# Patient Record
Sex: MALE | Race: WHITE | NOT HISPANIC OR LATINO | Employment: OTHER | URBAN - METROPOLITAN AREA
[De-identification: names, ages, dates, MRNs, and addresses within clinical notes are randomized per-mention and may not be internally consistent; named-entity substitution may affect disease eponyms.]

---

## 2017-04-28 ENCOUNTER — TRANSCRIBE ORDERS (OUTPATIENT)
Dept: LAB | Facility: HOSPITAL | Age: 82
End: 2017-04-28

## 2017-04-28 ENCOUNTER — LAB (OUTPATIENT)
Dept: LAB | Facility: HOSPITAL | Age: 82
End: 2017-04-28
Attending: INTERNAL MEDICINE

## 2017-04-28 DIAGNOSIS — E87.5 HYPERPOTASSEMIA: ICD-10-CM

## 2017-04-28 DIAGNOSIS — N18.30 CHRONIC KIDNEY DISEASE, STAGE III (MODERATE) (HCC): ICD-10-CM

## 2017-04-28 DIAGNOSIS — E87.5 HYPERPOTASSEMIA: Primary | ICD-10-CM

## 2017-04-28 LAB
ANION GAP SERPL CALCULATED.3IONS-SCNC: 12 MMOL/L
BACTERIA UR QL AUTO: NORMAL /HPF
BILIRUB UR QL STRIP: NEGATIVE
BUN BLD-MCNC: 23 MG/DL (ref 8–23)
BUN/CREAT SERPL: 14.7 (ref 7–25)
CALCIUM SPEC-SCNC: 9.5 MG/DL (ref 8.6–10.5)
CHLORIDE SERPL-SCNC: 102 MMOL/L (ref 98–107)
CLARITY UR: CLEAR
CO2 SERPL-SCNC: 25 MMOL/L (ref 22–29)
COLOR UR: YELLOW
CREAT BLD-MCNC: 1.56 MG/DL (ref 0.76–1.27)
GFR SERPL CREATININE-BSD FRML MDRD: 43 ML/MIN/1.73
GLUCOSE BLD-MCNC: 99 MG/DL (ref 65–99)
GLUCOSE UR STRIP-MCNC: NEGATIVE MG/DL
HGB UR QL STRIP.AUTO: NEGATIVE
HYALINE CASTS UR QL AUTO: NORMAL /LPF
KETONES UR QL STRIP: NEGATIVE
LEUKOCYTE ESTERASE UR QL STRIP.AUTO: NEGATIVE
NITRITE UR QL STRIP: NEGATIVE
PH UR STRIP.AUTO: 5.5 [PH] (ref 5–8)
POTASSIUM BLD-SCNC: 5.5 MMOL/L (ref 3.5–5.2)
PROT UR QL STRIP: ABNORMAL
RBC # UR: NORMAL /HPF
REF LAB TEST METHOD: NORMAL
SODIUM BLD-SCNC: 139 MMOL/L (ref 136–145)
SP GR UR STRIP: 1.02 (ref 1–1.03)
SQUAMOUS #/AREA URNS HPF: NORMAL /HPF
UROBILINOGEN UR QL STRIP: ABNORMAL
WBC UR QL AUTO: NORMAL /HPF

## 2017-04-28 PROCEDURE — 36415 COLL VENOUS BLD VENIPUNCTURE: CPT

## 2017-04-28 PROCEDURE — 81001 URINALYSIS AUTO W/SCOPE: CPT

## 2017-04-28 PROCEDURE — 80048 BASIC METABOLIC PNL TOTAL CA: CPT

## 2017-06-05 RX ORDER — PANTOPRAZOLE SODIUM 40 MG/1
TABLET, DELAYED RELEASE ORAL
Qty: 30 TABLET | Refills: 5 | Status: SHIPPED | OUTPATIENT
Start: 2017-06-05 | End: 2017-10-03 | Stop reason: SDUPTHER

## 2017-10-03 ENCOUNTER — APPOINTMENT (OUTPATIENT)
Dept: GENERAL RADIOLOGY | Facility: HOSPITAL | Age: 82
End: 2017-10-03

## 2017-10-03 ENCOUNTER — HOSPITAL ENCOUNTER (INPATIENT)
Facility: HOSPITAL | Age: 82
LOS: 3 days | Discharge: HOME | End: 2017-10-06
Attending: EMERGENCY MEDICINE | Admitting: INTERNAL MEDICINE

## 2017-10-03 DIAGNOSIS — J93.11 PRIMARY SPONTANEOUS PNEUMOTHORAX: Primary | ICD-10-CM

## 2017-10-03 PROBLEM — J44.9 COPD (CHRONIC OBSTRUCTIVE PULMONARY DISEASE): Status: ACTIVE | Noted: 2017-10-03

## 2017-10-03 LAB
ALBUMIN SERPL-MCNC: 4.9 G/DL (ref 3.5–5.2)
ALBUMIN/GLOB SERPL: 1.2 G/DL
ALP SERPL-CCNC: 133 U/L (ref 39–117)
ALT SERPL W P-5'-P-CCNC: 10 U/L (ref 1–41)
ANION GAP SERPL CALCULATED.3IONS-SCNC: 15.8 MMOL/L
ARTERIAL PATENCY WRIST A: POSITIVE
AST SERPL-CCNC: 20 U/L (ref 1–40)
ATMOSPHERIC PRESS: 760.2 MMHG
BASE EXCESS BLDA CALC-SCNC: -2 MMOL/L (ref 0–2)
BASOPHILS # BLD AUTO: 0.02 10*3/MM3 (ref 0–0.2)
BASOPHILS NFR BLD AUTO: 0.2 % (ref 0–1.5)
BDY SITE: ABNORMAL
BILIRUB SERPL-MCNC: 0.5 MG/DL (ref 0.1–1.2)
BUN BLD-MCNC: 20 MG/DL (ref 8–23)
BUN/CREAT SERPL: 15.9 (ref 7–25)
CALCIUM SPEC-SCNC: 10.2 MG/DL (ref 8.6–10.5)
CHLORIDE SERPL-SCNC: 97 MMOL/L (ref 98–107)
CO2 SERPL-SCNC: 25.2 MMOL/L (ref 22–29)
CREAT BLD-MCNC: 1.26 MG/DL (ref 0.76–1.27)
D-LACTATE SERPL-SCNC: 1 MMOL/L (ref 0.5–2)
DEPRECATED RDW RBC AUTO: 48.5 FL (ref 37–54)
EOSINOPHIL # BLD AUTO: 0.2 10*3/MM3 (ref 0–0.7)
EOSINOPHIL NFR BLD AUTO: 1.7 % (ref 0.3–6.2)
ERYTHROCYTE [DISTWIDTH] IN BLOOD BY AUTOMATED COUNT: 13.7 % (ref 11.5–14.5)
GAS FLOW AIRWAY: 0.5 LPM
GFR SERPL CREATININE-BSD FRML MDRD: 55 ML/MIN/1.73
GLOBULIN UR ELPH-MCNC: 4 GM/DL
GLUCOSE BLD-MCNC: 97 MG/DL (ref 65–99)
HCO3 BLDA-SCNC: 24.5 MMOL/L (ref 22–28)
HCT VFR BLD AUTO: 41.6 % (ref 40.4–52.2)
HGB BLD-MCNC: 13.7 G/DL (ref 13.7–17.6)
IMM GRANULOCYTES # BLD: 0.03 10*3/MM3 (ref 0–0.03)
IMM GRANULOCYTES NFR BLD: 0.2 % (ref 0–0.5)
LYMPHOCYTES # BLD AUTO: 1.7 10*3/MM3 (ref 0.9–4.8)
LYMPHOCYTES NFR BLD AUTO: 14.1 % (ref 19.6–45.3)
MCH RBC QN AUTO: 32.1 PG (ref 27–32.7)
MCHC RBC AUTO-ENTMCNC: 32.9 G/DL (ref 32.6–36.4)
MCV RBC AUTO: 97.4 FL (ref 79.8–96.2)
MODALITY: ABNORMAL
MONOCYTES # BLD AUTO: 1.46 10*3/MM3 (ref 0.2–1.2)
MONOCYTES NFR BLD AUTO: 12.1 % (ref 5–12)
NEUTROPHILS # BLD AUTO: 8.63 10*3/MM3 (ref 1.9–8.1)
NEUTROPHILS NFR BLD AUTO: 71.7 % (ref 42.7–76)
NT-PROBNP SERPL-MCNC: 588 PG/ML (ref 0–1800)
PCO2 BLDA: 47.3 MM HG (ref 35–45)
PH BLDA: 7.32 PH UNITS (ref 7.35–7.45)
PLATELET # BLD AUTO: 219 10*3/MM3 (ref 140–500)
PMV BLD AUTO: 12.1 FL (ref 6–12)
PO2 BLDA: 81.8 MM HG (ref 80–100)
POTASSIUM BLD-SCNC: 5 MMOL/L (ref 3.5–5.2)
PROT SERPL-MCNC: 8.9 G/DL (ref 6–8.5)
RBC # BLD AUTO: 4.27 10*6/MM3 (ref 4.6–6)
SAO2 % BLDCOA: 94.9 % (ref 92–99)
SET MECH RESP RATE: 22
SODIUM BLD-SCNC: 138 MMOL/L (ref 136–145)
TROPONIN T SERPL-MCNC: <0.01 NG/ML (ref 0–0.03)
WBC NRBC COR # BLD: 12.04 10*3/MM3 (ref 4.5–10.7)

## 2017-10-03 PROCEDURE — 25010000002 MIDAZOLAM PER 1 MG

## 2017-10-03 PROCEDURE — 87040 BLOOD CULTURE FOR BACTERIA: CPT | Performed by: NURSE PRACTITIONER

## 2017-10-03 PROCEDURE — 94640 AIRWAY INHALATION TREATMENT: CPT

## 2017-10-03 PROCEDURE — 85025 COMPLETE CBC W/AUTO DIFF WBC: CPT | Performed by: NURSE PRACTITIONER

## 2017-10-03 PROCEDURE — 71020 HC CHEST PA AND LATERAL: CPT

## 2017-10-03 PROCEDURE — 94799 UNLISTED PULMONARY SVC/PX: CPT

## 2017-10-03 PROCEDURE — 82803 BLOOD GASES ANY COMBINATION: CPT

## 2017-10-03 PROCEDURE — 25010000002 METHYLPREDNISOLONE PER 125 MG: Performed by: NURSE PRACTITIONER

## 2017-10-03 PROCEDURE — 84484 ASSAY OF TROPONIN QUANT: CPT | Performed by: NURSE PRACTITIONER

## 2017-10-03 PROCEDURE — 80053 COMPREHEN METABOLIC PANEL: CPT | Performed by: NURSE PRACTITIONER

## 2017-10-03 PROCEDURE — 0W9B30Z DRAINAGE OF LEFT PLEURAL CAVITY WITH DRAINAGE DEVICE, PERCUTANEOUS APPROACH: ICD-10-PCS | Performed by: EMERGENCY MEDICINE

## 2017-10-03 PROCEDURE — 93010 ELECTROCARDIOGRAM REPORT: CPT | Performed by: INTERNAL MEDICINE

## 2017-10-03 PROCEDURE — 93005 ELECTROCARDIOGRAM TRACING: CPT

## 2017-10-03 PROCEDURE — 83880 ASSAY OF NATRIURETIC PEPTIDE: CPT | Performed by: NURSE PRACTITIONER

## 2017-10-03 PROCEDURE — 71010 HC CHEST PA OR AP: CPT

## 2017-10-03 PROCEDURE — 99285 EMERGENCY DEPT VISIT HI MDM: CPT

## 2017-10-03 PROCEDURE — 36600 WITHDRAWAL OF ARTERIAL BLOOD: CPT

## 2017-10-03 PROCEDURE — 83605 ASSAY OF LACTIC ACID: CPT | Performed by: NURSE PRACTITIONER

## 2017-10-03 RX ORDER — MIDAZOLAM HYDROCHLORIDE 1 MG/ML
2 INJECTION INTRAMUSCULAR; INTRAVENOUS ONCE
Status: COMPLETED | OUTPATIENT
Start: 2017-10-03 | End: 2017-10-03

## 2017-10-03 RX ORDER — SODIUM CHLORIDE 9 MG/ML
75 INJECTION, SOLUTION INTRAVENOUS CONTINUOUS
Status: DISCONTINUED | OUTPATIENT
Start: 2017-10-03 | End: 2017-10-04

## 2017-10-03 RX ORDER — MIDAZOLAM HYDROCHLORIDE 1 MG/ML
INJECTION INTRAMUSCULAR; INTRAVENOUS
Status: COMPLETED
Start: 2017-10-03 | End: 2017-10-03

## 2017-10-03 RX ORDER — SODIUM CHLORIDE 0.9 % (FLUSH) 0.9 %
1-10 SYRINGE (ML) INJECTION AS NEEDED
Status: DISCONTINUED | OUTPATIENT
Start: 2017-10-03 | End: 2017-10-06 | Stop reason: HOSPADM

## 2017-10-03 RX ORDER — PANTOPRAZOLE SODIUM 40 MG/1
40 TABLET, DELAYED RELEASE ORAL DAILY
COMMUNITY
End: 2017-12-11 | Stop reason: SDUPTHER

## 2017-10-03 RX ORDER — ALBUTEROL SULFATE 2.5 MG/3ML
2.5 SOLUTION RESPIRATORY (INHALATION)
Status: DISCONTINUED | OUTPATIENT
Start: 2017-10-03 | End: 2017-10-06 | Stop reason: HOSPADM

## 2017-10-03 RX ORDER — METHYLPREDNISOLONE SODIUM SUCCINATE 125 MG/2ML
125 INJECTION, POWDER, LYOPHILIZED, FOR SOLUTION INTRAMUSCULAR; INTRAVENOUS ONCE
Status: COMPLETED | OUTPATIENT
Start: 2017-10-03 | End: 2017-10-03

## 2017-10-03 RX ORDER — IPRATROPIUM BROMIDE AND ALBUTEROL SULFATE 2.5; .5 MG/3ML; MG/3ML
3 SOLUTION RESPIRATORY (INHALATION) ONCE
Status: COMPLETED | OUTPATIENT
Start: 2017-10-03 | End: 2017-10-03

## 2017-10-03 RX ORDER — ALBUTEROL SULFATE 2.5 MG/3ML
2.5 SOLUTION RESPIRATORY (INHALATION)
Status: DISPENSED | OUTPATIENT
Start: 2017-10-03 | End: 2017-10-03

## 2017-10-03 RX ORDER — IPRATROPIUM BROMIDE AND ALBUTEROL SULFATE 2.5; .5 MG/3ML; MG/3ML
3 SOLUTION RESPIRATORY (INHALATION)
Status: DISCONTINUED | OUTPATIENT
Start: 2017-10-03 | End: 2017-10-04

## 2017-10-03 RX ADMIN — IPRATROPIUM BROMIDE AND ALBUTEROL SULFATE 3 ML: .5; 3 SOLUTION RESPIRATORY (INHALATION) at 17:03

## 2017-10-03 RX ADMIN — ALBUTEROL SULFATE 2.5 MG: 2.5 SOLUTION RESPIRATORY (INHALATION) at 17:04

## 2017-10-03 RX ADMIN — MIDAZOLAM HYDROCHLORIDE 2 MG: 1 INJECTION, SOLUTION INTRAMUSCULAR; INTRAVENOUS at 18:14

## 2017-10-03 RX ADMIN — IPRATROPIUM BROMIDE AND ALBUTEROL SULFATE 3 ML: .5; 3 SOLUTION RESPIRATORY (INHALATION) at 23:41

## 2017-10-03 RX ADMIN — SODIUM CHLORIDE 75 ML/HR: 9 INJECTION, SOLUTION INTRAVENOUS at 20:20

## 2017-10-03 RX ADMIN — METHYLPREDNISOLONE SODIUM SUCCINATE 125 MG: 125 INJECTION, POWDER, FOR SOLUTION INTRAMUSCULAR; INTRAVENOUS at 17:12

## 2017-10-03 RX ADMIN — MIDAZOLAM HYDROCHLORIDE 2 MG: 1 INJECTION INTRAMUSCULAR; INTRAVENOUS at 18:14

## 2017-10-03 NOTE — H&P
"    Patient Identification:  Name: Louis Villalba  Age/Sex: 84 y.o. male  :  10/20/1932  MRN: [id number]         Primary Care Physician: Adam Feliz MD    Chief Complaint   Patient presents with   • Shortness of Breath     started last night has gotten worse today.       Subjective   Patient is a 84 y.o. male with a h/o asbestosis who comes in for worsening shortness of breath since last night. States he was going up and down stairs several times last night for work around his house. States he became sob after this and he was unable to sleep all night because every time he tried to lay down the sob would become much worse. Since it did not get any better he came in to the ED and was found to have a left-sided PTX. He also had changes of severe COPD on CXR and states he saw a pulmonary doctor many years ago but is on no inhalers and hasn't been back to see him for some time. He states he was exposed to asbestos previously but was told it was \"all calcified\" in his lung now.     History of Present Illness    Past Medical History:   Diagnosis Date   • Anemia    • Arthritis    • Cancer     skin   • Colon polyp    • History of transfusion    • Hyperlipidemia    • Macular degeneration      Past Surgical History:   Procedure Laterality Date   • APPENDECTOMY     • COLONOSCOPY     • ENDOSCOPY N/A 10/5/2016    Procedure: ESOPHAGOGASTRODUODENOSCOPY with biopsy;  Surgeon: Edward Gan MD;  [facility]: [facility] ENDOSCOPY;  Service:    • SKIN BIOPSY       History reviewed. No pertinent family history.  Social History   Substance Use Topics   • Smoking status: Former Smoker     Quit date:    • Smokeless tobacco: None   • Alcohol use Yes      Comment: daily       (Not in a hospital admission)  Allergies:  Polymyxin b-trimethoprim and Sulfa antibiotics    Review of Systems   Constitutional: Negative.    HENT: Negative.    Eyes: Negative.    Respiratory: Positive for shortness of breath and wheezing.  "   Cardiovascular: Positive for chest pain. Negative for palpitations.        Pleuritic CP   Gastrointestinal: Negative.    Endocrine: Negative.    Genitourinary: Negative.    Musculoskeletal: Negative.    Skin: Negative.    Neurological: Negative.    Hematological: Negative.    Psychiatric/Behavioral: Negative.         Objective    Vital Signs  Temp:  [97.3 °F (36.3 °C)] 97.3 °F (36.3 °C)  Heart Rate:  [] 91  Resp:  [14-26] 14  BP: (125-224)/() 125/72  Body mass index is 18.56 kg/(m^2).    Physical Exam   Constitutional: He is oriented to person, place, and time.   chachectic and chronically-ill appearing   HENT:   Head: Normocephalic and atraumatic.   Mouth/Throat: No oropharyngeal exudate.   Eyes: Conjunctivae are normal. No scleral icterus.   Neck: Normal range of motion. No JVD present. No tracheal deviation present.   Cardiovascular: Normal rate and regular rhythm.    No murmur heard.  Pulmonary/Chest: Effort normal.   Decreased breath sounds   Abdominal: Soft. Bowel sounds are normal. He exhibits no distension. There is no tenderness.   Musculoskeletal: Normal range of motion. He exhibits no edema.   Neurological: He is alert and oriented to person, place, and time.   Skin: Skin is warm and dry.   Psychiatric: He has a normal mood and affect. His behavior is normal. Judgment and thought content normal.       Results Review:   I reviewed the patient's new clinical results.  Imaging Results (last 24 hours)     Procedure Component Value Units Date/Time    XR Chest 2 View [[id number]] Collected:  10/03/17 1832     Updated:  10/03/17 1855    Narrative:       TWO-VIEW CHEST     HISTORY: Shortness of breath.     There is severe COPD with hyperinflation. There is a fairly large left  lateral pneumothorax measuring up to 30% and this finding was called  immediately to the emergency room at the time of the dictation. There is  some minimal mediastinal shift to the right related to the pneumothorax.     The  lungs are clear except for numerous bilateral calcified granulomas,  unchanged from 10/02/2016. The heart size is normal.     This report was finalized on 10/3/2017 6:52 PM by Dr. Jose Lee MD.       XR Chest 1 View [[id number]] Collected:  10/03/17 1921     Updated:  10/03/17 1921    Narrative:       ONE VIEW PORTABLE CHEST AT 1903 HOURS     HISTORY: Chest tube placement for pneumothorax.     FINDINGS: A left-sided chest tube has been inserted for a previous large  left-sided pneumothorax and there is a very small residual left apical  lateral pneumothorax measuring up to 8 mm in thickness. The lungs are  hyperinflated and clear except for a few scattered calcified granulomas.  The heart size is normal.             Assessment/Plan     Active Problems:    Primary spontaneous pneumothorax    COPD (chronic obstructive pulmonary disease)      Assessment & Plan  1. Spontaneous PTX  - chest tube in place  - Dr Gutiérrez has been consulted and will manage    2. COPD  - he needs to see a Pulmonologist for this.   - no wheezing heard on exam so will hold off any steroids. He did receive dose of solumedrol in ED  - will give duonebs and prn albuterol  - Pulm consult    3. Leukocytosis  - think this is likely reactive from #1 and not pneumonia  - will not start abx right now    4. CKD3  - Cr likely at baseline  - gentle IVF overnight      I discussed the patients findings and my recommendations with patient and family.          Gibson Huizar MD  [facility]  10/03/17  7:33 PM

## 2017-10-03 NOTE — ED PROVIDER NOTES
"Pt presents to the ED c/o shortness of air. On exam, decreased BS in the left lung. Discussed the imaging results showing a pneumothorax and the plan for treatment and admission.     1813  Imaging confirmed.     1841  Chest tube in. Pt tolerated well.     1905  Imaging confirmed tube in correct position.     1939  BP- 125/72 HR- 91 Temp- 97.3 °F (36.3 °C) O2 sat- 99%  Rechecked the patient who is in NAD and is resting comfortably. Discussed imaging showing proper tube placement.         Chest Tube Insertion  Date/Time: 10/3/2017 6:15 PM  Performed by: TU KOWALSKI  Authorized by: TU KOWALSKI   Consent: Verbal consent obtained.  Risks and benefits: risks, benefits and alternatives were discussed  Consent given by: patient  Relevant documents: relevant documents present and verified  Test results: test results available and properly labeled  Site marked: the operative site was marked  Imaging studies: imaging studies available  Patient identity confirmed: arm band and verbally with patient  Time out: Immediately prior to procedure a \"time out\" was called to verify the correct patient, procedure, equipment, support staff and site/side marked as required.  Indications: pneumothorax    Sedation:  Patient sedated: 2mg IV Versed.  Anesthesia: local infiltration    Anesthesia:  Local Anesthetic: lidocaine 1% with epinephrine  Preparation: skin prepped with ChloraPrep  Placement location: left lateral  Scalpel size: 11  Tube size: 16 [place]  Ultrasound guidance: no  Tension pneumothorax heard: no  Tube connected to: suction  Suture material: 0 silk  Dressing: petrolatum-impregnated gauze and 4x4 sterile gauze  Post-insertion x-ray findings: tube in good position  Patient tolerance: Patient tolerated the procedure well with no immediate complications        EKG           EKG time: 1613  Rhythm/Rate: Sinus tachycardia 108  P waves and WV: nml  QRS, axis: nml, nml   ST and T waves: nml     Interpreted Contemporaneously " by me, independently viewed  No prior for comparison.         Attestation:  I supervised care provided by the midlevel provider.    We have discussed this patient's history, physical exam, and treatment plan.   I have reviewed the note and personally saw and examined the patient and agree with the plan of care.    Documentation assistance provided by arabella Brown for Dr. Fry Information recorded by the scribe was done at my direction and has been verified and validated by me.                Tena Brown  10/03/17 1952       Shamir Fry MD  10/03/17 7403

## 2017-10-03 NOTE — ED PROVIDER NOTES
EMERGENCY DEPARTMENT ENCOUNTER    CHIEF COMPLAINT  Chief Complaint: dyspnea  History given by: patient, family  History limited by: N/A  Room Number: 28/28  PMD: Adam Feliz MD      HPI:  Pt is a 84 y.o. male who presents with intermittent dyspnea that started last night and worsened today. It is exacerbated by exertion. He denies cough, sore throat, congestion, chest pain, BLE swelling, fevers, chills, abd pain, N/V/D, pain and difficulty with urination, hx of COPD/asthma, recent PO abx use, recent steroid use, and recent travel. He reports that he has hx of asbestosis and is not on any supplemental O2, inhalers, or breathing treatments. Pt has no other complaints at this time.     Duration: started last night  Timing: intermittent  Location: respiratory  Radiation: N/A  Quality: short of breath  Intensity/Severity: moderate  Progression: worsening  Associated Symptoms: none  Aggravating Factors: exertion  Alleviating Factors: none  Previous Episodes: none metnioned  Treatment before arrival: none mentioned     PAST MEDICAL HISTORY  Active Ambulatory Problems     Diagnosis Date Noted   • Gastric outlet obstruction 10/01/2016   • BON (acute kidney injury) 10/05/2016     Resolved Ambulatory Problems     Diagnosis Date Noted   • Peptic ulcer disease 10/06/2016     Past Medical History:   Diagnosis Date   • Anemia    • Arthritis    • Cancer    • Colon polyp    • History of transfusion    • Hyperlipidemia    • Macular degeneration        PAST SURGICAL HISTORY  Past Surgical History:   Procedure Laterality Date   • APPENDECTOMY  1958   • COLONOSCOPY  2013   • ENDOSCOPY N/A 10/5/2016    Procedure: ESOPHAGOGASTRODUODENOSCOPY with biopsy;  Surgeon: Edward Gan MD;  Location: [facility] ENDOSCOPY;  Service:    • SKIN BIOPSY         FAMILY HISTORY  History reviewed. No pertinent family history.    SOCIAL HISTORY  Social History     Social History   • Marital status:      Spouse name: N/A   • Number of  children: N/A   • Years of education: N/A     Occupational History   • Not on file.     Social History Main Topics   • Smoking status: Former Smoker     Quit date: 2010   • Smokeless tobacco: Not on file   • Alcohol use Yes      Comment: daily   • Drug use: No   • Sexual activity: Defer     Other Topics Concern   • Not on file     Social History Narrative         ALLERGIES  Polymyxin b-trimethoprim and Sulfa antibiotics    REVIEW OF SYSTEMS  Review of Systems   Constitutional: Negative.  Negative for activity change, appetite change ( decreased), chills and fever.   HENT: Negative for congestion, ear pain, rhinorrhea, sinus pressure and sore throat.    Eyes: Negative.    Respiratory: Positive for shortness of breath. Negative for cough.    Cardiovascular: Negative.  Negative for chest pain, palpitations and leg swelling ( pedal).   Gastrointestinal: Negative for abdominal pain, diarrhea, nausea and vomiting.   Endocrine: Negative.    Genitourinary: Negative.  Negative for decreased urine volume, difficulty urinating, dysuria, frequency and urgency.   Musculoskeletal: Negative.  Negative for back pain.   Skin: Negative.  Negative for rash.   Allergic/Immunologic: Negative.    Neurological: Negative.  Negative for dizziness, weakness, light-headedness, numbness and headaches.   Hematological: Negative.    Psychiatric/Behavioral: Negative.  The patient is not nervous/anxious.    All other systems reviewed and are negative.      PHYSICAL EXAM  ED Triage Vitals   Temp Heart Rate Resp BP SpO2   10/03/17 1550 10/03/17 1547 10/03/17 1547 10/03/17 1550 10/03/17 1547   97.3 °F (36.3 °C) 110 26 213/13 88 %      Temp src Heart Rate Source Patient Position BP Location FiO2 (%)   -- -- -- -- --              Physical Exam   Constitutional: He is oriented to person, place, and time. He appears distressed (mild).   HENT:   Head: Normocephalic.   Mouth/Throat: Oropharynx is clear and moist and mucous membranes are normal.   Eyes:  Pupils are equal, round, and reactive to light.   Neck: Normal range of motion.   Cardiovascular: Regular rhythm and normal heart sounds.  Tachycardia present.    Pulmonary/Chest: Tachypnea noted. He is in respiratory distress (mild). He has wheezes (bilaterally).   Abdominal: Soft. Bowel sounds are normal. There is no tenderness. There is no rebound and no guarding.   Musculoskeletal: Normal range of motion. He exhibits no edema (no pedal edema).   Neurological: He is alert and oriented to person, place, and time. He has normal motor skills and normal sensation.   Skin: Skin is warm and dry. No rash noted.   Psychiatric: Mood, memory, affect and judgment normal.   Nursing note and vitals reviewed.      LAB RESULTS  Recent Results (from the past 24 hour(s))   Comprehensive Metabolic Panel    Collection Time: 10/03/17  4:29 PM   Result Value Ref Range    Glucose 97 65 - 99 mg/dL    BUN 20 8 - 23 mg/dL    Creatinine 1.26 0.76 - 1.27 mg/dL    Sodium 138 136 - 145 mmol/L    Potassium 5.0 3.5 - 5.2 mmol/L    Chloride 97 (L) 98 - 107 mmol/L    CO2 25.2 22.0 - 29.0 mmol/L    Calcium 10.2 8.6 - 10.5 mg/dL    Total Protein 8.9 (H) 6.0 - 8.5 g/dL    Albumin 4.90 3.50 - 5.20 g/dL    ALT (SGPT) 10 1 - 41 U/L    AST (SGOT) 20 1 - 40 U/L    Alkaline Phosphatase 133 (H) 39 - 117 U/L    Total Bilirubin 0.5 0.1 - 1.2 mg/dL    eGFR Non African Amer 55 (L) >60 mL/min/1.73    Globulin 4.0 gm/dL    A/G Ratio 1.2 g/dL    BUN/Creatinine Ratio 15.9 7.0 - 25.0    Anion Gap 15.8 mmol/L   BNP    Collection Time: 10/03/17  4:29 PM   Result Value Ref Range    proBNP 588.0 0.0 - 1800.0 pg/mL   Troponin    Collection Time: 10/03/17  4:29 PM   Result Value Ref Range    Troponin T <0.010 0.000 - 0.030 ng/mL   Lactic Acid, Plasma    Collection Time: 10/03/17  4:29 PM   Result Value Ref Range    Lactate 1.0 0.5 - 2.0 mmol/L   CBC Auto Differential    Collection Time: 10/03/17  4:29 PM   Result Value Ref Range    WBC 12.04 (H) 4.50 - 10.70 10*3/mm3     RBC 4.27 (L) 4.60 - 6.00 10*6/mm3    Hemoglobin 13.7 13.7 - 17.6 g/dL    Hematocrit 41.6 40.4 - 52.2 %    MCV 97.4 (H) 79.8 - 96.2 fL    MCH 32.1 27.0 - 32.7 pg    MCHC 32.9 32.6 - 36.4 g/dL    RDW 13.7 11.5 - 14.5 %    RDW-SD 48.5 37.0 - 54.0 fl    MPV 12.1 (H) 6.0 - 12.0 fL    Platelets 219 140 - 500 10*3/mm3    Neutrophil % 71.7 42.7 - 76.0 %    Lymphocyte % 14.1 (L) 19.6 - 45.3 %    Monocyte % 12.1 (H) 5.0 - 12.0 %    Eosinophil % 1.7 0.3 - 6.2 %    Basophil % 0.2 0.0 - 1.5 %    Immature Grans % 0.2 0.0 - 0.5 %    Neutrophils, Absolute 8.63 (H) 1.90 - 8.10 10*3/mm3    Lymphocytes, Absolute 1.70 0.90 - 4.80 10*3/mm3    Monocytes, Absolute 1.46 (H) 0.20 - 1.20 10*3/mm3    Eosinophils, Absolute 0.20 0.00 - 0.70 10*3/mm3    Basophils, Absolute 0.02 0.00 - 0.20 10*3/mm3    Immature Grans, Absolute 0.03 0.00 - 0.03 10*3/mm3   Blood Gas, Arterial    Collection Time: 10/03/17  5:06 PM   Result Value Ref Range    Site Arterial: right radial     Grant's Test Positive     pH, Arterial 7.322 (L) 7.350 - 7.450 pH units    pCO2, Arterial 47.3 (H) 35.0 - 45.0 mm Hg    pO2, Arterial 81.8 80.0 - 100.0 mm Hg    HCO3, Arterial 24.5 22.0 - 28.0 mmol/L    Base Excess, Arterial -2.0 (L) 0.0 - 2.0 mmol/L    O2 Saturation Calculated 94.9 92.0 - 99.0 %    Barometric Pressure for Blood Gas 760.2 mmHg    Modality Cannula     Flow Rate 0.5 lpm    Set [facility] Resp Rate 22        I ordered the above labs and reviewed the results    RADIOLOGY         XR Chest 2 View (Preliminary result) (pre chest tube insertion)   Result time: 10/03/17 18:32:52     Preliminary result by Interface, Rad Results [place] In (10/03/17 18:32:52)     Narrative:     TWO-VIEW CHEST     HISTORY: Shortness of breath.     There is severe COPD with hyperinflation. There is a fairly large left  lateral pneumothorax measuring up to 30% and this finding was called  immediately to the emergency room at the time of the dictation. There is  some minimal mediastinal shift to  the right related to the pneumothorax.     The lungs are clear except for numerous bilateral calcified granulomas,  unchanged from 10/02/2016. The heart size is normal.                        XR Chest 1 View (Preliminary result) (post chest tube insertion) Result time: 10/03/17 19:21:47     Preliminary result by Interface, Rad Results [place] In (10/03/17 19:21:47)     Narrative:     ONE VIEW PORTABLE CHEST AT 1903 HOURS     HISTORY: Chest tube placement for pneumothorax.     FINDINGS: A left-sided chest tube has been inserted for a previous large  left-sided pneumothorax and there is a very small residual left apical  lateral pneumothorax measuring up to 8 mm in thickness. The lungs are  hyperinflated and clear except for a few scattered calcified granulomas.  The heart size is normal.          I ordered the above noted radiological studies and reviewed the images on the PACS system.  Spoke with Dr. Lee (radiologist) regarding 1st CXR scan results        EKG    EKG was interpreted by Dr. Fry. See Dr Fry's note for EKG interpretation.         PROGRESS AND CONSULTS  4:26 PM- During triage, pt's room air O2 sat was 88%. Pt was started on 2L O2 nasal cannula.     4:28 PM- Ordered duo neb, albuterol nebulizer, and solumedrol for dyspnea and wheezing. Ordered CXR, blood cultures, lactic acid, blood work, BNP, ABGs, troponin, and EKG for further evaluation.     5:04 PM- Reviewed pt's history and workup with Dr. Fry.  After a bedside evaluation; Dr Fry agrees with the plan of care.     5:06 PM- CXR shows 30% left pneumothorax. Sent call out to Dr Gutiérrez (cardiothoracic surgeon) for consult.     5:39 PM- Discussed case with Dr Gutiérrez (cardiothoracic surgeon)  Reviewed history, exam, results and treatments.  Discussed concerns and plan of care. Dr Gutiérrez requests that Dr Fry insert chest tube and that medicine admit. If medicine declines to admit, Dr Gutiérrez would like for us to call him back. He will  consult.     5:50 PM- Rechecked pt. He is resting more comfortably. Discussed with pt and family about all pertinent results including CXR findings (30% left pneumothorax). Informed them of plan for Dr Fry to insert chest tube for management of left pneumothorax. Discussed pt admission for further care, cardiothoracic consult, and observation. Pt and family verbalize understanding and agree with plan. Family now states that pt has hx of emphysema but is not on any breathing treatments or inhalers.     6:12 PM- Sent call out to [facility] for admission.     6:15 PM- Dr Fry performed chest tube insertion. See Dr Fry's note for further details.     6:52 PM- Discussed case with Dr Huizar ([facility] hospitalist)  Reviewed history, exam, results and treatments.  Discussed concerns and plan of care. Dr Huizar accepts pt to be admitted to telemetry.    7:23 PM- Updated Dr Gutiérrez. Informed Dr Gutiérrez that Dr Fry has inserted chest tube and that [facility] has admitted pt. Dr Gutiérrez states that he will see pt in the hospital.       COURSE & MEDICAL DECISION MAKING  Pertinent Labs and Imaging studies that were ordered and reviewed are noted above.  Results were reviewed/discussed with the patient and they were also made aware of online assess.   Pt also made aware that some labs, such as cultures, will not be resulted during ER visit and follow up with PMD is necessary.     MEDICATIONS GIVEN IN ER  Medications   albuterol (PROVENTIL) nebulizer solution 0.083% 2.5 mg/3mL (2.5 mg Nebulization Given 10/3/17 1704)   ipratropium-albuterol (DUO-NEB) nebulizer solution 3 mL (not administered)   albuterol (PROVENTIL) nebulizer solution 0.083% 2.5 mg/3mL (not administered)   sodium chloride 0.9 % infusion (not administered)   ipratropium-albuterol (DUO-NEB) nebulizer solution 3 mL (3 mL Nebulization Given 10/3/17 1703)   methylPREDNISolone sodium succinate (SOLU-Medrol) injection 125 mg (125 mg Intravenous Given 10/3/17 1712)   midazolam  "(VERSED) injection 2 mg (2 mg Intravenous Given 10/3/17 1814)       /72  Pulse 91  Temp 97.3 °F (36.3 °C)  Resp 14  Ht 66\" (167.6 cm)  Wt 115 lb (52.2 kg)  SpO2 99%  BMI 18.56 kg/m2      ADMISSION    Discussed treatment plan and reason for admission with pt/family and admitting physician.  Pt/family voiced understanding of the plan for admission for further testing/treatment as needed.      DIAGNOSIS  Final diagnoses:   Primary spontaneous pneumothorax         Documentation assistance provided by arabella Lynch for MARTIR Espino.  Information recorded by the arabella was done at my direction and has been verified and validated by me.       Sophia Lynch  10/03/17 1926       MARTIR Ramon  10/03/17 1954    "

## 2017-10-03 NOTE — ED TRIAGE NOTES
Patient reports shortness of breath since last night that has worsened today. He denies CP or any associated symptoms.

## 2017-10-04 ENCOUNTER — APPOINTMENT (OUTPATIENT)
Dept: GENERAL RADIOLOGY | Facility: HOSPITAL | Age: 82
End: 2017-10-04
Attending: THORACIC SURGERY (CARDIOTHORACIC VASCULAR SURGERY)

## 2017-10-04 LAB
ANION GAP SERPL CALCULATED.3IONS-SCNC: 17.4 MMOL/L
BASOPHILS # BLD AUTO: 0 10*3/MM3 (ref 0–0.2)
BASOPHILS NFR BLD AUTO: 0 % (ref 0–1.5)
BUN BLD-MCNC: 24 MG/DL (ref 8–23)
BUN/CREAT SERPL: 20 (ref 7–25)
CALCIUM SPEC-SCNC: 9.4 MG/DL (ref 8.6–10.5)
CHLORIDE SERPL-SCNC: 99 MMOL/L (ref 98–107)
CO2 SERPL-SCNC: 19.6 MMOL/L (ref 22–29)
CREAT BLD-MCNC: 1.2 MG/DL (ref 0.76–1.27)
DEPRECATED RDW RBC AUTO: 48.4 FL (ref 37–54)
EOSINOPHIL # BLD AUTO: 0.01 10*3/MM3 (ref 0–0.7)
EOSINOPHIL NFR BLD AUTO: 0.1 % (ref 0.3–6.2)
ERYTHROCYTE [DISTWIDTH] IN BLOOD BY AUTOMATED COUNT: 13.6 % (ref 11.5–14.5)
GFR SERPL CREATININE-BSD FRML MDRD: 58 ML/MIN/1.73
GLUCOSE BLD-MCNC: 112 MG/DL (ref 65–99)
HCT VFR BLD AUTO: 37.4 % (ref 40.4–52.2)
HGB BLD-MCNC: 12.1 G/DL (ref 13.7–17.6)
IMM GRANULOCYTES # BLD: 0.02 10*3/MM3 (ref 0–0.03)
IMM GRANULOCYTES NFR BLD: 0.3 % (ref 0–0.5)
LYMPHOCYTES # BLD AUTO: 1.17 10*3/MM3 (ref 0.9–4.8)
LYMPHOCYTES NFR BLD AUTO: 14.8 % (ref 19.6–45.3)
MCH RBC QN AUTO: 31.3 PG (ref 27–32.7)
MCHC RBC AUTO-ENTMCNC: 32.4 G/DL (ref 32.6–36.4)
MCV RBC AUTO: 96.9 FL (ref 79.8–96.2)
MONOCYTES # BLD AUTO: 0.26 10*3/MM3 (ref 0.2–1.2)
MONOCYTES NFR BLD AUTO: 3.3 % (ref 5–12)
NEUTROPHILS # BLD AUTO: 6.43 10*3/MM3 (ref 1.9–8.1)
NEUTROPHILS NFR BLD AUTO: 81.5 % (ref 42.7–76)
PLATELET # BLD AUTO: 191 10*3/MM3 (ref 140–500)
PMV BLD AUTO: 12.2 FL (ref 6–12)
POTASSIUM BLD-SCNC: 4.8 MMOL/L (ref 3.5–5.2)
RBC # BLD AUTO: 3.86 10*6/MM3 (ref 4.6–6)
SODIUM BLD-SCNC: 136 MMOL/L (ref 136–145)
WBC NRBC COR # BLD: 7.89 10*3/MM3 (ref 4.5–10.7)

## 2017-10-04 PROCEDURE — 85025 COMPLETE CBC W/AUTO DIFF WBC: CPT | Performed by: INTERNAL MEDICINE

## 2017-10-04 PROCEDURE — 71010 HC CHEST PA OR AP: CPT

## 2017-10-04 PROCEDURE — 94799 UNLISTED PULMONARY SVC/PX: CPT

## 2017-10-04 PROCEDURE — 99221 1ST HOSP IP/OBS SF/LOW 40: CPT | Performed by: NURSE PRACTITIONER

## 2017-10-04 PROCEDURE — 80048 BASIC METABOLIC PNL TOTAL CA: CPT | Performed by: INTERNAL MEDICINE

## 2017-10-04 RX ORDER — BRIMONIDINE TARTRATE AND TIMOLOL MALEATE 2; 5 MG/ML; MG/ML
1 SOLUTION OPHTHALMIC EVERY 12 HOURS
Status: DISCONTINUED | OUTPATIENT
Start: 2017-10-04 | End: 2017-10-06 | Stop reason: HOSPADM

## 2017-10-04 RX ORDER — ATORVASTATIN CALCIUM 10 MG/1
10 TABLET, FILM COATED ORAL DAILY
Status: DISCONTINUED | OUTPATIENT
Start: 2017-10-04 | End: 2017-10-06 | Stop reason: HOSPADM

## 2017-10-04 RX ORDER — IPRATROPIUM BROMIDE AND ALBUTEROL SULFATE 2.5; .5 MG/3ML; MG/3ML
3 SOLUTION RESPIRATORY (INHALATION) EVERY 4 HOURS PRN
Status: DISCONTINUED | OUTPATIENT
Start: 2017-10-04 | End: 2017-10-04

## 2017-10-04 RX ORDER — HYDROCODONE BITARTRATE AND ACETAMINOPHEN 5; 325 MG/1; MG/1
1 TABLET ORAL EVERY 6 HOURS PRN
Status: DISCONTINUED | OUTPATIENT
Start: 2017-10-04 | End: 2017-10-06 | Stop reason: HOSPADM

## 2017-10-04 RX ORDER — ACETAMINOPHEN 325 MG/1
650 TABLET ORAL EVERY 6 HOURS PRN
Status: DISCONTINUED | OUTPATIENT
Start: 2017-10-04 | End: 2017-10-06 | Stop reason: HOSPADM

## 2017-10-04 RX ORDER — PANTOPRAZOLE SODIUM 40 MG/1
40 TABLET, DELAYED RELEASE ORAL
Status: DISCONTINUED | OUTPATIENT
Start: 2017-10-04 | End: 2017-10-06 | Stop reason: HOSPADM

## 2017-10-04 RX ORDER — ARFORMOTEROL TARTRATE 15 UG/2ML
15 SOLUTION RESPIRATORY (INHALATION)
Status: DISCONTINUED | OUTPATIENT
Start: 2017-10-04 | End: 2017-10-06 | Stop reason: HOSPADM

## 2017-10-04 RX ORDER — ASPIRIN 81 MG/1
81 TABLET, CHEWABLE ORAL DAILY
Status: DISCONTINUED | OUTPATIENT
Start: 2017-10-04 | End: 2017-10-06 | Stop reason: HOSPADM

## 2017-10-04 RX ADMIN — ACETAMINOPHEN 650 MG: 325 TABLET ORAL at 19:08

## 2017-10-04 RX ADMIN — IPRATROPIUM BROMIDE AND ALBUTEROL SULFATE 3 ML: .5; 3 SOLUTION RESPIRATORY (INHALATION) at 03:38

## 2017-10-04 RX ADMIN — IPRATROPIUM BROMIDE AND ALBUTEROL SULFATE 3 ML: .5; 3 SOLUTION RESPIRATORY (INHALATION) at 12:32

## 2017-10-04 RX ADMIN — BRIMONIDINE TARTRATE, TIMOLOL MALEATE 1 DROP: 2; 5 SOLUTION/ DROPS OPHTHALMIC at 20:39

## 2017-10-04 RX ADMIN — ASPIRIN 81 MG: 81 TABLET, CHEWABLE ORAL at 08:52

## 2017-10-04 RX ADMIN — ATORVASTATIN CALCIUM 10 MG: 10 TABLET, FILM COATED ORAL at 08:52

## 2017-10-04 RX ADMIN — ARFORMOTEROL TARTRATE 15 MCG: 15 SOLUTION RESPIRATORY (INHALATION) at 19:40

## 2017-10-04 RX ADMIN — IPRATROPIUM BROMIDE AND ALBUTEROL SULFATE 3 ML: .5; 3 SOLUTION RESPIRATORY (INHALATION) at 06:44

## 2017-10-04 RX ADMIN — BRIMONIDINE TARTRATE, TIMOLOL MALEATE 1 DROP: 2; 5 SOLUTION/ DROPS OPHTHALMIC at 08:52

## 2017-10-04 NOTE — PROGRESS NOTES
"Adult Nutrition  Assessment/PES    Patient Name:  Louis Villalba  YOB: 1932  MRN: [id number]  Admit Date:  10/3/2017    Assessment Date:  10/4/2017    Assessment complete-low BMI; patient stated -115#, currently 115#; patient stated good appetite, will try supplemental shake with low potassium-ordered glucerna BID; Will follow           Reason for Assessment       10/04/17 1459    Reason for Assessment    Reason For Assessment/Visit identified at risk by screening criteria    Identified At Risk By Screening Criteria Low BMI-18.5    Diagnosis --   pneumothorax, severe COPD                Anthropometrics       10/04/17 1459    Anthropometrics (Special Considerations)    Height Used for Calculations 1.676 m (5' 6\")    Weight Used for Calculations 52.2 kg (115 lb)    RD Calculated IBW 63.8    RD Calculated % IBW 81    RD Calculated BMI (kg/m2) 18.5    Usual Body Weight (UBW)    Usual Body Weight 51.3 kg (113 lb)    Body Mass Index (BMI)    BMI Grade 17 - 19 low grade I            Labs/Tests/Procedures/Meds       10/04/17 1500    Labs/Tests/Procedures/Meds    Diagnostic Test/Procedure Review reviewed    Labs/Tests Review Reviewed;Glucose    Medication Review Reviewed, pertinent   PPI, NaCl    Significant Vitals reviewed            Physical Findings       10/04/17 1500    Physical Findings/Assessment    Additional Documentation Physical Appearance (Group)   B=20    Physical Appearance    Overall Physical Appearance underweight            Estimated/Assessed Needs       10/04/17 1500    Calculation Measurements    Weight Used For Calculations 52.2 kg (115 lb)    Height Used for Calculations 1.676 m (5' 6\")    Estimated/Assessed Energy Needs    Energy Need Method Kcal/kg    kcal/kg 35    35 Kcal/Kg (kcal) 1825.74    Estimated Kcal Range  6130-8535    Estimated/Assessed Protein Needs    Weight Used for Protein Calculation 52.2 kg (115 lb)    Protein (gm/kg) 1.0    1.0 Gm Protein (gm) 52.16    " Estimated/Assessed Fluid Needs    Fluid Need Method RDA method    RDA Method (mL)  1500            Nutrition Prescription Ordered       10/04/17 1501    Nutrition Prescription PO    Common Modifiers Cardiac            Evaluation of Received Nutrient/Fluid Intake       10/04/17 1501    PO Evaluation    Number of Meals 1    % PO Intake 75            Problem/Interventions:        Problem 1       10/04/17 1501    Nutrition Diagnoses Problem 1    Problem 1 Underweight    Etiology (related to) MNT for Treatment/Condition    Signs/Symptoms (evidenced by) BMI;% IBW    BMI 18 - 18.9    Percent (%) IBW 81 %                    Intervention Goal       10/04/17 1502    Intervention Goal    General Maintain nutrition    PO Tolerate PO;Increase intake    Weight Appropriate weight gain            Nutrition Intervention       10/04/17 1502    Nutrition Intervention    RD/Tech Action Interview for preference;Follow Tx progress;Care plan reviewd;Encourage intake;Supplement provided            Nutrition Prescription       10/04/17 1502    Nutrition Prescription PO    PO Prescription Begin/change supplement    Supplement Glucerna Shake    Supplement Frequency 2 times a day    New PO Prescription Ordered? Yes            Education/Evaluation       10/04/17 1502    Education    Education Will Instruct as appropriate    Monitor/Evaluation    Monitor Per protocol    Education Follow-up Reinforce PRN        Electronically signed by:  Maisha Alatorre RD  10/04/17 3:02 PM

## 2017-10-04 NOTE — PROGRESS NOTES
Continued Stay Note  [facility]     Patient Name: Louis Villalba  MRN: [id number]  Today's Date: 10/4/2017    Admit Date: 10/3/2017          Discharge Plan       10/04/17 1435    Case Management/Social Work Plan    Plan Home, no needs    Patient/Family In Agreement With Plan yes    Additional Comments Met with pt at bedside.  Introduced self, explained CCP role, facesheet verified.  Pt states he lives alone in patio home. IADL prior to hospitalizaton.  Gets medication from [facility] Dash.  Denies problems getting/affording medication.  Plans to return home, no needs.  CCP will follow for discharge needs. LEN Collazo RN              Discharge Codes     None            Maile Collazo

## 2017-10-04 NOTE — CONSULTS
Consults    Patient Care Team:  Adam Feliz MD as PCP - General (Internal Medicine)  No Known Provider as PCP - Family Medicine    Chief Complaint   Patient presents with   • Shortness of Breath     started last night has gotten worse today.       Subjective     History of Present Illness  Louis Villalba is a 84 year old male with prior history of COPD.  He was also a former smoker of 1 1/2 ppd for about 50 years.  He quit smoking approximately 10 years ago.  He presented to the ER yesterday due to sudden onset left sided pleuritic chest pain with shortness of breath.  He had a CXR completed which showed a spontaneous left pneumothorax.  He had a chest tube placed in the ER and he was admitted to [facility].  We were consulted for chest tube management.  At time of consult today, his only complaints were tenderness at chest tube site otherwise no new concerns.  No c/o shortness of breath, cough, hemoptysis, or left sided pleuritic chest pain.      Review of Systems   Constitutional: Negative.    HENT: Negative.    Respiratory: Positive for shortness of breath.    Cardiovascular: Positive for chest pain (left sided pleuritic chest pain).   Gastrointestinal: Negative.    Endocrine: Negative.    Genitourinary: Negative.    Musculoskeletal: Negative.    Skin: Negative.    Neurological: Negative.    Hematological: Negative.    Psychiatric/Behavioral: Negative.         Patient Active Problem List   Diagnosis   • Gastric outlet obstruction   • BON (acute kidney injury)   • Primary spontaneous pneumothorax   • COPD (chronic obstructive pulmonary disease)     Past Medical History:   Diagnosis Date   • Anemia    • Arthritis    • Cancer     skin   • Colon polyp    • History of transfusion    • Hyperlipidemia    • Macular degeneration      Past Surgical History:   Procedure Laterality Date   • APPENDECTOMY  1958   • COLONOSCOPY  2013   • ENDOSCOPY N/A 10/5/2016    Procedure: ESOPHAGOGASTRODUODENOSCOPY with biopsy;   Surgeon: Edward Gan MD;  Location: [facility] ENDOSCOPY;  Service:    • SKIN BIOPSY       History reviewed. No pertinent family history.  Social History     Social History   • Marital status:      Spouse name: N/A   • Number of children: N/A   • Years of education: N/A     Occupational History   • Not on file.     Social History Main Topics   • Smoking status: Former Smoker     Quit date: 2010   • Smokeless tobacco: Never Used   • Alcohol use Yes      Comment: daily   • Drug use: No   • Sexual activity: Defer     Other Topics Concern   • Not on file     Social History Narrative     Prescriptions Prior to Admission   Medication Sig Dispense Refill Last Dose   • aspirin 81 MG chewable tablet Chew 81 mg Daily.   Unknown at Unknown time   • atorvastatin (LIPITOR) 10 MG tablet Take 10 mg by mouth Daily.   Unknown at Unknown time   • brimonidine-timolol (COMBIGAN) 0.2-0.5 % ophthalmic solution Administer 1 drop to both eyes Every 12 (Twelve) Hours.   Unknown at Unknown time   • Multiple Vitamins-Minerals (VITEYES AREDS FORMULA PO) Take 2 tablets by mouth Daily.   Unknown at Unknown time   • pantoprazole (PROTONIX) 40 MG EC tablet Take 40 mg by mouth Daily.   Unknown at Unknown time     Allergies   Allergen Reactions   • Polymyxin B-Trimethoprim    • Sulfa Antibiotics Other (See Comments)     Cannot recall reaction       Objective      Vital Signs  Temp:  [97.3 °F (36.3 °C)-97.8 °F (36.6 °C)] 97.6 °F (36.4 °C)  Heart Rate:  [] 81  Resp:  [14-26] 16  BP: (125-224)/() 133/75    Intake & Output (last day)       10/03 0701 - 10/04 0700 10/04 0701 - 10/05 0700    P.O. 120 480    Total Intake(mL/kg) 120 (2.3) 480 (9.2)    Urine (mL/kg/hr) 500 200 (0.5)    Total Output 500 200    Net -380 +280                Physical Exam   Constitutional: He is oriented to person, place, and time. Vital signs are normal. He appears well-developed.   HENT:   Head: Normocephalic and atraumatic.   Neck: Normal range of  motion. Neck supple.   Cardiovascular: Normal rate, regular rhythm, normal heart sounds and intact distal pulses.    No murmur heard.  Pulmonary/Chest: Effort normal and breath sounds normal. He has no wheezes. He has no rhonchi. He has no rales. He exhibits no tenderness.   Chest tube 14 Tajik intact.  Removed dressing and left open to air due to no drainage from around tube.  Tube is secured with sutures.    Chest tube :   Site: Left, Clean, Dry and Intact  Suction: -20 cm  Air Leak: negative  Level: 0  24 Hour Total: 0     Abdominal: Soft. There is no tenderness.   Musculoskeletal: Normal range of motion. He exhibits no edema or tenderness.   Neurological: He is alert and oriented to person, place, and time. He has normal strength.   Skin: Skin is warm and dry. No rash noted. No cyanosis or erythema.   Psychiatric: He has a normal mood and affect. His behavior is normal.       Results Review:    I reviewed the patient's new clinical results.  I reviewed the patient's new imaging results and agree with the interpretation.    Imaging Results (last 24 hours)     Procedure Component Value Units Date/Time    XR Chest 2 View [[id number]] Collected:  10/03/17 1832     Updated:  10/03/17 1855    Narrative:       TWO-VIEW CHEST     HISTORY: Shortness of breath.     There is severe COPD with hyperinflation. There is a fairly large left  lateral pneumothorax measuring up to 30% and this finding was called  immediately to the emergency room at the time of the dictation. There is  some minimal mediastinal shift to the right related to the pneumothorax.     The lungs are clear except for numerous bilateral calcified granulomas,  unchanged from 10/02/2016. The heart size is normal.     This report was finalized on 10/3/2017 6:52 PM by Dr. Jose Lee MD.       XR Chest 1 View [[id number]] Collected:  10/03/17 1921     Updated:  10/03/17 2002    Narrative:       ONE VIEW PORTABLE CHEST AT 1903 HOURS     HISTORY: Chest tube  placement for pneumothorax.     FINDINGS: A left-sided chest tube has been inserted for a previous large  left-sided pneumothorax and there is a very small residual left apical  lateral pneumothorax measuring up to 8 mm in thickness. The lungs are  hyperinflated and clear except for a few scattered calcified granulomas.  The heart size is normal.     This report was finalized on 10/3/2017 7:59 PM by Dr. Jose Lee MD.       XR Chest 1 View [[id number]] Collected:  10/04/17 1432     Updated:  10/04/17 1432    Narrative:       PORTABLE CHEST X-RAY     HISTORY: Chest tube management.     Portable frontal chest x-ray is provided and correlated with chest x-ray  from yesterday evening.     FINDINGS: There is a 6 mm left apical pneumothorax. Left chest tube  remains in place. Numerous calcified granulomas are observed in the  lungs bilaterally. The cardiomediastinal silhouette is normal.       Impression:       Small left apical pneumothorax remains.             Lab Results:  Lab Results (last 24 hours)     Procedure Component Value Units Date/Time    CBC & Differential [[id number]] Collected:  10/03/17 1629    Specimen:  Blood Updated:  10/03/17 1641    Narrative:       The following orders were created for panel order CBC & Differential.  Procedure                               Abnormality         Status                     ---------                               -----------         ------                     CBC Auto Differential[[id number]]        Abnormal            Final result                 Please view results for these tests on the individual orders.    CBC Auto Differential [[id number]]  (Abnormal) Collected:  10/03/17 1629    Specimen:  Blood Updated:  10/03/17 1641     WBC 12.04 (H) 10*3/mm3      RBC 4.27 (L) 10*6/mm3      Hemoglobin 13.7 g/dL      Hematocrit 41.6 %      MCV 97.4 (H) fL      MCH 32.1 pg      MCHC 32.9 g/dL      RDW 13.7 %      RDW-SD 48.5 fl      MPV 12.1 (H) fL      Platelets 219 10*3/mm3       Neutrophil % 71.7 %      Lymphocyte % 14.1 (L) %      Monocyte % 12.1 (H) %      Eosinophil % 1.7 %      Basophil % 0.2 %      Immature Grans % 0.2 %      Neutrophils, Absolute 8.63 (H) 10*3/mm3      Lymphocytes, Absolute 1.70 10*3/mm3      Monocytes, Absolute 1.46 (H) 10*3/mm3      Eosinophils, Absolute 0.20 10*3/mm3      Basophils, Absolute 0.02 10*3/mm3      Immature Grans, Absolute 0.03 10*3/mm3     Lactic Acid, Plasma [[id number]]  (Normal) Collected:  10/03/17 1629    Specimen:  Blood Updated:  10/03/17 1649     Lactate 1.0 mmol/L     BNP [[id number]]  (Normal) Collected:  10/03/17 1629    Specimen:  Blood Updated:  10/03/17 1705     proBNP 588.0 pg/mL     Narrative:       Among patients with dyspnea, NT-proBNP is highly sensitive for the detection of acute congestive heart failure. In addition NT-proBNP of <300 pg/ml effectively rules out acute congestive heart failure with 99% negative predictive value.    Troponin [[id number]]  (Normal) Collected:  10/03/17 1629    Specimen:  Blood Updated:  10/03/17 1706     Troponin T <0.010 ng/mL     Narrative:       Troponin T Reference Ranges:  Less than 0.03 ng/mL:    Negative for AMI  0.03 to 0.09 ng/mL:      Indeterminant for AMI  Greater than 0.09 ng/mL: Positive for AMI    Comprehensive Metabolic Panel [[id number]]  (Abnormal) Collected:  10/03/17 1629    Specimen:  Blood Updated:  10/03/17 1706     Glucose 97 mg/dL      BUN 20 mg/dL      Creatinine 1.26 mg/dL      Sodium 138 mmol/L      Potassium 5.0 mmol/L      Chloride 97 (L) mmol/L      CO2 25.2 mmol/L      Calcium 10.2 mg/dL      Total Protein 8.9 (H) g/dL      Albumin 4.90 g/dL      ALT (SGPT) 10 U/L      AST (SGOT) 20 U/L      Alkaline Phosphatase 133 (H) U/L      Total Bilirubin 0.5 mg/dL      eGFR Non African Amer 55 (L) mL/min/1.73      Globulin 4.0 gm/dL      A/G Ratio 1.2 g/dL      BUN/Creatinine Ratio 15.9     Anion Gap 15.8 mmol/L     Narrative:       The MDRD GFR formula is only valid for adults  with stable renal function between ages 18 and 70.    Blood Gas, Arterial [[id number]]  (Abnormal) Collected:  10/03/17 1706    Specimen:  Arterial Blood Updated:  10/03/17 1710     Site Arterial: right radial     Grant's Test Positive     pH, Arterial 7.322 (L) pH units      pCO2, Arterial 47.3 (H) mm Hg      pO2, Arterial 81.8 mm Hg      HCO3, Arterial 24.5 mmol/L      Base Excess, Arterial -2.0 (L) mmol/L      O2 Saturation Calculated 94.9 %      Barometric Pressure for Blood Gas 760.2 mmHg      Modality Cannula     Flow Rate 0.5 lpm      Set [facility] Resp Rate 22    Narrative:       sat 96 Meter: [id number] : [id number] Jae Roland    CBC & Differential [[id number]] Collected:  10/04/17 0417    Specimen:  Blood Updated:  10/04/17 0454    Narrative:       The following orders were created for panel order CBC & Differential.  Procedure                               Abnormality         Status                     ---------                               -----------         ------                     CBC Auto Differential[[id number]]        Abnormal            Final result                 Please view results for these tests on the individual orders.    CBC Auto Differential [[id number]]  (Abnormal) Collected:  10/04/17 0417    Specimen:  Blood Updated:  10/04/17 0454     WBC 7.89 10*3/mm3      RBC 3.86 (L) 10*6/mm3      Hemoglobin 12.1 (L) g/dL      Hematocrit 37.4 (L) %      MCV 96.9 (H) fL      MCH 31.3 pg      MCHC 32.4 (L) g/dL      RDW 13.6 %      RDW-SD 48.4 fl      MPV 12.2 (H) fL      Platelets 191 10*3/mm3      Neutrophil % 81.5 (H) %      Lymphocyte % 14.8 (L) %      Monocyte % 3.3 (L) %      Eosinophil % 0.1 (L) %      Basophil % 0.0 %      Immature Grans % 0.3 %      Neutrophils, Absolute 6.43 10*3/mm3      Lymphocytes, Absolute 1.17 10*3/mm3      Monocytes, Absolute 0.26 10*3/mm3      Eosinophils, Absolute 0.01 10*3/mm3      Basophils, Absolute 0.00 10*3/mm3      Immature Grans, Absolute 0.02  10*3/mm3     Blood Culture - Blood, [[id number]]  (Normal) Collected:  10/03/17 1654    Specimen:  Blood from Arm, Left Updated:  10/04/17 0516     Blood Culture No growth at less than 24 hours    Blood Culture - Blood, [[id number]]  (Normal) Collected:  10/03/17 1656    Specimen:  Blood from Arm, Left Updated:  10/04/17 0516     Blood Culture No growth at less than 24 hours    Basic Metabolic Panel [[id number]]  (Abnormal) Collected:  10/04/17 0417    Specimen:  Blood Updated:  10/04/17 0524     Glucose 112 (H) mg/dL      BUN 24 (H) mg/dL      Creatinine 1.20 mg/dL      Sodium 136 mmol/L      Potassium 4.8 mmol/L      Chloride 99 mmol/L      CO2 19.6 (L) mmol/L      Calcium 9.4 mg/dL      eGFR Non African Amer 58 (L) mL/min/1.73      BUN/Creatinine Ratio 20.0     Anion Gap 17.4 mmol/L     Narrative:       The MDRD GFR formula is only valid for adults with stable renal function between ages 18 and 70.              Assessment/Plan     Active Problems:    Primary spontaneous pneumothorax    COPD (chronic obstructive pulmonary disease)      Assessment:    Condition: In stable condition.  Improving.   (Left spontaneous pneumothorax  COPD  ).     Plan:   (Repeat CXR after chest tube placement shows good re-expansion of lung with residual 8 mm pneumothorax remaining.  There is no air leak noted.  Will leave on -20 cm suction for today.  Repeat CXR in am.  Needs Flexi-track applied to tube to keep secured. May leave dressing off as long as no drainage from around chest tube.  Continue pain management and pulmonary hygiene.  Will provide further recommendations during hospital stay.  ).       I discussed the patients findings and our recommendations with patient and nursing staff.      Thank you for this consult and allowing us to participate in the care of your patient.  We will follow along with you during this hospitalization.       Tracey Quiroga, APRN  [facility]  10/04/17  3:16 PM

## 2017-10-04 NOTE — CONSULTS
[facility]  Phone: [phone]  Abdoul Acosta MD      Subjective   LOS: 0 days     Thank you for this consultation.  84-year-old male with sudden onset shortness of breath and discomfort in left chest wall.  Came to the emergency room.  Discovered to have a left pneumothorax.  Now has a chest tube to suction.  We were consulted for COPD.  Patient has been a 1-1/2 pack a day smoker for approximately 50 years.  He quit smoking 10 years ago.  He does not use any inhalers or oxygen at home.  He states at baseline he does not have any exercise limitation due to shortness of breath.  He denies wheezing or cough at baseline.    Medical history significant for macular degeneration.  He has some acid reflux disease.  He denies any heart disease, strokes, diabetes.  He has had multiple skin cancers removed.  He denies any family history of lung disease either.     I have reviewed and edited the Past Medical History, Past Surgical History, Home Medications, Social History and Family History as of 11:02 PM on 10/03/17.    Prescriptions Prior to Admission   Medication Sig Dispense Refill Last Dose   • aspirin 81 MG chewable tablet Chew 81 mg Daily.   Unknown at Unknown time   • atorvastatin (LIPITOR) 10 MG tablet Take 10 mg by mouth Daily.   Unknown at Unknown time   • brimonidine-timolol (COMBIGAN) 0.2-0.5 % ophthalmic solution Administer 1 drop to both eyes Every 12 (Twelve) Hours.   Unknown at Unknown time   • Multiple Vitamins-Minerals (VITEYES AREDS FORMULA PO) Take 2 tablets by mouth Daily.   Unknown at Unknown time   • pantoprazole (PROTONIX) 40 MG EC tablet Take 40 mg by mouth Daily.   Unknown at Unknown time     Allergies   Allergen Reactions   • Polymyxin B-Trimethoprim    • Sulfa Antibiotics Other (See Comments)     Cannot recall reaction       Review of Systems   Constitutional: Negative for chills and fever.   HENT: Negative for congestion, postnasal drip, sore throat and trouble swallowing.     Eyes: Positive for visual disturbance. Negative for redness.   Respiratory: Positive for shortness of breath. Negative for cough and wheezing.    Cardiovascular: Negative for chest pain, palpitations and leg swelling.   Gastrointestinal: Negative for abdominal pain, constipation, diarrhea, nausea and vomiting.   Endocrine: Negative for cold intolerance, heat intolerance and polydipsia.   Genitourinary: Negative for difficulty urinating, frequency, hematuria and urgency.   Musculoskeletal: Negative for arthralgias, back pain and joint swelling.   Skin: Negative for pallor and rash.   Neurological: Negative for seizures, syncope, weakness and headaches.   Psychiatric/Behavioral: Negative for sleep disturbance. The patient is not nervous/anxious.        Vital Signs past 24hrs  BP range: BP: (125-224)/() 149/79  Pulse range: Heart Rate:  [] 91  Resp rate range: Resp:  [14-26] 14  Temp range: Temp (24hrs), Av.5 °F (36.4 °C), Min:97.3 °F (36.3 °C), Max:97.7 °F (36.5 °C)    Oxygen range: SpO2:  [88 %-100 %] 99 %; Flow (L/min):  [0.5-4] 2;   O2 Device: nasal cannula  115 lb (52.2 kg); Body mass index is 18.56 kg/(m^2).       Adult male who looks about his stated age.  Oropharynx class III with dry oropharynx.  Poor dentition with missing teeth.  Nasopharynx without discharge and septum midline.  JVP is not distended.  Trachea midline thyroid not enlarged.  Lungs reveal bilateral air entry diminished but equal.  No wheezing or crackles.  Left chest tube noted.  Percussion note resonant chest expansion equal with no chest wall deformities or tenderness.  Heart examination S1-S2 present rhythm regular no murmurs.  No edema lower extremities.  Abdomen is soft, nontender.  Bowel sounds present no liver spleen enlargement.  No peripheral cyanosis clubbing.  Moves all 4 extremities sensory motor intact.  No cervical, axillary, inguinal adenopathy.    Results Review:    I have reviewed the laboratory and imaging  data from current admission. My annotations are as noted in assessment and plan.    Medication Review:  I have reviewed the current MAR. My annotations are as noted in assessment and plan.    Plan   PCCM Problems  Spontaneous left pneumothorax, now with chest tube  COPD severe, no exacerbation      Plan of Treatment  Manage chest tube for spontaneous left pneumothorax.  Hopefully placed on waterseal soon and thereafter remove after 12 days.    Severe COPD noted.  Requires PFTs an outpatient follow-up for same.  Requires evaluation for exertional hypoxia nocturnal hypoxia.  Requires inhalers for maintenance on discharge.  Patient advised outpatient follow-up.      Abdoul Acosta MD  10/03/17  11:02 PM    Part of this note may be an electronic transcription/translation of spoken language to printed text using the Dragon Dictation System.

## 2017-10-04 NOTE — PLAN OF CARE
Problem: Patient Care Overview (Adult)  Goal: Plan of Care Review  Outcome: Ongoing (interventions implemented as appropriate)    10/04/17 1551   Coping/Psychosocial Response Interventions   Plan Of Care Reviewed With patient   Patient Care Overview   Progress improving   Outcome Evaluation   Outcome Summary/Follow up Plan VSS, ct dressing removed today by roe mahoney, mauro, applied flex track, pt on -20 suction, no drainage present in atrium, no air leak, pt does not complain of pain, pt sts no soa, encouraged use of I/S, will continue to monitor         Problem: Chest Tube Drainage Device (Adult)  Goal: Signs and Symptoms of Listed Potential Problems Will be Absent or Manageable (Chest Tube Drainage Device)  Outcome: Ongoing (interventions implemented as appropriate)    10/04/17 1551   Chest Tube Drainage Device   Problems Assessed (Chest Tube Drainage Device) all         Problem: Respiratory Insufficiency (Adult)  Goal: Identify Related Risk Factors and Signs and Symptoms  Outcome: Ongoing (interventions implemented as appropriate)    10/04/17 1551   Respiratory Insufficiency   Related Risk Factors (Respiratory Insufficiency) surgery       Goal: Acid/Base Balance  Outcome: Ongoing (interventions implemented as appropriate)    10/04/17 1551   Respiratory Insufficiency (Adult)   Acid/Base Balance making progress toward outcome       Goal: Effective Ventilation  Outcome: Ongoing (interventions implemented as appropriate)    10/04/17 1551   Respiratory Insufficiency (Adult)   Effective Ventilation making progress toward outcome         Problem: Pain, Acute (Adult)  Goal: Identify Related Risk Factors and Signs and Symptoms  Outcome: Ongoing (interventions implemented as appropriate)    10/04/17 1551   Pain, Acute   Related Risk Factors (Acute Pain) patient perception;surgery;positioning       Goal: Acceptable Pain Control/Comfort Level  Outcome: Ongoing (interventions implemented as appropriate)    10/04/17 1551   Pain,  Acute (Adult)   Acceptable Pain Control/Comfort Level making progress toward outcome

## 2017-10-04 NOTE — PROGRESS NOTES
[facility]     LOS: 1 day     Subjective:      Objective:    Vital Signs:  Temp:  [97.3 °F (36.3 °C)-97.8 °F (36.6 °C)] 97.3 °F (36.3 °C)  Heart Rate:  [] 85  Resp:  [14-20] 16  BP: (125-173)/() 135/79    General Appearance: no acute distress, appears comfortable  Heart: regular rate and rhythm  Lungs: clear to auscultation bilaterally, respirations unlabored, good air entry  Abdomen: soft, non-tender, no guarding, no rebound, non-distended  Extremities: no edema, no cyanosis  Neurology: speech normal, CN grossly normal  Psychiatric: normal mood and affect    Results Review:    Glucose   Date Value Ref Range Status   10/04/2017 112 (H) 65 - 99 mg/dL Final   10/03/2017 97 65 - 99 mg/dL Final       Results from last 7 days  Lab Units 10/04/17  0417   WBC 10*3/mm3 7.89   HEMOGLOBIN g/dL 12.1*   HEMATOCRIT % 37.4*   PLATELETS 10*3/mm3 191       Results from last 7 days  Lab Units 10/04/17  0417 10/03/17  1629   SODIUM mmol/L 136 138   POTASSIUM mmol/L 4.8 5.0   CHLORIDE mmol/L 99 97*   CO2 mmol/L 19.6* 25.2   BUN mg/dL 24* 20   CREATININE mg/dL 1.20 1.26   CALCIUM mg/dL 9.4 10.2   BILIRUBIN mg/dL  --  0.5   ALK PHOS U/L  --  133*   ALT (SGPT) U/L  --  10   AST (SGOT) U/L  --  20   GLUCOSE mg/dL 112* 97                 Results from last 7 days  Lab Units 10/03/17  1629   TROPONIN T ng/mL <0.010     Blood Culture   Date Value Ref Range Status   10/03/2017 No growth at less than 24 hours  Preliminary                          I have reviewed daily medications and changes in CPOE    Scheduled meds    arformoterol 15 mcg Nebulization BID - RT   aspirin 81 mg Oral Daily   atorvastatin 10 mg Oral Daily   brimonidine-timolol 1 drop Both Eyes Q12H   pantoprazole 40 mg Oral Q AM          PRN meds  •  albuterol  •  influenza vaccine  •  sodium chloride      Active Problems:    Primary spontaneous pneumothorax    COPD (chronic obstructive pulmonary disease)        Assessment/Plan:  1.  Spontaneous PTX  - chest tube in place  - xray in the am  -CT surgery is following     2. COPD  - he needs to see a Pulmonologist for this.   - no wheezing heard on exam so will hold off any steroids. He did receive dose of solumedrol in ED  - will give duonebs and prn albuterol  - Pulm consult     3. Leukocytosis  - think this is likely reactive from #1 and not pneumonia  - will not start abx right now     4. CKD3  - Cr likely at baseline  - gentle IVF overnight          Marko Scott MD  10/04/17  5:10 PM

## 2017-10-04 NOTE — PLAN OF CARE
Problem: Patient Care Overview (Adult)  Goal: Plan of Care Review  Outcome: Ongoing (interventions implemented as appropriate)    10/04/17 0500   Coping/Psychosocial Response Interventions   Plan Of Care Reviewed With patient   Patient Care Overview   Progress progress toward functional goals as expected   Outcome Evaluation   Outcome Summary/Follow up Plan pt has not been short of breath or in pain since chest has been placed.          Problem: Chest Tube Drainage Device (Adult)  Intervention: Maximize Oxygenation/Ventilation/Perfusion    10/04/17 0500   Activity   Activity Type activity adjusted per tolerance   Promote Aggressive Pulmonary Hygiene/Secretion Management   Cough And Deep Breathing done independently per patient   Respiratory Interventions   Airway/Ventilation Management airway patency maintained   Positioning   Head Of Bed (HOB) Position HOB at 30-45 degrees         Goal: Signs and Symptoms of Listed Potential Problems Will be Absent or Manageable (Chest Tube Drainage Device)  Outcome: Ongoing (interventions implemented as appropriate)

## 2017-10-04 NOTE — NURSING NOTE
Spoke with MARTIR melvin, sts can change pt diet from NPO to home diet, also she advised pt needs flex track to hold CT in place, applied to pt, and asked if pt still needs to be on fluids, she sts will put in order to D/C fluids, can stop continuous iv fluids, read back and verified

## 2017-10-04 NOTE — PROGRESS NOTES
Hilton Chavarria MD                         [phone]      Patient ID:    Name:  Louis Villalba    MRN:  [id number]    10/20/1932   84 y.o.  male            CC/Reason for visit: Pneumothorax follow-up    Subjective: Patient says that he feels much better.  He does have some pain of the chest tube site.  His shortness of breath has improved.  LOS: 1    ROS: Review of Systems  No new fevers, worsening cough or shortness of breath.    Objective     Vital Signs past 24hrs  BP range: BP: (125-224)/() 135/79  Pulse range: Heart Rate:  [] 85  Resp rate range: Resp:  [14-26] 16  Temp range: Temp (24hrs), Av.6 °F (36.4 °C), Min:97.3 °F (36.3 °C), Max:97.8 °F (36.6 °C)    O2 Device: room air     115 lb (52.2 kg); Body mass index is 18.56 kg/(m^2).    Intake/Output Summary (Last 24 hours) at 10/04/17 1532  Last data filed at 10/04/17 1300   Gross per 24 hour   Intake              600 ml   Output              700 ml   Net             -100 ml       Exam:  GEN:  Cachectic white male in mild respiratory distress  EYES:   EOM-i, anicteric sclera bilat  ENT:    External ears/nose normal, OP clear  NECK:  Supple, midline trachea, No JVD or cervical LApathy  LUNGS: Bilateral breath sounds heard, decreased breath sounds bilaterally. no wheezes or crackles heard  CV:  Regular rate and rhythm, No murmur, left-sided small bore chest tube noted  ABD:  Non tender, no distended, no palpable liver or masses  EXT:  No cyanosis or clubbing, no peripheral/sacral edema    Scheduled meds:    aspirin 81 mg Oral Daily   atorvastatin 10 mg Oral Daily   brimonidine-timolol 1 drop Both Eyes Q12H   ipratropium-albuterol 3 mL Nebulization Q4H - RT   pantoprazole 40 mg Oral Q AM     IV meds:                           Data Review:        Results from last 7 days  Lab Units 10/04/17  0417 10/03/17  1629   SODIUM mmol/L 136 138   POTASSIUM mmol/L 4.8 5.0   CHLORIDE mmol/L 99 97*   CO2 mmol/L 19.6* 25.2    BUN mg/dL 24* 20   CREATININE mg/dL 1.20 1.26   CALCIUM mg/dL 9.4 10.2   BILIRUBIN mg/dL  --  0.5   ALK PHOS U/L  --  133*   ALT (SGPT) U/L  --  10   AST (SGOT) U/L  --  20   GLUCOSE mg/dL 112* 97   WBC 10*3/mm3 7.89 12.04*   HEMOGLOBIN g/dL 12.1* 13.7   PLATELETS 10*3/mm3 191 219   PROBNP pg/mL  --  588.0       Lab Results   Component Value Date    CALCIUM 9.4 10/04/2017         Results from last 7 days  Lab Units 10/03/17  1656 10/03/17  1654   BLOODCX  No growth at less than 24 hours No growth at less than 24 hours         Results from last 7 days  Lab Units 10/03/17  1629   TROPONIN T ng/mL <0.010       Results from last 7 days  Lab Units 10/03/17  1706   PH, ARTERIAL pH units 7.322*   PCO2, ARTERIAL mm Hg 47.3*   PO2 ART mm Hg 81.8   FLOW RATE lpm 0.5   MODALITY  Cannula   O2 SATURATION CALC % 94.9         Results from last 7 days  Lab Units 10/03/17  1629   TROPONIN T ng/mL <0.010       Estimated Creatinine Clearance: 33.8 mL/min (by C-G formula based on Cr of 1.2).    Results Review:    I have reviewed the laboratory and imaging data since the last note by [facility] physician.    ASSESSMENT:   Spontaneous left pneumothorax, now with chest tube ? Ruptured bleb  Undiagnosed COPD severe, no exacerbation  Severe emphysema  Stable calcified granulomas - lung    PLAN:  Will change nebulizers so that he does not have to use them every 4 hours.  Chest tube management per thoracic surgery  He will need outpatient pulmonary function test and follow-up.    Hilton Chavarria MD  10/4/2017

## 2017-10-05 ENCOUNTER — APPOINTMENT (OUTPATIENT)
Dept: GENERAL RADIOLOGY | Facility: HOSPITAL | Age: 82
End: 2017-10-05

## 2017-10-05 PROCEDURE — 99231 SBSQ HOSP IP/OBS SF/LOW 25: CPT | Performed by: NURSE PRACTITIONER

## 2017-10-05 PROCEDURE — 71010 HC CHEST PA OR AP: CPT

## 2017-10-05 PROCEDURE — 94799 UNLISTED PULMONARY SVC/PX: CPT

## 2017-10-05 RX ADMIN — ARFORMOTEROL TARTRATE 15 MCG: 15 SOLUTION RESPIRATORY (INHALATION) at 09:09

## 2017-10-05 RX ADMIN — ASPIRIN 81 MG: 81 TABLET, CHEWABLE ORAL at 08:06

## 2017-10-05 RX ADMIN — BRIMONIDINE TARTRATE, TIMOLOL MALEATE 1 DROP: 2; 5 SOLUTION/ DROPS OPHTHALMIC at 09:28

## 2017-10-05 RX ADMIN — ARFORMOTEROL TARTRATE 15 MCG: 15 SOLUTION RESPIRATORY (INHALATION) at 21:06

## 2017-10-05 RX ADMIN — ATORVASTATIN CALCIUM 10 MG: 10 TABLET, FILM COATED ORAL at 08:06

## 2017-10-05 RX ADMIN — PANTOPRAZOLE SODIUM 40 MG: 40 TABLET, DELAYED RELEASE ORAL at 06:04

## 2017-10-05 RX ADMIN — BRIMONIDINE TARTRATE, TIMOLOL MALEATE 1 DROP: 2; 5 SOLUTION/ DROPS OPHTHALMIC at 21:00

## 2017-10-05 NOTE — PROGRESS NOTES
[facility]     LOS: 2 days     Subjective:    No soa  Minimal chest pain  Eating ok  No n/v/d      Objective:    Vital Signs:  Temp:  [97.3 °F (36.3 °C)-97.6 °F (36.4 °C)] 97.4 °F (36.3 °C)  Heart Rate:  [] 86  Resp:  [16-20] 16  BP: (133-152)/(75-90) 150/88    General Appearance: no acute distress, appears comfortable  Heart: regular rate and rhythm  Lungs: clear to auscultation bilaterally, respirations unlabored, good air entry  Abdomen: soft, non-tender, no guarding, no rebound, non-distended  Extremities: no edema, no cyanosis  Neurology: speech normal, CN grossly normal  Psychiatric: normal mood and affect    Results Review:    Glucose   Date Value Ref Range Status   10/04/2017 112 (H) 65 - 99 mg/dL Final   10/03/2017 97 65 - 99 mg/dL Final       Results from last 7 days  Lab Units 10/04/17  0417   WBC 10*3/mm3 7.89   HEMOGLOBIN g/dL 12.1*   HEMATOCRIT % 37.4*   PLATELETS 10*3/mm3 191       Results from last 7 days  Lab Units 10/04/17  0417 10/03/17  1629   SODIUM mmol/L 136 138   POTASSIUM mmol/L 4.8 5.0   CHLORIDE mmol/L 99 97*   CO2 mmol/L 19.6* 25.2   BUN mg/dL 24* 20   CREATININE mg/dL 1.20 1.26   CALCIUM mg/dL 9.4 10.2   BILIRUBIN mg/dL  --  0.5   ALK PHOS U/L  --  133*   ALT (SGPT) U/L  --  10   AST (SGOT) U/L  --  20   GLUCOSE mg/dL 112* 97                 Results from last 7 days  Lab Units 10/03/17  1629   TROPONIN T ng/mL <0.010     Blood Culture   Date Value Ref Range Status   10/03/2017 No growth at less than 24 hours  Preliminary                          I have reviewed daily medications and changes in CPOE    Scheduled meds    arformoterol 15 mcg Nebulization BID - RT   aspirin 81 mg Oral Daily   atorvastatin 10 mg Oral Daily   brimonidine-timolol 1 drop Both Eyes Q12H   pantoprazole 40 mg Oral Q AM          PRN meds  •  acetaminophen  •  albuterol  •  HYDROcodone-acetaminophen  •  influenza vaccine  •  sodium chloride      Active Problems:    Primary  spontaneous pneumothorax    COPD (chronic obstructive pulmonary disease)        Assessment/Plan:  1. Spontaneous PTX  - chest tube in place and on suction  - xray personally reviewed  -CT surgery is following, d/w Tracey Head     2. COPD  - no wheezing heard on exam so will hold off any steroids. He did receive dose of solumedrol in ED  - will give duonebs and prn albuterol  - Pulm following     3. Leukocytosis  - think this is likely reactive from #1 and not pneumonia  - wbc is now 7.9 as of yesterday     4. CKD3  - Cr likely at baseline  - gentle IVF overnight        5. Htn - bp is better, maybe related to stress    Marko Scott MD  10/05/17  9:25 AM

## 2017-10-05 NOTE — PROGRESS NOTES
Hilton Chavarria MD                         [phone]      Patient ID:    Name:  Louis Villalba    MRN:  [id number]    10/20/1932   84 y.o.  male            CC/Reason for visit: Pneumothorax follow-up    Subjective: Patient says that he feels better.  He does have some pain of the chest tube site.  His shortness of breath has improved with the addition of new nebulizer.  LOS: 2    ROS: Review of Systems  No new fevers, worsening cough or shortness of breath.    Objective     Vital Signs past 24hrs  BP range: BP: (116-152)/(70-90) 116/70  Pulse range: Heart Rate:  [] 87  Resp rate range: Resp:  [16-20] 16  Temp range: Temp (24hrs), Av.4 °F (36.3 °C), Min:97.3 °F (36.3 °C), Max:97.5 °F (36.4 °C)    O2 Device: room air     115 lb (52.2 kg); Body mass index is 18.56 kg/(m^2).    Intake/Output Summary (Last 24 hours) at 10/05/17 1536  Last data filed at 10/05/17 1238   Gross per 24 hour   Intake              960 ml   Output              500 ml   Net              460 ml       Exam:  GEN:  Cachectic white male in mild respiratory distress  EYES:   EOM-i, anicteric sclera bilat  ENT:    External ears/nose normal, OP clear  NECK:  Supple, midline trachea, No JVD or cervical LApathy  LUNGS: Bilateral breath sounds heard, decreased breath sounds bilaterally. no wheezes or crackles heard  CV:  Regular rate and rhythm, No murmur, left-sided small bore chest tube noted  ABD:  Non tender, no distended, no palpable liver or masses  EXT:  No cyanosis or clubbing, no peripheral/sacral edema    Scheduled meds:      arformoterol 15 mcg Nebulization BID - RT   aspirin 81 mg Oral Daily   atorvastatin 10 mg Oral Daily   brimonidine-timolol 1 drop Both Eyes Q12H   pantoprazole 40 mg Oral Q AM     IV meds:                           Data Review:        Results from last 7 days  Lab Units 10/04/17  0417 10/03/17  1629   SODIUM mmol/L 136 138   POTASSIUM mmol/L 4.8 5.0   CHLORIDE mmol/L 99 97*    CO2 mmol/L 19.6* 25.2   BUN mg/dL 24* 20   CREATININE mg/dL 1.20 1.26   CALCIUM mg/dL 9.4 10.2   BILIRUBIN mg/dL  --  0.5   ALK PHOS U/L  --  133*   ALT (SGPT) U/L  --  10   AST (SGOT) U/L  --  20   GLUCOSE mg/dL 112* 97   WBC 10*3/mm3 7.89 12.04*   HEMOGLOBIN g/dL 12.1* 13.7   PLATELETS 10*3/mm3 191 219   PROBNP pg/mL  --  588.0       Lab Results   Component Value Date    CALCIUM 9.4 10/04/2017         Results from last 7 days  Lab Units 10/03/17  1656 10/03/17  1654   BLOODCX  No growth at 24 hours No growth at 24 hours         Results from last 7 days  Lab Units 10/03/17  1629   TROPONIN T ng/mL <0.010       Results from last 7 days  Lab Units 10/03/17  1706   PH, ARTERIAL pH units 7.322*   PCO2, ARTERIAL mm Hg 47.3*   PO2 ART mm Hg 81.8   FLOW RATE lpm 0.5   MODALITY  Cannula   O2 SATURATION CALC % 94.9         Results from last 7 days  Lab Units 10/03/17  1629   TROPONIN T ng/mL <0.010       Estimated Creatinine Clearance: 33.8 mL/min (by C-G formula based on Cr of 1.2).    Results Review:    I have reviewed the laboratory and imaging data since the last note by [facility] physician.    ASSESSMENT:   Spontaneous left pneumothorax, now with chest tube ? Ruptured bleb  Undiagnosed COPD severe, no exacerbation  Severe emphysema  Stable calcified granulomas - lung  ? BON vs CKD.     PLAN:  Still needing chest tube due to persistent pneumothorax. Chest tube management per thoracic surgery.  Patient otherwise clinically stable  We will watch renal function  He will need outpatient pulmonary function test and follow-up.    Hilton Chavarria MD  10/5/2017

## 2017-10-05 NOTE — PLAN OF CARE
Problem: Patient Care Overview (Adult)  Goal: Plan of Care Review  Outcome: Ongoing (interventions implemented as appropriate)    10/05/17 2526   Coping/Psychosocial Response Interventions   Plan Of Care Reviewed With patient   Patient Care Overview   Progress improving   Outcome Evaluation   Outcome Summary/Follow up Plan VSS, No c/o SOA, CT continues on -20sx, ambulated and sat in chair today.        Goal: Adult Individualization and Mutuality  Outcome: Ongoing (interventions implemented as appropriate)    Problem: Chest Tube Drainage Device (Adult)  Goal: Signs and Symptoms of Listed Potential Problems Will be Absent or Manageable (Chest Tube Drainage Device)  Outcome: Ongoing (interventions implemented as appropriate)    Problem: Respiratory Insufficiency (Adult)  Goal: Identify Related Risk Factors and Signs and Symptoms  Outcome: Outcome(s) achieved Date Met:  10/05/17  Goal: Acid/Base Balance  Outcome: Ongoing (interventions implemented as appropriate)  Goal: Effective Ventilation  Outcome: Ongoing (interventions implemented as appropriate)    Problem: Pain, Acute (Adult)  Goal: Identify Related Risk Factors and Signs and Symptoms  Outcome: Outcome(s) achieved Date Met:  10/05/17  Goal: Acceptable Pain Control/Comfort Level  Outcome: Ongoing (interventions implemented as appropriate)

## 2017-10-05 NOTE — PROGRESS NOTES
"    Chief Complaint: Left spontaneous pneumothorax  S/P: Left chest tube placement  POD # 2    Subjective:  Symptoms:  Stable.  No shortness of breath, cough or chest pain.  (Doing well.  Only c/o slight tenderness at chest tube site only with movement.  Has only needed Tylenol for pain control. No SOA..  ).    Diet:  Adequate intake.  No nausea or vomiting.    Activity level: Normal.    Pain:  He reports no pain.        Vital Signs:  Temp:  [97.3 °F (36.3 °C)-97.6 °F (36.4 °C)] 97.4 °F (36.3 °C)  Heart Rate:  [] 86  Resp:  [16-20] 16  BP: (133-152)/(75-90) 150/88    Intake & Output (last day)       10/04 0701 - 10/05 0700 10/05 0701 - 10/06 0700    P.O. 840 240    Total Intake(mL/kg) 840 (16.1) 240 (4.6)    Urine (mL/kg/hr) 450 (0.4)     Other 0 (0)     Total Output 450      Net +390 +240                Objective:  General Appearance:  Comfortable and in no acute distress.    Vital signs: (most recent): Blood pressure 150/88, pulse 86, temperature 97.4 °F (36.3 °C), temperature source Oral, resp. rate 16, height 66\" (167.6 cm), weight 115 lb (52.2 kg), SpO2 97 %.  Vital signs are normal.  No fever.    Lungs:  Normal respiratory rate and normal effort.    Heart: Normal rate.  Regular rhythm.    Extremities: There is no dependent edema.    Neurological: Patient is alert and oriented to person, place and time.    Skin:  Warm and dry.              Chest tube:   Site: Left, Clean and Dry - sutures do not appear to be intact.  Tube currently secured with flexi-trac dressing.    Suction: -20 cm  Air Leak: negative  Level: 0  24 Hour Total: 0    Results Review:     I reviewed the patient's new clinical results.  I reviewed the patient's new imaging results and agree with the interpretation.    Imaging Results (last 24 hours)     Procedure Component Value Units Date/Time    XR Chest 1 View [[id number]] Collected:  10/04/17 1432     Updated:  10/04/17 8348    Narrative:       PORTABLE CHEST X-RAY     HISTORY: Chest tube " management.     Portable frontal chest x-ray is provided and correlated with chest x-ray  from yesterday evening.     FINDINGS: There is a 6 mm left apical pneumothorax. Left chest tube  remains in place. Numerous calcified granulomas are observed in the  lungs bilaterally. The cardiomediastinal silhouette is normal.       Impression:       Small left apical pneumothorax remains.     This report was finalized on 10/4/2017 3:54 PM by Dr. Asael De La Cruz MD.       XR Chest 1 View [[id number]] Collected:  10/05/17 0824     Updated:  10/05/17 0824    Narrative:       PORTABLE AP SEMIERECT CHEST DATED 10/05/2017 AT 0527 HOURS     CLINICAL HISTORY: 84-year-old male with COPD, spontaneous left  pneumothorax, follow-up with left chest tube.     FINDINGS: The demonstration of lower chest on CT abdomen study of  10/01/2016 and multiple chest radiographs, most recent dated 10/04/2017  at 1249 hours, have been reviewed. Partially calcified pleural plaques  about the right and left chest are again demonstrated. Compatible with  most recent chest radiographs, the small caliber left chest tube is  again demonstrated. There is similar to minimally reduced apical to  superolateral left pneumothorax with pleural separation of 5 mm on the  current exam. The heart is normal in caliber. Aortic uncoiling and  calcification are again demonstrated. No new segmental or lobar collapse  or consolidation is seen in the lungs. Trace blunting of right  costophrenic angle is not excluded on the current exam. The left  costophrenic angle remains relatively sharp. Oxygen cannula tubing about  the neck and left chest, and monitoring lead wires, are present.     CONCLUSION: Minimal further reduction in magnitude of left apical to  superolateral pneumothorax, with current pleural separation of 5 mm.  Left chest tube is again demonstrated.             Lab Results:     Lab Results (last 24 hours)     Procedure Component Value Units Date/Time    Blood  Culture - Blood, [[id number]]  (Normal) Collected:  10/03/17 1654    Specimen:  Blood from Arm, Left Updated:  10/04/17 1716     Blood Culture No growth at 24 hours    Blood Culture - Blood, [[id number]]  (Normal) Collected:  10/03/17 1656    Specimen:  Blood from Arm, Left Updated:  10/04/17 1716     Blood Culture No growth at 24 hours           Assessment/Plan     Active Problems:    Primary spontaneous pneumothorax    COPD (chronic obstructive pulmonary disease)       Assessment:    Condition: In stable condition.  Improving.   (Left spontaneous pneumothorax  COPD).     Plan:   (CXR continues to improve.  5 mm left pneumothorax remains.  No air leak present.  Will leave on -20 cm suction for now until seen by Dr. Gutiérrez.  Possibly may be able to have tube removed by tomorrow and discharged home if condition continues to improve.  Repeat CXR in am.  D/W Dr. Scott.  ).       Tracey Quiroga, APRN  [facility]  10/05/17  10:22 AM

## 2017-10-05 NOTE — PLAN OF CARE
Problem: Patient Care Overview (Adult)  Goal: Plan of Care Review  Outcome: Ongoing (interventions implemented as appropriate)    10/05/17 0052   Coping/Psychosocial Response Interventions   Plan Of Care Reviewed With patient;family   Patient Care Overview   Progress improving   Outcome Evaluation   Outcome Summary/Follow up Plan pt is showing improvement in respiratiory function. Pt has been stable on room air while awake and tolerated chest tube well with minamal pain.          Problem: Chest Tube Drainage Device (Adult)  Goal: Signs and Symptoms of Listed Potential Problems Will be Absent or Manageable (Chest Tube Drainage Device)  Outcome: Ongoing (interventions implemented as appropriate)    10/05/17 0052   Chest Tube Drainage Device   Problems Assessed (Chest Tube Drainage Device) pain   Problems Present (Chest Tube Drainage Device) pain

## 2017-10-06 ENCOUNTER — APPOINTMENT (OUTPATIENT)
Dept: GENERAL RADIOLOGY | Facility: HOSPITAL | Age: 82
End: 2017-10-06
Attending: THORACIC SURGERY (CARDIOTHORACIC VASCULAR SURGERY)

## 2017-10-06 ENCOUNTER — APPOINTMENT (OUTPATIENT)
Dept: GENERAL RADIOLOGY | Facility: HOSPITAL | Age: 82
End: 2017-10-06

## 2017-10-06 VITALS
SYSTOLIC BLOOD PRESSURE: 101 MMHG | DIASTOLIC BLOOD PRESSURE: 69 MMHG | RESPIRATION RATE: 18 BRPM | HEIGHT: 66 IN | TEMPERATURE: 98 F | WEIGHT: 115 LBS | BODY MASS INDEX: 18.48 KG/M2 | OXYGEN SATURATION: 98 % | HEART RATE: 92 BPM

## 2017-10-06 DIAGNOSIS — J93.11 PRIMARY SPONTANEOUS PNEUMOTHORAX: Primary | ICD-10-CM

## 2017-10-06 LAB
ANION GAP SERPL CALCULATED.3IONS-SCNC: 9.8 MMOL/L
BUN BLD-MCNC: 22 MG/DL (ref 8–23)
BUN/CREAT SERPL: 18.5 (ref 7–25)
CALCIUM SPEC-SCNC: 9.3 MG/DL (ref 8.6–10.5)
CHLORIDE SERPL-SCNC: 101 MMOL/L (ref 98–107)
CO2 SERPL-SCNC: 28.2 MMOL/L (ref 22–29)
CREAT BLD-MCNC: 1.19 MG/DL (ref 0.76–1.27)
GFR SERPL CREATININE-BSD FRML MDRD: 58 ML/MIN/1.73
GLUCOSE BLD-MCNC: 95 MG/DL (ref 65–99)
POTASSIUM BLD-SCNC: 4.7 MMOL/L (ref 3.5–5.2)
SODIUM BLD-SCNC: 139 MMOL/L (ref 136–145)

## 2017-10-06 PROCEDURE — 94799 UNLISTED PULMONARY SVC/PX: CPT

## 2017-10-06 PROCEDURE — 71010 HC CHEST PA OR AP: CPT

## 2017-10-06 PROCEDURE — 99231 SBSQ HOSP IP/OBS SF/LOW 25: CPT | Performed by: NURSE PRACTITIONER

## 2017-10-06 PROCEDURE — 80048 BASIC METABOLIC PNL TOTAL CA: CPT | Performed by: INTERNAL MEDICINE

## 2017-10-06 RX ORDER — ACETAMINOPHEN 325 MG/1
650 TABLET ORAL EVERY 6 HOURS PRN
Start: 2017-10-06

## 2017-10-06 RX ADMIN — ATORVASTATIN CALCIUM 10 MG: 10 TABLET, FILM COATED ORAL at 09:12

## 2017-10-06 RX ADMIN — BRIMONIDINE TARTRATE, TIMOLOL MALEATE 1 DROP: 2; 5 SOLUTION/ DROPS OPHTHALMIC at 09:12

## 2017-10-06 RX ADMIN — ARFORMOTEROL TARTRATE 15 MCG: 15 SOLUTION RESPIRATORY (INHALATION) at 08:54

## 2017-10-06 RX ADMIN — ASPIRIN 81 MG: 81 TABLET, CHEWABLE ORAL at 09:12

## 2017-10-06 RX ADMIN — PANTOPRAZOLE SODIUM 40 MG: 40 TABLET, DELAYED RELEASE ORAL at 07:46

## 2017-10-06 NOTE — DISCHARGE INSTR - ACTIVITY
Dr. Alphonse Gutiérrez  Thoracic Surgical Specialist   [address] [facility] [address]  [place] [address]   P: [phone]  F: [phone]    Discharge Instructions:    1. Activity:  • Climb stairs as tolerated  • May drive car after 2 weeks or as directed by Physician  • Walk at least 3-4 times a day  • Limit lifting for first 6 weeks  • Resume sexual activity after 2 weeks    2. Nutrition:  • Resume previous diet  • Eat well balanced meals  • Avoid constipation by eating fresh fruits, vegetables, whole grain foods and increasing fluid intake.    3. Incision (wound) Care:  • Remove dressing after 48 hours  • If continued drainage, change dressing every 24 hours and as needed.  • May shower in 48 hrs.   • Wash around incision area with soap and water daily.  • No lotions or creams on incision area.  • Take temperature daily for the first week after discharge.    4. When to call for Medical Advice:  • Fever greater than 101.2 degrees  • Unusual or severe pain  • Difficulty breathing  • Incision changes (reddens, swelling or increased drainage)  • Any questions or problems    5. Medication Instructions:  • Take Pain medications as directed to stay comfortable  • No driving or drinking alcohol when taking prescribed pain meds  • Laxative of choice if needed for constipation.    6. Medication and dosages:     See discharge medication instruction form

## 2017-10-06 NOTE — NURSING NOTE
Spoke with Dr. BOLES Via telephone per req from Dr. Scott, req discharge orders and prescriptions for any home respiratory meds that need to be prescribed, Dr. DANIELS state he will put in orders that are needed, telephone read back and verified

## 2017-10-06 NOTE — PROGRESS NOTES
Hilton Chavarria MD                         [phone]      Patient ID:    Name:  Louis Villalba    MRN:  [id number]    10/20/1932   84 y.o.  male            CC/Reason for visit: Pneumothorax follow-up    Subjective: Patient says that he feels better. His shortness of breath has improved with the addition of new nebulizer. CT to be pulled today.   LOS: 3    ROS: Review of Systems  No new fevers, worsening cough or shortness of breath.    Objective     Vital Signs past 24hrs  BP range: BP: (101-146)/(69-83) 101/69  Pulse range: Heart Rate:  [] 92  Resp rate range: Resp:  [16-18] 18  Temp range: Temp (24hrs), Av.1 °F (36.7 °C), Min:97.5 °F (36.4 °C), Max:98.8 °F (37.1 °C)    O2 Device: room air     115 lb (52.2 kg); Body mass index is 18.56 kg/(m^2).    Intake/Output Summary (Last 24 hours) at 10/06/17 1626  Last data filed at 10/06/17 1254   Gross per 24 hour   Intake              600 ml   Output              525 ml   Net               75 ml       Exam:  GEN:  Cachectic white male in mild respiratory distress  EYES:   EOM-i, anicteric sclera bilat  ENT:    External ears/nose normal, OP clear  NECK:  Supple, midline trachea, No JVD or cervical LApathy  LUNGS: Bilateral breath sounds heard, decreased breath sounds bilaterally. no wheezes or crackles heard  CV:  Regular rate and rhythm, No murmur, left-sided small bore chest tube noted  ABD:  Non tender, no distended, no palpable liver or masses  EXT:  No cyanosis or clubbing, no peripheral/sacral edema    Scheduled meds:      arformoterol 15 mcg Nebulization BID - RT   aspirin 81 mg Oral Daily   atorvastatin 10 mg Oral Daily   brimonidine-timolol 1 drop Both Eyes Q12H   pantoprazole 40 mg Oral Q AM     IV meds:                           Data Review:        Results from last 7 days  Lab Units 10/06/17  0429 10/04/17  0417 10/03/17  1629   SODIUM mmol/L 139 136 138   POTASSIUM mmol/L 4.7 4.8 5.0   CHLORIDE mmol/L 101 99 97*    CO2 mmol/L 28.2 19.6* 25.2   BUN mg/dL 22 24* 20   CREATININE mg/dL 1.19 1.20 1.26   CALCIUM mg/dL 9.3 9.4 10.2   BILIRUBIN mg/dL  --   --  0.5   ALK PHOS U/L  --   --  133*   ALT (SGPT) U/L  --   --  10   AST (SGOT) U/L  --   --  20   GLUCOSE mg/dL 95 112* 97   WBC 10*3/mm3  --  7.89 12.04*   HEMOGLOBIN g/dL  --  12.1* 13.7   PLATELETS 10*3/mm3  --  191 219   PROBNP pg/mL  --   --  588.0       Lab Results   Component Value Date    CALCIUM 9.3 10/06/2017         Results from last 7 days  Lab Units 10/03/17  1656 10/03/17  1654   BLOODCX  No growth at 2 days No growth at 2 days         Results from last 7 days  Lab Units 10/03/17  1629   TROPONIN T ng/mL <0.010       Results from last 7 days  Lab Units 10/03/17  1706   PH, ARTERIAL pH units 7.322*   PCO2, ARTERIAL mm Hg 47.3*   PO2 ART mm Hg 81.8   FLOW RATE lpm 0.5   MODALITY  Cannula   O2 SATURATION CALC % 94.9         Results from last 7 days  Lab Units 10/03/17  1629   TROPONIN T ng/mL <0.010       Estimated Creatinine Clearance: 34.1 mL/min (by C-G formula based on Cr of 1.19).    Results Review:    I have reviewed the laboratory and imaging data since the last note by [facility] physician.    ASSESSMENT:   Spontaneous left pneumothorax, now with chest tube ? Ruptured bleb  Undiagnosed COPD severe, no exacerbation  Severe emphysema  Stable calcified granulomas - lung  ? BON vs CKD.     PLAN:  Plan per CTS to dc CT today noted.  Patient otherwise clinically stable.  Will dc on spiriva respimat.  He will need outpatient pulmonary function test and follow-up.    Hilton Chavarria MD  10/6/2017

## 2017-10-06 NOTE — PROGRESS NOTES
Continued Stay Note  [facility]     Patient Name: Louis Villalba  MRN: [id number]  Today's Date: 10/6/2017    Admit Date: 10/3/2017          Discharge Plan       10/06/17 1430    Case Management/Social Work Plan    Plan Home, no needs    Patient/Family In Agreement With Plan yes    Additional Comments Met with pt, daughter, and son in law at bedside.  Plans to return home, no needs.  Pt's children plan to alternate staying with him for the first few days after discharge.  Denies further needs. LEN Collazo RN              Discharge Codes     None        Expected Discharge Date and Time     Expected Discharge Date Expected Discharge Time    Oct 6, 2017             Maile Collazo

## 2017-10-06 NOTE — NURSING NOTE
"Spoke with Dr. Scott via telephone, sts he will put in discharge orders for pt, also he req that I call [facility] pharmacy [phone], sts they had called him and he req that I tell them to \"do NOT do the combavent, but DO FILL the spiriva, telephone read back and verified, call the pharmacy, talked to pharmacist, clarified order with him, telephone read back and verified  "

## 2017-10-06 NOTE — PROGRESS NOTES
"    Chief Complaint: Left spontaneous pneumothorax  S/P: Left chest tube placement  POD # 3    Subjective:  Symptoms:  Stable.  No shortness of breath, cough or chest pain.    Diet:  Adequate intake.  No nausea or vomiting.    Activity level: Normal.    Pain:  He reports no pain.        Vital Signs:  Temp:  [97.3 °F (36.3 °C)-98.8 °F (37.1 °C)] 97.5 °F (36.4 °C)  Heart Rate:  [84-98] 84  Resp:  [16-18] 18  BP: (116-146)/(70-83) 127/81    Intake & Output (last day)       10/05 0701 - 10/06 0700 10/06 0701 - 10/07 0700    P.O. 600 240    Total Intake(mL/kg) 600 (11.5) 240 (4.6)    Urine (mL/kg/hr) 625 (0.5)     Other 0 (0)     Total Output 625      Net -25 +240          Unmeasured Urine Occurrence 1 x           Objective:  General Appearance:  Comfortable and in no acute distress.    Vital signs: (most recent): Blood pressure 127/81, pulse 101, temperature 97.5 °F (36.4 °C), temperature source Oral, resp. rate 18, height 66\" (167.6 cm), weight 115 lb (52.2 kg), SpO2 93 %.  Vital signs are normal.  No fever.    Lungs:  Normal respiratory rate and normal effort.    Heart: Normal rate.  Regular rhythm.    Extremities: There is no dependent edema.    Neurological: Patient is alert and oriented to person, place and time.    Skin:  Warm and dry.              Chest tube:   Site: Left, Clean, Dry and Securement device intact  Suction: -20 cm  Air Leak: negative  Level: 0  24 Hour Total: 0    Results Review:     I reviewed the patient's new clinical results.  I reviewed the patient's new imaging results and agree with the interpretation.    Imaging Results (last 24 hours)     Procedure Component Value Units Date/Time    XR Chest 1 View [[id number]] Collected:  10/05/17 0824     Updated:  10/05/17 1233    Narrative:       PORTABLE AP SEMIERECT CHEST DATED 10/05/2017 AT 0527 HOURS     CLINICAL HISTORY: 84-year-old male with COPD, spontaneous left  pneumothorax, follow-up with left chest tube.     FINDINGS: The demonstration of " lower chest on CT abdomen study of  10/01/2016 and multiple chest radiographs, most recent dated 10/04/2017  at 1249 hours, have been reviewed. Partially calcified pleural plaques  about the right and left chest are again demonstrated. Compatible with  most recent chest radiographs, the small caliber left chest tube is  again demonstrated. There is similar to minimally reduced apical to  superolateral left pneumothorax with pleural separation of 5 mm on the  current exam. The heart is normal in caliber. Aortic uncoiling and  calcification are again demonstrated. No new segmental or lobar collapse  or consolidation is seen in the lungs. Trace blunting of right  costophrenic angle is not excluded on the current exam. The left  costophrenic angle remains relatively sharp. Oxygen cannula tubing about  the neck and left chest, and monitoring lead wires, are present.     CONCLUSION: Minimal further reduction in magnitude of left apical to  superolateral pneumothorax, with current pleural separation of 5 mm.  Left chest tube is again demonstrated.     This report was finalized on 10/5/2017 12:30 PM by Dr. Luis Huddleston MD.       XR Chest 1 View [[id number]] Collected:  10/06/17 0730     Updated:  10/06/17 0730    Narrative:       PORTABLE CHEST X-RAY     HISTORY: Chest tube management; J93.11-Primary spontaneous pneumothorax     COMPARISON: 10/05/2017.     FINDINGS: Portable AP view of the chest was obtained with overlying  monitor leads in place. The lungs are well inflated. There is probable  underlying emphysema and fibrosis. There are both pleural and  parenchymal calcifications bilaterally. There is no convincing evidence  of active air space disease process otherwise. Small left-sided  pneumothorax is again seen, most evident along the periphery of the left  upper lobe. It is similar to previous. No edema or pleural fluid. Left  thoracotomy tube remains. Heart size is normal. There are vascular  calculi.            Impression:       Stable appearance of the chest by portable radiography.                Lab Results:     Lab Results (last 24 hours)     Procedure Component Value Units Date/Time    Blood Culture - Blood, [[id number]]  (Normal) Collected:  10/03/17 1654    Specimen:  Blood from Arm, Left Updated:  10/05/17 1716     Blood Culture No growth at 2 days    Blood Culture - Blood, [[id number]]  (Normal) Collected:  10/03/17 1656    Specimen:  Blood from Arm, Left Updated:  10/05/17 1716     Blood Culture No growth at 2 days    Basic Metabolic Panel [[id number]]  (Abnormal) Collected:  10/06/17 0429    Specimen:  Blood Updated:  10/06/17 0613     Glucose 95 mg/dL      BUN 22 mg/dL      Creatinine 1.19 mg/dL      Sodium 139 mmol/L      Potassium 4.7 mmol/L      Chloride 101 mmol/L      CO2 28.2 mmol/L      Calcium 9.3 mg/dL      eGFR Non African Amer 58 (L) mL/min/1.73      BUN/Creatinine Ratio 18.5     Anion Gap 9.8 mmol/L     Narrative:       The MDRD GFR formula is only valid for adults with stable renal function between ages 18 and 70.           Assessment/Plan     Active Problems:    Primary spontaneous pneumothorax    COPD (chronic obstructive pulmonary disease)       Assessment:    Condition: In stable condition.  Improving.       Plan:   Encourage ambulation.  Start/continue incentive spirometry.  X-rays as ordered.  (Clamped chest tube this am.  Will repeat CXR at 1200.  Will remove chest tube if CXR remains stable. Continue current treatment plan.  Pulmonary hygiene, incentive spirometer, increase activity, and pain management.  ).       Tracey Quiroga, APRN  [facility]  10/06/17  8:39 AM

## 2017-10-06 NOTE — PROGRESS NOTES
[facility]     LOS: 3 days     Subjective:    No soa  Minimal chest pain  Eating ok  No n/v/d      Objective:    Vital Signs:  Temp:  [97.3 °F (36.3 °C)-98.8 °F (37.1 °C)] 98 °F (36.7 °C)  Heart Rate:  [] 92  Resp:  [16-18] 18  BP: (101-146)/(69-83) 101/69    General Appearance: no acute distress, appears comfortable  Heart: regular rate and rhythm  Lungs: clear to auscultation bilaterally, respirations unlabored, good air entry  Abdomen: soft, non-tender, no guarding, no rebound, non-distended  Extremities: no edema, no cyanosis  Neurology: speech normal, CN grossly normal  Psychiatric: normal mood and affect    Results Review:    Glucose   Date Value Ref Range Status   10/06/2017 95 65 - 99 mg/dL Final   10/04/2017 112 (H) 65 - 99 mg/dL Final   10/03/2017 97 65 - 99 mg/dL Final       Results from last 7 days  Lab Units 10/04/17  0417   WBC 10*3/mm3 7.89   HEMOGLOBIN g/dL 12.1*   HEMATOCRIT % 37.4*   PLATELETS 10*3/mm3 191       Results from last 7 days  Lab Units 10/06/17  0429  10/03/17  1629   SODIUM mmol/L 139  < > 138   POTASSIUM mmol/L 4.7  < > 5.0   CHLORIDE mmol/L 101  < > 97*   CO2 mmol/L 28.2  < > 25.2   BUN mg/dL 22  < > 20   CREATININE mg/dL 1.19  < > 1.26   CALCIUM mg/dL 9.3  < > 10.2   BILIRUBIN mg/dL  --   --  0.5   ALK PHOS U/L  --   --  133*   ALT (SGPT) U/L  --   --  10   AST (SGOT) U/L  --   --  20   GLUCOSE mg/dL 95  < > 97   < > = values in this interval not displayed.              Results from last 7 days  Lab Units 10/03/17  1629   TROPONIN T ng/mL <0.010     Blood Culture   Date Value Ref Range Status   10/03/2017 No growth at less than 24 hours  Preliminary           I have reviewed daily medications and changes in CPOE    Scheduled meds    arformoterol 15 mcg Nebulization BID - RT   aspirin 81 mg Oral Daily   atorvastatin 10 mg Oral Daily   brimonidine-timolol 1 drop Both Eyes Q12H   pantoprazole 40 mg Oral Q AM          PRN meds  •  acetaminophen  •   albuterol  •  HYDROcodone-acetaminophen  •  influenza vaccine  •  sodium chloride      Active Problems:    Primary spontaneous pneumothorax    COPD (chronic obstructive pulmonary disease)        Assessment/Plan:  1. Spontaneous PTX  - chest tube in place and on suction  - xray personally reviewed  -CT surgery is following, d/w Tracey Head     2. COPD  - no wheezing heard on exam so will hold off any steroids. He did receive dose of solumedrol in ED  - will give duonebs and prn albuterol  - Pulm following     3. Leukocytosis  - think this is likely reactive from #1 and not pneumonia  - wbc is now 7.9 as of yesterday     4. CKD3  - Cr likely at baseline  - gentle IVF overnight        5. Htn - bp is better, maybe related to stress    Possible d/c today or tomorrow      Marko Scott MD  10/06/17  12:51 PM

## 2017-10-06 NOTE — PLAN OF CARE
Problem: Patient Care Overview (Adult)  Goal: Plan of Care Review  Outcome: Ongoing (interventions implemented as appropriate)    Problem: Chest Tube Drainage Device (Adult)  Goal: Signs and Symptoms of Listed Potential Problems Will be Absent or Manageable (Chest Tube Drainage Device)  Outcome: Ongoing (interventions implemented as appropriate)    10/06/17 1753   Chest Tube Drainage Device   Problems Assessed (Chest Tube Drainage Device) all   Problems Present (Chest Tube Drainage Device) pain         Problem: Respiratory Insufficiency (Adult)  Goal: Acid/Base Balance  Outcome: Ongoing (interventions implemented as appropriate)    10/06/17 1753   Respiratory Insufficiency (Adult)   Acid/Base Balance making progress toward outcome       Goal: Effective Ventilation  Outcome: Ongoing (interventions implemented as appropriate)    10/06/17 1753   Respiratory Insufficiency (Adult)   Effective Ventilation making progress toward outcome         Problem: Pain, Acute (Adult)  Goal: Acceptable Pain Control/Comfort Level  Outcome: Ongoing (interventions implemented as appropriate)    10/06/17 1753   Pain, Acute (Adult)   Acceptable Pain Control/Comfort Level making progress toward outcome

## 2017-10-06 NOTE — PLAN OF CARE
Problem: Respiratory Insufficiency (Adult)  Intervention: Provide Oxygenation/Ventilation/Perfusion Support    10/06/17 0844   Activity   Activity Type activity adjusted per tolerance;bedrest with commode   Positioning   Head Of Bed (HOB) Position HOB at 30-45 degrees   Safety Interventions   Medication Review/Management medications reviewed         Goal: Effective Ventilation  Outcome: Ongoing (interventions implemented as appropriate)    10/06/17 0844   Respiratory Insufficiency (Adult)   Effective Ventilation making progress toward outcome

## 2017-10-06 NOTE — DISCHARGE SUMMARY
"[facility]    ASSOCIATES  [phone]    DISCHARGE SUMMARY  [facility]    Patient Identification:  Name: Louis Villalba  Age: 84 y.o.  Sex: male  :  10/20/1932  MRN: [id number]  Primary Care Physician: Adam Feliz MD    Admit date: 10/3/2017  Discharge date and time: No discharge date for patient encounter.     Discharge Diagnoses:Active Problems:    Primary spontaneous pneumothorax    COPD (chronic obstructive pulmonary disease)       History of present illness from H&P:    Patient is a 84 y.o. male with a h/o asbestosis who comes in for worsening shortness of breath since last night. States he was going up and down stairs several times last night for work around his house. States he became sob after this and he was unable to sleep all night because every time he tried to lay down the sob would become much worse. Since it did not get any better he came in to the ED and was found to have a left-sided PTX. He also had changes of severe COPD on CXR and states he saw a pulmonary doctor many years ago but is on no inhalers and hasn't been back to see him for some time. He states he was exposed to asbestos previously but was told it was \"all calcified\" in his lung now.     Hospital Course:     1. Spontaneous PTX likely related to copd  - chest tube in place and on suction  - xray personally reviewed  -CT surgery is ok with d/c today      2. COPD  - no wheezing heard on exam so will hold off any steroids. He did receive dose of solumedrol in ED  - discharged on spiriva      3. Leukocytosis  - think this is likely reactive from #1 and not pneumonia  - wbc good      4. CKD3  - Cr likely at baseline          5. Htn - bp is better, maybe related to stress as it was quite high on admission         Consults:     Consults     Date and Time Order Name Status Description    10/3/2017 1938 Inpatient Consult to Thoracic Surgery Completed     10/3/2017 1932 Inpatient Consult to " Pulmonology Completed     10/3/2017 1910 IP General Consult (Use specialty-specific consult if known) Completed     10/3/2017 1812 [facility] (on-call MD unless specified) Completed     10/3/2017 1706 Surgery (on-call MD unless specified) Completed             Results from last 7 days  Lab Units 10/04/17  0417   WBC 10*3/mm3 7.89   HEMOGLOBIN g/dL 12.1*   HEMATOCRIT % 37.4*   PLATELETS 10*3/mm3 191         Results from last 7 days  Lab Units 10/06/17  0429   SODIUM mmol/L 139   POTASSIUM mmol/L 4.7   CHLORIDE mmol/L 101   CO2 mmol/L 28.2   BUN mg/dL 22   CREATININE mg/dL 1.19   GLUCOSE mg/dL 95   CALCIUM mg/dL 9.3       Significant Diagnostic Studies:   Lab Results   Component Value Date    WBC 7.89 10/04/2017    HGB 12.1 (L) 10/04/2017    HCT 37.4 (L) 10/04/2017     10/04/2017     Lab Results   Component Value Date     10/06/2017    K 4.7 10/06/2017     10/06/2017    CO2 28.2 10/06/2017    BUN 22 10/06/2017    CREATININE 1.19 10/06/2017    GLUCOSE 95 10/06/2017     Lab Results   Component Value Date    CALCIUM 9.3 10/06/2017     No results found for: AST, ALT, ALKPHOS  No results found for: APTT, INR  No results found for: COLORU, CLARITYU, SPECGRAV, PHUR, PROTEINUR, GLUCOSEU, KETONESU, BLOODU, NITRITE, LEUKOCYTESUR, BILIRUBINUR, UROBILINOGEN, RBCUA, WBCUA, BACTERIA  No results found for: TROPONINT, TROPONINI, BNP  No components found for: HGBA1C;2  No components found for: TSH;2    Imaging Results (all)     Procedure Component Value Units Date/Time    XR Chest 2 View [[id number]] Collected:  10/03/17 1832     Updated:  10/03/17 1855    Narrative:       TWO-VIEW CHEST     HISTORY: Shortness of breath.     There is severe COPD with hyperinflation. There is a fairly large left  lateral pneumothorax measuring up to 30% and this finding was called  immediately to the emergency room at the time of the dictation. There is  some minimal mediastinal shift to the right related to the pneumothorax.     The lungs  are clear except for numerous bilateral calcified granulomas,  unchanged from 10/02/2016. The heart size is normal.     This report was finalized on 10/3/2017 6:52 PM by Dr. Jose Lee MD.       XR Chest 1 View [[id number]] Collected:  10/03/17 1921     Updated:  10/03/17 2002    Narrative:       ONE VIEW PORTABLE CHEST AT 1903 HOURS     HISTORY: Chest tube placement for pneumothorax.     FINDINGS: A left-sided chest tube has been inserted for a previous large  left-sided pneumothorax and there is a very small residual left apical  lateral pneumothorax measuring up to 8 mm in thickness. The lungs are  hyperinflated and clear except for a few scattered calcified granulomas.  The heart size is normal.     This report was finalized on 10/3/2017 7:59 PM by Dr. Jose Lee MD.       XR Chest 1 View [[id number]] Collected:  10/04/17 1432     Updated:  10/04/17 1557    Narrative:       PORTABLE CHEST X-RAY     HISTORY: Chest tube management.     Portable frontal chest x-ray is provided and correlated with chest x-ray  from yesterday evening.     FINDINGS: There is a 6 mm left apical pneumothorax. Left chest tube  remains in place. Numerous calcified granulomas are observed in the  lungs bilaterally. The cardiomediastinal silhouette is normal.       Impression:       Small left apical pneumothorax remains.     This report was finalized on 10/4/2017 3:54 PM by Dr. Asael De La Cruz MD.       XR Chest 1 View [[id number]] Collected:  10/05/17 0824     Updated:  10/05/17 1233    Narrative:       PORTABLE AP SEMIERECT CHEST DATED 10/05/2017 AT 0527 HOURS     CLINICAL HISTORY: 84-year-old male with COPD, spontaneous left  pneumothorax, follow-up with left chest tube.     FINDINGS: The demonstration of lower chest on CT abdomen study of  10/01/2016 and multiple chest radiographs, most recent dated 10/04/2017  at 1249 hours, have been reviewed. Partially calcified pleural plaques  about the right and left chest are again  demonstrated. Compatible with  most recent chest radiographs, the small caliber left chest tube is  again demonstrated. There is similar to minimally reduced apical to  superolateral left pneumothorax with pleural separation of 5 mm on the  current exam. The heart is normal in caliber. Aortic uncoiling and  calcification are again demonstrated. No new segmental or lobar collapse  or consolidation is seen in the lungs. Trace blunting of right  costophrenic angle is not excluded on the current exam. The left  costophrenic angle remains relatively sharp. Oxygen cannula tubing about  the neck and left chest, and monitoring lead wires, are present.     CONCLUSION: Minimal further reduction in magnitude of left apical to  superolateral pneumothorax, with current pleural separation of 5 mm.  Left chest tube is again demonstrated.     This report was finalized on 10/5/2017 12:30 PM by Dr. Luis Huddleston MD.       XR Chest 1 View [[id number]] Collected:  10/06/17 0730     Updated:  10/06/17 0730    Narrative:       PORTABLE CHEST X-RAY     HISTORY: Chest tube management; J93.11-Primary spontaneous pneumothorax     COMPARISON: 10/05/2017.     FINDINGS: Portable AP view of the chest was obtained with overlying  monitor leads in place. The lungs are well inflated. There is probable  underlying emphysema and fibrosis. There are both pleural and  parenchymal calcifications bilaterally. There is no convincing evidence  of active air space disease process otherwise. Small left-sided  pneumothorax is again seen, most evident along the periphery of the left  upper lobe. It is similar to previous. No edema or pleural fluid. Left  thoracotomy tube remains. Heart size is normal. There are vascular  calculi.           Impression:       Stable appearance of the chest by portable radiography.          XR Chest 1 View [[id number]] Collected:  10/06/17 1451     Updated:  10/06/17 1456    Narrative:       AP CHEST     HISTORY: Chest tube  management. Followup exam.     COMPARISON: AP chest 10/06/2017, 10/05/2017.     FINDINGS: A left chest tube is present with tip overlying medial left  lower lobe. There is a persistent small left apical pneumothorax  measuring approximately 5 mm at the left apex. There are bilateral  calcified pleural plaques and potential calcified granulomas without  change. There is no evidence for pulmonary edema or pleural effusion.       Impression:       No significant change compared to examination same date at  5:10 AM. There is a small left apical pneumothorax.     This report was finalized on 10/6/2017 2:53 PM by Dr. Melchor Dejesus MD.             TEST  RESULTS PENDING AT DISCHARGE   Order Current Status    Blood Culture - Blood, Preliminary result    Blood Culture - Blood, Preliminary result          Patient Instructions:    Louis Villalba   Home Medication Instructions LALITO:[id number]    Printed on:10/06/17 0344   Medication Information                      acetaminophen (TYLENOL) 325 MG tablet  Take 2 tablets by mouth Every 6 (Six) Hours As Needed for Mild Pain .             aspirin 81 MG chewable tablet  Chew 81 mg Daily.             atorvastatin (LIPITOR) 10 MG tablet  Take 10 mg by mouth Daily.             brimonidine-timolol (COMBIGAN) 0.2-0.5 % ophthalmic solution  Administer 1 drop to both eyes Every 12 (Twelve) Hours.             Multiple Vitamins-Minerals (VITEYES AREDS FORMULA PO)  Take 2 tablets by mouth Daily.             pantoprazole (PROTONIX) 40 MG EC tablet  Take 40 mg by mouth Daily.             Tiotropium Bromide Monohydrate (SPIRIVA RESPIMAT) 2.5 MCG/ACT aerosol solution  Inhale 1 inhaler Daily.               Future Appointments  Date Time Provider Department Center   10/23/2017 2:45 PM Alphonse Gutiérrez MD [facility] None     Follow-up Information     Follow up with Alphonse Gutiérrez MD. Go on 10/23/2017.    Specialty:  Thoracic Surgery    Why:  CXR first at [facility] at 1:45 pm then to  office for appt.      Contact information:    [address] [facility] [address]  [place] [id number]  [phone]          Follow up with Hilton Chavarria MD. Schedule an appointment as soon as possible for a visit in 2 week(s).    Specialty:  Pulmonary Disease    Contact information:    [address] [facility] [address]  [facility] [address]  [phone]          Discharge Order     Start     Ordered    10/06/17 1342  Discharge patient  Once     Expected Discharge Date:  10/06/17    Discharge Disposition:  Home or Self Care        10/06/17 1342          F/u with CTS and pulmonary as above.        Total time spent discharging patient including evaluation, post hospitalization follow up,  medication and post hospitalization instructions and education, total time exceeds 30 minutes.    Signed:  Marko Scott MD  10/6/2017  4:47 PM

## 2017-10-08 LAB
BACTERIA SPEC AEROBE CULT: NORMAL
BACTERIA SPEC AEROBE CULT: NORMAL

## 2017-10-09 NOTE — PROGRESS NOTES
Case Management Discharge Note    Final Note: Pt discharged home    Discharge Placement     No information found             Discharge Codes: 01  Discharge to home

## 2017-10-23 ENCOUNTER — HOSPITAL ENCOUNTER (OUTPATIENT)
Dept: GENERAL RADIOLOGY | Facility: HOSPITAL | Age: 82
Discharge: HOME | End: 2017-10-23
Attending: THORACIC SURGERY (CARDIOTHORACIC VASCULAR SURGERY) | Admitting: THORACIC SURGERY (CARDIOTHORACIC VASCULAR SURGERY)

## 2017-10-23 ENCOUNTER — OFFICE VISIT (OUTPATIENT)
Dept: SURGERY | Facility: CLINIC | Age: 82
End: 2017-10-23

## 2017-10-23 VITALS
HEIGHT: 66 IN | DIASTOLIC BLOOD PRESSURE: 81 MMHG | HEART RATE: 95 BPM | OXYGEN SATURATION: 99 % | WEIGHT: 112 LBS | BODY MASS INDEX: 18 KG/M2 | SYSTOLIC BLOOD PRESSURE: 147 MMHG

## 2017-10-23 DIAGNOSIS — J93.11 PRIMARY SPONTANEOUS PNEUMOTHORAX: Primary | ICD-10-CM

## 2017-10-23 DIAGNOSIS — J93.11 PRIMARY SPONTANEOUS PNEUMOTHORAX: ICD-10-CM

## 2017-10-23 PROCEDURE — 71020 HC CHEST PA AND LATERAL: CPT

## 2017-10-23 PROCEDURE — 99213 OFFICE O/P EST LOW 20 MIN: CPT | Performed by: THORACIC SURGERY (CARDIOTHORACIC VASCULAR SURGERY)

## 2017-10-23 NOTE — PROGRESS NOTES
Subjective   Patient ID: Louis Villalba is a 85 y.o. male who had a spontaneous left pneumothorax which was treated with chest tube decompression in the hospital at [facility].    History of Present Illness  Dear Colleague,  Louis Villalba was seen in our office today for continued follow up and surveillance after his recent left pneumothorax and hospitalization.  He has no complaints at this time.  An denies any shortness of breath.  He denies any complaints of fever, chills, cough, hemoptysis, pleuritic chest pain, shortness of air, dyspnea with exertion, night sweats, hoarseness, or unintentional weight loss. Underlying medical conditions including hyperlipidemia, meclizine degeneration and arthritis remain stable.  He has no other somatic complaints or alleviating or exacerbating factors aside from those mentioned above.    The following portions of the patient's history were reviewed and updated as appropriate: allergies, current medications, past family history, past medical history, past social history, past surgical history and problem list.  Review of Systems   Constitution: Negative.   HENT: Negative.    Eyes: Negative.    Cardiovascular: Negative.    Respiratory: Negative.    Endocrine: Negative.    Hematologic/Lymphatic: Negative.    Skin: Negative.    Musculoskeletal: Negative.    Gastrointestinal: Negative.    Genitourinary: Negative.    Neurological: Negative.    Psychiatric/Behavioral: Negative.      Patient Active Problem List   Diagnosis   • Gastric outlet obstruction   • BON (acute kidney injury)   • Primary spontaneous pneumothorax   • COPD (chronic obstructive pulmonary disease)     Past Medical History:   Diagnosis Date   • Anemia    • Arthritis    • Cancer     skin   • Colon polyp    • History of transfusion    • Hyperlipidemia    • Macular degeneration      Past Surgical History:   Procedure Laterality Date   • APPENDECTOMY  1958   • COLONOSCOPY  2013   • ENDOSCOPY N/A 10/5/2016     Procedure: ESOPHAGOGASTRODUODENOSCOPY with biopsy;  Surgeon: Edward Gan MD;  Location: [facility] ENDOSCOPY;  Service:    • SKIN BIOPSY       No family history on file.  Social History     Social History   • Marital status:      Spouse name: N/A   • Number of children: N/A   • Years of education: N/A     Occupational History   • Not on file.     Social History Main Topics   • Smoking status: Former Smoker     Quit date: 2010   • Smokeless tobacco: Never Used   • Alcohol use Yes      Comment: daily   • Drug use: No   • Sexual activity: Defer     Other Topics Concern   • Not on file     Social History Narrative       Current Outpatient Prescriptions:   •  acetaminophen (TYLENOL) 325 MG tablet, Take 2 tablets by mouth Every 6 (Six) Hours As Needed for Mild Pain ., Disp: , Rfl:   •  aspirin 81 MG chewable tablet, Chew 81 mg Daily., Disp: , Rfl:   •  atorvastatin (LIPITOR) 10 MG tablet, Take 10 mg by mouth Daily., Disp: , Rfl:   •  brimonidine-timolol (COMBIGAN) 0.2-0.5 % ophthalmic solution, Administer 1 drop to both eyes Every 12 (Twelve) Hours., Disp: , Rfl:   •  Multiple Vitamins-Minerals (VITEYES AREDS FORMULA PO), Take 2 tablets by mouth Daily., Disp: , Rfl:   •  pantoprazole (PROTONIX) 40 MG EC tablet, Take 40 mg by mouth Daily., Disp: , Rfl:   •  Tiotropium Bromide Monohydrate (SPIRIVA RESPIMAT) 2.5 MCG/ACT aerosol solution, Inhale 1 inhaler Daily., Disp: 1 inhaler, Rfl: 0  Allergies   Allergen Reactions   • Polymyxin B-Trimethoprim    • Sulfa Antibiotics Other (See Comments)     Cannot recall reaction        Objective   Vitals:    10/23/17 0927   BP: 147/81   Pulse: 95   SpO2: 99%     Physical Exam   Constitutional: He is oriented to person, place, and time. Vital signs are normal. He appears well-developed.   HENT:   Head: Normocephalic and atraumatic.   Eyes: EOM are normal. Pupils are equal, round, and reactive to light.   Neck: Normal range of motion. Neck supple. No JVD present. No tracheal  deviation present.   Cardiovascular: Normal rate, regular rhythm, normal heart sounds and intact distal pulses.    No murmur heard.  Pulmonary/Chest: Effort normal and breath sounds normal. He has no wheezes. He has no rhonchi. He has no rales. He exhibits no tenderness.   Abdominal: Soft. There is no tenderness.   Musculoskeletal: Normal range of motion. He exhibits no edema or tenderness.   Lymphadenopathy:     He has no cervical adenopathy.   Neurological: He is alert and oriented to person, place, and time. He has normal strength.   Skin: Skin is warm and dry. No rash noted. No cyanosis or erythema.   Psychiatric: He has a normal mood and affect. His behavior is normal. Thought content normal.     Independent Review of Radiographic Studies:       HISTORY: 85-year-old male with spontaneous pneumothorax presents for  follow-up evaluation.      COMPARISON: 10/06/2017      FINDINGS:  1. Chronic changes seen at the apices bilaterally but I see no evidence  of persistent pneumothorax.  2. Pleural plaquing and underlying chronic lung disease.  3. Vascular calcification, spondylosis, scoliosis and emphysema.      This report was finalized on 10/23/2017 11:27 AM by Dr. Ace Serra MD.  Assessment/Plan   Assessment:  Resolving spontaneous left pneumothorax.    Plan:  Mr. Villalba is doing well.  His chest x-ray continues to demonstrate improvement and no residual pneumothorax is appreciated on today's imaging.  At this point we will see him on an as-needed basis.  Should he require our services in the near future please do not hesitate to contact us. Should you have any questions or concerns regarding your patient's care, please do not hesitate to contact me.  Sincerely, Alphonse Gutiérrez M.D.  There are no diagnoses linked to this encounter.

## 2017-10-26 ENCOUNTER — TRANSCRIBE ORDERS (OUTPATIENT)
Dept: ADMINISTRATIVE | Facility: HOSPITAL | Age: 82
End: 2017-10-26

## 2017-10-26 DIAGNOSIS — J43.0 UNILATERAL EMPHYSEMA (HCC): Primary | ICD-10-CM

## 2017-11-08 ENCOUNTER — HOSPITAL ENCOUNTER (OUTPATIENT)
Dept: CT IMAGING | Facility: HOSPITAL | Age: 82
Discharge: HOME | End: 2017-11-08
Attending: INTERNAL MEDICINE | Admitting: INTERNAL MEDICINE

## 2017-11-08 DIAGNOSIS — J43.0 UNILATERAL EMPHYSEMA (HCC): ICD-10-CM

## 2017-11-08 PROCEDURE — 71250 CT THORAX DX C-: CPT

## 2017-12-06 ENCOUNTER — TRANSCRIBE ORDERS (OUTPATIENT)
Dept: ADMINISTRATIVE | Facility: HOSPITAL | Age: 82
End: 2017-12-06

## 2017-12-06 DIAGNOSIS — R91.8 LUNG NODULES: Primary | ICD-10-CM

## 2017-12-11 RX ORDER — PANTOPRAZOLE SODIUM 40 MG/1
TABLET, DELAYED RELEASE ORAL
Qty: 30 TABLET | Refills: 5 | Status: SHIPPED | OUTPATIENT
Start: 2017-12-11 | End: 2018-06-04 | Stop reason: SDUPTHER

## 2017-12-24 ENCOUNTER — HOSPITAL ENCOUNTER (OUTPATIENT)
Facility: HOSPITAL | Age: 82
Setting detail: OBSERVATION
Discharge: HOME | End: 2017-12-25
Attending: EMERGENCY MEDICINE | Admitting: INTERNAL MEDICINE

## 2017-12-24 ENCOUNTER — APPOINTMENT (OUTPATIENT)
Dept: GENERAL RADIOLOGY | Facility: HOSPITAL | Age: 82
End: 2017-12-24

## 2017-12-24 ENCOUNTER — APPOINTMENT (OUTPATIENT)
Dept: CT IMAGING | Facility: HOSPITAL | Age: 82
End: 2017-12-24

## 2017-12-24 DIAGNOSIS — J44.1 ACUTE EXACERBATION OF CHRONIC OBSTRUCTIVE PULMONARY DISEASE (COPD) (HCC): Primary | ICD-10-CM

## 2017-12-24 DIAGNOSIS — J10.1 INFLUENZA B: ICD-10-CM

## 2017-12-24 PROBLEM — R91.1 LUNG NODULE: Status: ACTIVE | Noted: 2017-12-24

## 2017-12-24 PROBLEM — N18.30 CKD (CHRONIC KIDNEY DISEASE) STAGE 3, GFR 30-59 ML/MIN (HCC): Status: ACTIVE | Noted: 2017-12-24

## 2017-12-24 PROBLEM — I10 HTN (HYPERTENSION): Status: ACTIVE | Noted: 2017-12-24

## 2017-12-24 LAB
ALBUMIN SERPL-MCNC: 4 G/DL (ref 3.5–5.2)
ALBUMIN/GLOB SERPL: 1 G/DL
ALP SERPL-CCNC: 116 U/L (ref 39–117)
ALT SERPL W P-5'-P-CCNC: 18 U/L (ref 1–41)
ANION GAP SERPL CALCULATED.3IONS-SCNC: 14.3 MMOL/L
AST SERPL-CCNC: 32 U/L (ref 1–40)
BACTERIA UR QL AUTO: ABNORMAL /HPF
BASOPHILS # BLD AUTO: 0.01 10*3/MM3 (ref 0–0.2)
BASOPHILS NFR BLD AUTO: 0.1 % (ref 0–1.5)
BILIRUB SERPL-MCNC: 0.3 MG/DL (ref 0.1–1.2)
BILIRUB UR QL STRIP: NEGATIVE
BUN BLD-MCNC: 27 MG/DL (ref 8–23)
BUN/CREAT SERPL: 16.3 (ref 7–25)
CALCIUM SPEC-SCNC: 8.8 MG/DL (ref 8.6–10.5)
CHLORIDE SERPL-SCNC: 96 MMOL/L (ref 98–107)
CLARITY UR: CLEAR
CO2 SERPL-SCNC: 28.7 MMOL/L (ref 22–29)
COLOR UR: YELLOW
CREAT BLD-MCNC: 1.66 MG/DL (ref 0.76–1.27)
D-LACTATE SERPL-SCNC: 1.3 MMOL/L (ref 0.5–2)
DEPRECATED RDW RBC AUTO: 51.1 FL (ref 37–54)
EOSINOPHIL # BLD AUTO: 0.01 10*3/MM3 (ref 0–0.7)
EOSINOPHIL NFR BLD AUTO: 0.1 % (ref 0.3–6.2)
ERYTHROCYTE [DISTWIDTH] IN BLOOD BY AUTOMATED COUNT: 14.2 % (ref 11.5–14.5)
FLUAV AG NPH QL: NEGATIVE
FLUBV AG NPH QL IA: POSITIVE
GFR SERPL CREATININE-BSD FRML MDRD: 40 ML/MIN/1.73
GLOBULIN UR ELPH-MCNC: 4.2 GM/DL
GLUCOSE BLD-MCNC: 106 MG/DL (ref 65–99)
GLUCOSE UR STRIP-MCNC: NEGATIVE MG/DL
HCT VFR BLD AUTO: 38.9 % (ref 40.4–52.2)
HGB BLD-MCNC: 12.6 G/DL (ref 13.7–17.6)
HGB UR QL STRIP.AUTO: NEGATIVE
HOLD SPECIMEN: NORMAL
HYALINE CASTS UR QL AUTO: ABNORMAL /LPF
IMM GRANULOCYTES # BLD: 0.05 10*3/MM3 (ref 0–0.03)
IMM GRANULOCYTES NFR BLD: 0.3 % (ref 0–0.5)
KETONES UR QL STRIP: NEGATIVE
LEUKOCYTE ESTERASE UR QL STRIP.AUTO: NEGATIVE
LYMPHOCYTES # BLD AUTO: 1.64 10*3/MM3 (ref 0.9–4.8)
LYMPHOCYTES NFR BLD AUTO: 9.2 % (ref 19.6–45.3)
MAGNESIUM SERPL-MCNC: 2.2 MG/DL (ref 1.6–2.4)
MCH RBC QN AUTO: 31.6 PG (ref 27–32.7)
MCHC RBC AUTO-ENTMCNC: 32.4 G/DL (ref 32.6–36.4)
MCV RBC AUTO: 97.5 FL (ref 79.8–96.2)
MONOCYTES # BLD AUTO: 2.51 10*3/MM3 (ref 0.2–1.2)
MONOCYTES NFR BLD AUTO: 14.1 % (ref 5–12)
NEUTROPHILS # BLD AUTO: 13.63 10*3/MM3 (ref 1.9–8.1)
NEUTROPHILS NFR BLD AUTO: 76.2 % (ref 42.7–76)
NITRITE UR QL STRIP: NEGATIVE
PH UR STRIP.AUTO: 6 [PH] (ref 5–8)
PLATELET # BLD AUTO: 145 10*3/MM3 (ref 140–500)
PMV BLD AUTO: 12.1 FL (ref 6–12)
POTASSIUM BLD-SCNC: 4.8 MMOL/L (ref 3.5–5.2)
PROCALCITONIN SERPL-MCNC: 0.93 NG/ML (ref 0.1–0.25)
PROT SERPL-MCNC: 8.2 G/DL (ref 6–8.5)
PROT UR QL STRIP: ABNORMAL
RBC # BLD AUTO: 3.99 10*6/MM3 (ref 4.6–6)
RBC # UR: ABNORMAL /HPF
REF LAB TEST METHOD: ABNORMAL
SODIUM BLD-SCNC: 139 MMOL/L (ref 136–145)
SP GR UR STRIP: 1.02 (ref 1–1.03)
SQUAMOUS #/AREA URNS HPF: ABNORMAL /HPF
TROPONIN T SERPL-MCNC: <0.01 NG/ML (ref 0–0.03)
UROBILINOGEN UR QL STRIP: ABNORMAL
WBC NRBC COR # BLD: 17.85 10*3/MM3 (ref 4.5–10.7)
WBC UR QL AUTO: ABNORMAL /HPF
WHOLE BLOOD HOLD SPECIMEN: NORMAL

## 2017-12-24 PROCEDURE — 84145 PROCALCITONIN (PCT): CPT | Performed by: EMERGENCY MEDICINE

## 2017-12-24 PROCEDURE — G0378 HOSPITAL OBSERVATION PER HR: HCPCS

## 2017-12-24 PROCEDURE — 96374 THER/PROPH/DIAG INJ IV PUSH: CPT

## 2017-12-24 PROCEDURE — 96361 HYDRATE IV INFUSION ADD-ON: CPT

## 2017-12-24 PROCEDURE — 71020 HC CHEST PA AND LATERAL: CPT

## 2017-12-24 PROCEDURE — 85025 COMPLETE CBC W/AUTO DIFF WBC: CPT | Performed by: EMERGENCY MEDICINE

## 2017-12-24 PROCEDURE — 25010000002 METHYLPREDNISOLONE PER 125 MG: Performed by: EMERGENCY MEDICINE

## 2017-12-24 PROCEDURE — 84484 ASSAY OF TROPONIN QUANT: CPT | Performed by: EMERGENCY MEDICINE

## 2017-12-24 PROCEDURE — 81001 URINALYSIS AUTO W/SCOPE: CPT | Performed by: EMERGENCY MEDICINE

## 2017-12-24 PROCEDURE — 94640 AIRWAY INHALATION TREATMENT: CPT

## 2017-12-24 PROCEDURE — 71250 CT THORAX DX C-: CPT

## 2017-12-24 PROCEDURE — 87804 INFLUENZA ASSAY W/OPTIC: CPT | Performed by: EMERGENCY MEDICINE

## 2017-12-24 PROCEDURE — 99284 EMERGENCY DEPT VISIT MOD MDM: CPT

## 2017-12-24 PROCEDURE — 36415 COLL VENOUS BLD VENIPUNCTURE: CPT | Performed by: EMERGENCY MEDICINE

## 2017-12-24 PROCEDURE — 83605 ASSAY OF LACTIC ACID: CPT | Performed by: EMERGENCY MEDICINE

## 2017-12-24 PROCEDURE — 87040 BLOOD CULTURE FOR BACTERIA: CPT | Performed by: EMERGENCY MEDICINE

## 2017-12-24 PROCEDURE — 25010000002 HEPARIN (PORCINE) PER 1000 UNITS: Performed by: INTERNAL MEDICINE

## 2017-12-24 PROCEDURE — 83735 ASSAY OF MAGNESIUM: CPT | Performed by: EMERGENCY MEDICINE

## 2017-12-24 PROCEDURE — 93005 ELECTROCARDIOGRAM TRACING: CPT

## 2017-12-24 PROCEDURE — 93010 ELECTROCARDIOGRAM REPORT: CPT | Performed by: INTERNAL MEDICINE

## 2017-12-24 PROCEDURE — 80053 COMPREHEN METABOLIC PANEL: CPT | Performed by: EMERGENCY MEDICINE

## 2017-12-24 RX ORDER — ATORVASTATIN CALCIUM 10 MG/1
10 TABLET, FILM COATED ORAL DAILY
Status: DISCONTINUED | OUTPATIENT
Start: 2017-12-25 | End: 2017-12-25 | Stop reason: HOSPADM

## 2017-12-24 RX ORDER — IPRATROPIUM BROMIDE AND ALBUTEROL SULFATE 2.5; .5 MG/3ML; MG/3ML
3 SOLUTION RESPIRATORY (INHALATION) ONCE
Status: COMPLETED | OUTPATIENT
Start: 2017-12-24 | End: 2017-12-24

## 2017-12-24 RX ORDER — DOCUSATE SODIUM 100 MG/1
100 CAPSULE, LIQUID FILLED ORAL DAILY
Status: ON HOLD | COMMUNITY
End: 2018-03-05

## 2017-12-24 RX ORDER — SODIUM CHLORIDE 0.9 % (FLUSH) 0.9 %
1-10 SYRINGE (ML) INJECTION AS NEEDED
Status: DISCONTINUED | OUTPATIENT
Start: 2017-12-24 | End: 2017-12-25 | Stop reason: HOSPADM

## 2017-12-24 RX ORDER — SODIUM CHLORIDE 9 MG/ML
75 INJECTION, SOLUTION INTRAVENOUS CONTINUOUS
Status: ACTIVE | OUTPATIENT
Start: 2017-12-24 | End: 2017-12-25

## 2017-12-24 RX ORDER — SODIUM CHLORIDE 0.9 % (FLUSH) 0.9 %
10 SYRINGE (ML) INJECTION AS NEEDED
Status: DISCONTINUED | OUTPATIENT
Start: 2017-12-24 | End: 2017-12-25 | Stop reason: HOSPADM

## 2017-12-24 RX ORDER — ONDANSETRON 2 MG/ML
4 INJECTION INTRAMUSCULAR; INTRAVENOUS EVERY 6 HOURS PRN
Status: DISCONTINUED | OUTPATIENT
Start: 2017-12-24 | End: 2017-12-25 | Stop reason: HOSPADM

## 2017-12-24 RX ORDER — HEPARIN SODIUM 5000 [USP'U]/ML
5000 INJECTION, SOLUTION INTRAVENOUS; SUBCUTANEOUS EVERY 12 HOURS SCHEDULED
Status: DISCONTINUED | OUTPATIENT
Start: 2017-12-24 | End: 2017-12-25 | Stop reason: HOSPADM

## 2017-12-24 RX ORDER — PANTOPRAZOLE SODIUM 40 MG/1
40 TABLET, DELAYED RELEASE ORAL
Status: DISCONTINUED | OUTPATIENT
Start: 2017-12-25 | End: 2017-12-25 | Stop reason: HOSPADM

## 2017-12-24 RX ORDER — SODIUM CHLORIDE 9 MG/ML
75 INJECTION, SOLUTION INTRAVENOUS CONTINUOUS
Status: DISCONTINUED | OUTPATIENT
Start: 2017-12-24 | End: 2017-12-24

## 2017-12-24 RX ORDER — PREDNISONE 20 MG/1
20 TABLET ORAL
Status: DISCONTINUED | OUTPATIENT
Start: 2017-12-25 | End: 2017-12-25 | Stop reason: HOSPADM

## 2017-12-24 RX ORDER — IPRATROPIUM BROMIDE AND ALBUTEROL SULFATE 2.5; .5 MG/3ML; MG/3ML
3 SOLUTION RESPIRATORY (INHALATION)
Status: DISCONTINUED | OUTPATIENT
Start: 2017-12-24 | End: 2017-12-25 | Stop reason: HOSPADM

## 2017-12-24 RX ORDER — ONDANSETRON 4 MG/1
4 TABLET, ORALLY DISINTEGRATING ORAL EVERY 6 HOURS PRN
Status: DISCONTINUED | OUTPATIENT
Start: 2017-12-24 | End: 2017-12-25 | Stop reason: HOSPADM

## 2017-12-24 RX ORDER — ASPIRIN 81 MG/1
81 TABLET, CHEWABLE ORAL DAILY
Status: DISCONTINUED | OUTPATIENT
Start: 2017-12-25 | End: 2017-12-25 | Stop reason: HOSPADM

## 2017-12-24 RX ORDER — TAMSULOSIN HYDROCHLORIDE 0.4 MG/1
1 CAPSULE ORAL DAILY
COMMUNITY
End: 2019-12-30 | Stop reason: SDUPTHER

## 2017-12-24 RX ORDER — METHYLPREDNISOLONE SODIUM SUCCINATE 125 MG/2ML
80 INJECTION, POWDER, LYOPHILIZED, FOR SOLUTION INTRAMUSCULAR; INTRAVENOUS ONCE
Status: COMPLETED | OUTPATIENT
Start: 2017-12-24 | End: 2017-12-24

## 2017-12-24 RX ORDER — ACETAMINOPHEN 325 MG/1
650 TABLET ORAL EVERY 6 HOURS PRN
Status: DISCONTINUED | OUTPATIENT
Start: 2017-12-24 | End: 2017-12-25 | Stop reason: HOSPADM

## 2017-12-24 RX ORDER — BRIMONIDINE TARTRATE AND TIMOLOL MALEATE 2; 5 MG/ML; MG/ML
1 SOLUTION OPHTHALMIC EVERY 12 HOURS
Status: DISCONTINUED | OUTPATIENT
Start: 2017-12-25 | End: 2017-12-25 | Stop reason: HOSPADM

## 2017-12-24 RX ORDER — ONDANSETRON 4 MG/1
4 TABLET, FILM COATED ORAL EVERY 6 HOURS PRN
Status: DISCONTINUED | OUTPATIENT
Start: 2017-12-24 | End: 2017-12-25 | Stop reason: HOSPADM

## 2017-12-24 RX ADMIN — HEPARIN SODIUM 5000 UNITS: 5000 INJECTION, SOLUTION INTRAVENOUS; SUBCUTANEOUS at 22:38

## 2017-12-24 RX ADMIN — METHYLPREDNISOLONE SODIUM SUCCINATE 80 MG: 125 INJECTION, POWDER, FOR SOLUTION INTRAMUSCULAR; INTRAVENOUS at 20:22

## 2017-12-24 RX ADMIN — SODIUM CHLORIDE 75 ML/HR: 9 INJECTION, SOLUTION INTRAVENOUS at 22:38

## 2017-12-24 RX ADMIN — SODIUM CHLORIDE 1000 ML: 9 INJECTION, SOLUTION INTRAVENOUS at 17:22

## 2017-12-24 RX ADMIN — IPRATROPIUM BROMIDE AND ALBUTEROL SULFATE 3 ML: .5; 3 SOLUTION RESPIRATORY (INHALATION) at 20:05

## 2017-12-24 NOTE — ED TRIAGE NOTES
Pt c/o weakness, low blood pressure, and sore throat for several days. Per pt's daughter, pt has been confused for about one week.

## 2017-12-24 NOTE — ED PROVIDER NOTES
" EMERGENCY DEPARTMENT ENCOUNTER    CHIEF COMPLAINT  Chief Complaint: Dry cough  History given by: Pt  History limited by: Nothing  Room Number: 30/30  PMD: Adam Feliz MD  Pulmonologist: Dr. DANIELS    HPI:  Pt is a 85 y.o. male with a hx of anemia and arthritis who presents complaining of dry cough onset two days ago. He also complains of congestion, sore throat, and diarrhea but denies fever, vomiting, dysuria, frequency, or any other symptoms at this time. Pt reports he received his annual flu vaccination.    Duration:  Two days  Onset: gradual  Timing: constant  Location: n/a  Radiation: none  Quality: \"dry\"  Intensity/Severity: moderate  Progression: unchanged  Associated Symptoms: congestion, sore throat, and diarrhea   Aggravating Factors: none  Alleviating Factors: none  Previous Episodes: none  Treatment before arrival: Pt received no treatment PTA.    PAST MEDICAL HISTORY  Active Ambulatory Problems     Diagnosis Date Noted   • Gastric outlet obstruction 10/01/2016   • BON (acute kidney injury) 10/05/2016   • Primary spontaneous pneumothorax 10/03/2017   • COPD (chronic obstructive pulmonary disease) 10/03/2017     Resolved Ambulatory Problems     Diagnosis Date Noted   • Peptic ulcer disease 10/06/2016     Past Medical History:   Diagnosis Date   • Anemia    • Arthritis    • Cancer    • Colon polyp    • History of transfusion    • Hyperlipidemia    • Macular degeneration        PAST SURGICAL HISTORY  Past Surgical History:   Procedure Laterality Date   • APPENDECTOMY  1958   • COLONOSCOPY  2013   • ENDOSCOPY N/A 10/5/2016    Procedure: ESOPHAGOGASTRODUODENOSCOPY with biopsy;  Surgeon: Edward Gan MD;  Location: [facility] ENDOSCOPY;  Service:    • SKIN BIOPSY         FAMILY HISTORY  History reviewed. No pertinent family history.    SOCIAL HISTORY  Social History     Social History   • Marital status:      Spouse name: N/A   • Number of children: N/A   • Years of education: N/A     Occupational " History   • Not on file.     Social History Main Topics   • Smoking status: Former Smoker     Quit date: 2010   • Smokeless tobacco: Never Used   • Alcohol use Yes      Comment: daily   • Drug use: No   • Sexual activity: Defer     Other Topics Concern   • Not on file     Social History Narrative       ALLERGIES  Polymyxin b-trimethoprim and Sulfa antibiotics    REVIEW OF SYSTEMS  Review of Systems   Constitutional: Negative for activity change, appetite change and fever.   HENT: Positive for congestion and sore throat.    Eyes: Negative.    Respiratory: Positive for cough (dry). Negative for shortness of breath.    Cardiovascular: Negative for chest pain and leg swelling.   Gastrointestinal: Positive for diarrhea. Negative for abdominal pain and vomiting.   Endocrine: Negative.    Genitourinary: Negative for decreased urine volume and dysuria.   Musculoskeletal: Negative for neck pain.   Skin: Negative for rash and wound.   Allergic/Immunologic: Negative.    Neurological: Negative for weakness, numbness and headaches.   Hematological: Negative.    Psychiatric/Behavioral: Negative.    All other systems reviewed and are negative.      PHYSICAL EXAM  ED Triage Vitals   Temp Heart Rate Resp BP SpO2   12/24/17 1457 12/24/17 1415 12/24/17 1415 12/24/17 1458 12/24/17 1415   99.6 °F (37.6 °C) 103 16 129/73 98 %      Temp src Heart Rate Source Patient Position BP Location FiO2 (%)   12/24/17 1457 -- 12/24/17 1458 12/24/17 1458 --   Tympanic  Sitting Left arm        Physical Exam   Constitutional: He is oriented to person, place, and time and well-developed, well-nourished, and in no distress.   HENT:   Head: Normocephalic and atraumatic.   Eyes: EOM are normal. Pupils are equal, round, and reactive to light.   Neck: Normal range of motion. Neck supple.   Cardiovascular: Normal rate, regular rhythm and normal heart sounds.    Pulmonary/Chest: Effort normal. No respiratory distress. He has decreased breath sounds  (bilaterally).   Abdominal: Soft. There is no tenderness. There is no rebound and no guarding.   Musculoskeletal: Normal range of motion. He exhibits no edema.   Neurological: He is alert and oriented to person, place, and time. He has normal sensation and normal strength.   Skin: Skin is warm and dry.   Psychiatric: Mood and affect normal.   Nursing note and vitals reviewed.      LAB RESULTS  Lab Results (last 24 hours)     Procedure Component Value Units Date/Time    CBC & Differential [[id number]] Collected:  12/24/17 1523    Specimen:  Blood Updated:  12/24/17 1556    Narrative:       The following orders were created for panel order CBC & Differential.  Procedure                               Abnormality         Status                     ---------                               -----------         ------                     CBC Auto Differential[[id number]]        Abnormal            Final result                 Please view results for these tests on the individual orders.    Comprehensive Metabolic Panel [[id number]]  (Abnormal) Collected:  12/24/17 1523    Specimen:  Blood Updated:  12/24/17 1628     Glucose 106 (H) mg/dL      BUN 27 (H) mg/dL      Creatinine 1.66 (H) mg/dL      Sodium 139 mmol/L      Potassium 4.8 mmol/L      Chloride 96 (L) mmol/L      CO2 28.7 mmol/L      Calcium 8.8 mg/dL      Total Protein 8.2 g/dL      Albumin 4.00 g/dL      ALT (SGPT) 18 U/L      AST (SGOT) 32 U/L      Alkaline Phosphatase 116 U/L      Total Bilirubin 0.3 mg/dL      eGFR Non African Amer 40 (L) mL/min/1.73      Globulin 4.2 gm/dL      A/G Ratio 1.0 g/dL      BUN/Creatinine Ratio 16.3     Anion Gap 14.3 mmol/L     Narrative:       The MDRD GFR formula is only valid for adults with stable renal function between ages 18 and 70.    Troponin [[id number]]  (Normal) Collected:  12/24/17 1523    Specimen:  Blood Updated:  12/24/17 1628     Troponin T <0.010 ng/mL     Narrative:       Troponin T Reference Ranges:  Less than 0.03  "ng/mL:    Negative for AMI  0.03 to 0.09 ng/mL:      Indeterminant for AMI  Greater than 0.09 ng/mL: Positive for AMI    Magnesium [[id number]]  (Normal) Collected:  12/24/17 1523    Specimen:  Blood Updated:  12/24/17 1628     Magnesium 2.2 mg/dL     CBC Auto Differential [[id number]]  (Abnormal) Collected:  12/24/17 1523    Specimen:  Blood Updated:  12/24/17 1556     WBC 17.85 (H) 10*3/mm3      RBC 3.99 (L) 10*6/mm3      Hemoglobin 12.6 (L) g/dL      Hematocrit 38.9 (L) %      MCV 97.5 (H) fL      MCH 31.6 pg      MCHC 32.4 (L) g/dL      RDW 14.2 %      RDW-SD 51.1 fl      MPV 12.1 (H) fL      Platelets 145 10*3/mm3      Neutrophil % 76.2 (H) %      Lymphocyte % 9.2 (L) %      Monocyte % 14.1 (H) %      Eosinophil % 0.1 (L) %      Basophil % 0.1 %      Immature Grans % 0.3 %      Neutrophils, Absolute 13.63 (H) 10*3/mm3      Lymphocytes, Absolute 1.64 10*3/mm3      Monocytes, Absolute 2.51 (H) 10*3/mm3      Eosinophils, Absolute 0.01 10*3/mm3      Basophils, Absolute 0.01 10*3/mm3      Immature Grans, Absolute 0.05 (H) 10*3/mm3     Procalcitonin [[id number]]  (Abnormal) Collected:  12/24/17 1523    Specimen:  Blood Updated:  12/24/17 1732     Procalcitonin 0.93 (C) ng/mL     Narrative:       As a Marker for Sepsis (Non-Neonates):   1. <0.5 ng/mL represents a low risk of severe sepsis and/or septic shock.  1. >2 ng/mL represents a high risk of severe sepsis and/or septic shock.    As a Marker for Lower Respiratory Tract Infections that require antibiotic therapy:  PCT on Admission     Antibiotic Therapy             6-12 Hrs later  > 0.5                Strongly Recommended            >0.25 - <0.5         Recommended  0.1 - 0.25           Discouraged                   Remeasure/reassess PCT  <0.1                 Strongly Discouraged          Remeasure/reassess PCT      As 28 day mortality risk marker: \"Change in Procalcitonin Result\" (> 80 % or <=80 %) if Day 0 (or Day 1) and Day 4 values are available. Refer to " http://www.[facility]-pct-calculator.com/   Change in PCT <=80 %   A decrease of PCT levels below or equal to 80 % defines a positive change in PCT test result representing a higher risk for 28-day all-cause mortality of patients diagnosed with severe sepsis or septic shock.  Change in PCT > 80 %   A decrease of PCT levels of more than 80 % defines a negative change in PCT result representing a lower risk for 28-day all-cause mortality of patients diagnosed with severe sepsis or septic shock.                Urinalysis With / Culture If Indicated - Urine, Clean Catch [[id number]]  (Abnormal) Collected:  12/24/17 1637    Specimen:  Urine from Urine, Clean Catch Updated:  12/24/17 1803     Color, UA Yellow     Appearance, UA Clear     pH, UA 6.0     Specific Gravity, UA 1.019     Glucose, UA Negative     Ketones, UA Negative     Bilirubin, UA Negative     Blood, UA Negative     Protein,  mg/dL (2+) (A)     Leuk Esterase, UA Negative     Nitrite, UA Negative     Urobilinogen, UA 0.2 E.U./dL    Urinalysis, Microscopic Only - Urine, Clean Catch [[id number]]  (Abnormal) Collected:  12/24/17 1637    Specimen:  Urine from Urine, Clean Catch Updated:  12/24/17 1803     RBC, UA 0-2 /HPF      WBC, UA 3-5 (A) /HPF      Bacteria, UA None Seen /HPF      Squamous Epithelial Cells, UA 0-2 /HPF      Hyaline Casts, UA 7-12 /LPF      Methodology Automated Microscopy    Lactic Acid, Plasma [[id number]]  (Normal) Collected:  12/24/17 1721    Specimen:  Blood Updated:  12/24/17 1804     Lactate 1.3 mmol/L     Influenza Antigen, Rapid - Swab, Nasopharynx [[id number]]  (Abnormal) Collected:  12/24/17 1721    Specimen:  Swab from Nasopharynx Updated:  12/24/17 1749     Influenza A Ag, EIA Negative     Influenza B Ag, EIA Positive (A)          I ordered the above labs and reviewed the results    RADIOLOGY  XR Chest 2 View   Final Result   XR CHEST 2 VW-     Clinical: Nonproductive cough and fever with a history of COPD     COMPARISON CT  11/08/2017 and chest radiograph 10/23/2017     FINDINGS: There are again demonstrated calcified benign granulomas and  calcified pleural plaques within both lungs. There is underlying  COPD/emphysema. The heart size is normal. No consolidation, edema or  effusion has developed. No new pulmonary mass has developed.     CONCLUSION: Stable chest, no acute cardiovascular or pulmonary process  identified.     This report was finalized on 12/24/2017 4:41 PM by Dr. Jian Mejía MD.      CT Chest Without Contrast       THORACIC CT SCAN WITHOUT CONTRAST     HISTORY: pneumonia     COMPARISON: 11/08/2017.     TECHNIQUE:  Radiation dose reduction techniques were utilized, including  automated exposure control and exposure modulation based on body size.  Axial images of the thorax obtained without IV contrast, per request.     FINDINGS: There is a stable 4 mm left upper lobe nodule posteriorly on  image 20. Favor small granuloma but follow-up recommended. There are  emphysematous and fibrotic changes within hyperexpanded lungs suggesting  underlying COPD. There are a multitude of pleural-based calcifications  which appear stable. Apical fibrosis is stable as well. There are  calcified residua of granulomatous disease present. There is no  convincing evidence of active air space disease process otherwise. No  obvious adenopathy by noncontrast technique. Normal heart size.  Encompassed aorta is non-aneurysmal.     Unenhanced images of the included upper abdomen are otherwise  unremarkable.        I ordered the above noted radiological studies. Interpreted by radiologist. Reviewed by me in PACS.       PROCEDURES  Procedures    EKG           EKG time: 1622  Rhythm/Rate: NSR, 99  P waves and AR: nml  QRS, axis: nml   ST and T waves: nml     Interpreted Contemporaneously by me, independently viewed  Unchanged compared to prior 10/3/2017  PROGRESS AND CONSULTS  ED Course   Comment By Time   8:11 PM  Patient with COPD and appears to  have influenza B.  Some wheezing.  No evidence of pneumonia despite elevated WBC and PCT.  Cultures obtained.  Given breathing treatments and steroids.  Discussed with Dr. Griffith who will admit. Aren Santana MD 12/24 2012     1501 Ordered CBC, UA, CMP, Magnesium, CXR, and Troponin for further evaluation    1732 Ordered CT Chest for further evaluation    1909 BP- 145/71 HR- 102 Temp- 99.6 °F (37.6 °C) (Tympanic) O2 sat- 93%. Rechecked the patient who is in NAD and is resting comfortably. Informed pt of positive Influenza swab, elevated procalcitonin, and elevated WBC. Informed pt of pending CT results. Pt understands and agrees with the plan. All questions answered.    1953 BP- 137/68 HR- 98 Temp- 99.6 °F (37.6 °C) (Tympanic) O2 sat- 93%. Rechecked the patient who is in NAD and is resting comfortably. Informed pt and family of imaging results which show stable lung nodule and COPD changes. Will consult hospitalist for admission. Pt understands and agrees with the plan. All questions answered.    1955 Placed call to A. Ordered duoneb for treatment of symptoms.    MEDICAL DECISION MAKING  Results were reviewed/discussed with the patient and they were also made aware of online access. Pt also made aware that some labs, such as cultures, will not be resulted during ER visit and follow up with PMD is necessary.     MDM  Number of Diagnoses or Management Options     Amount and/or Complexity of Data Reviewed  Clinical lab tests: ordered and reviewed (Influenza B - Positive, Lactate - 1.3, Procalcitonin - .93, WBC - 17.85, Troponin - <.010)  Tests in the radiology section of CPT®: ordered and reviewed (CT Chest shows 4mm L upper lobe nodule and COPD changes.)  Tests in the medicine section of CPT®: ordered and reviewed (See EKG procedure note.)  Decide to obtain previous medical records or to obtain history from someone other than the patient: yes  Review and summarize past medical records: yes  Independent  visualization of images, tracings, or specimens: yes           DIAGNOSIS  Final diagnoses:   Acute exacerbation of chronic obstructive pulmonary disease (COPD)   Influenza B       DISPOSITION  admit    Latest Documented Vital Signs:  As of 8:16 PM  BP- 137/68 HR- 98 Temp- 99.6 °F (37.6 °C) (Tympanic) O2 sat- 92%    --  Documentation assistance provided by arabella Villanueva for Dr. Santana.  Information recorded by the scribe was done at my direction and has been verified and validated by me.         Ami Villanueva  12/24/17 1956       Aren Santana MD  12/24/17 2017

## 2017-12-25 VITALS
RESPIRATION RATE: 16 BRPM | TEMPERATURE: 97.4 F | BODY MASS INDEX: 18.55 KG/M2 | HEART RATE: 90 BPM | SYSTOLIC BLOOD PRESSURE: 122 MMHG | DIASTOLIC BLOOD PRESSURE: 67 MMHG | WEIGHT: 111.33 LBS | HEIGHT: 65 IN | OXYGEN SATURATION: 94 %

## 2017-12-25 LAB
ANION GAP SERPL CALCULATED.3IONS-SCNC: 15.7 MMOL/L
BASOPHILS # BLD AUTO: 0 10*3/MM3 (ref 0–0.2)
BASOPHILS NFR BLD AUTO: 0 % (ref 0–1.5)
BUN BLD-MCNC: 22 MG/DL (ref 8–23)
BUN/CREAT SERPL: 19.6 (ref 7–25)
CALCIUM SPEC-SCNC: 8 MG/DL (ref 8.6–10.5)
CHLORIDE SERPL-SCNC: 101 MMOL/L (ref 98–107)
CO2 SERPL-SCNC: 22.3 MMOL/L (ref 22–29)
CREAT BLD-MCNC: 1.12 MG/DL (ref 0.76–1.27)
DEPRECATED RDW RBC AUTO: 50.9 FL (ref 37–54)
EOSINOPHIL # BLD AUTO: 0 10*3/MM3 (ref 0–0.7)
EOSINOPHIL NFR BLD AUTO: 0 % (ref 0.3–6.2)
ERYTHROCYTE [DISTWIDTH] IN BLOOD BY AUTOMATED COUNT: 14.1 % (ref 11.5–14.5)
GFR SERPL CREATININE-BSD FRML MDRD: 62 ML/MIN/1.73
GLUCOSE BLD-MCNC: 146 MG/DL (ref 65–99)
HCT VFR BLD AUTO: 34.2 % (ref 40.4–52.2)
HGB BLD-MCNC: 10.8 G/DL (ref 13.7–17.6)
IMM GRANULOCYTES # BLD: 0.03 10*3/MM3 (ref 0–0.03)
IMM GRANULOCYTES NFR BLD: 0.2 % (ref 0–0.5)
LYMPHOCYTES # BLD AUTO: 0.49 10*3/MM3 (ref 0.9–4.8)
LYMPHOCYTES NFR BLD AUTO: 4 % (ref 19.6–45.3)
MCH RBC QN AUTO: 31 PG (ref 27–32.7)
MCHC RBC AUTO-ENTMCNC: 31.6 G/DL (ref 32.6–36.4)
MCV RBC AUTO: 98.3 FL (ref 79.8–96.2)
MONOCYTES # BLD AUTO: 0.44 10*3/MM3 (ref 0.2–1.2)
MONOCYTES NFR BLD AUTO: 3.6 % (ref 5–12)
NEUTROPHILS # BLD AUTO: 11.35 10*3/MM3 (ref 1.9–8.1)
NEUTROPHILS NFR BLD AUTO: 92.2 % (ref 42.7–76)
PLATELET # BLD AUTO: 122 10*3/MM3 (ref 140–500)
PMV BLD AUTO: 12.1 FL (ref 6–12)
POTASSIUM BLD-SCNC: 4 MMOL/L (ref 3.5–5.2)
PROCALCITONIN SERPL-MCNC: 0.64 NG/ML (ref 0.1–0.25)
RBC # BLD AUTO: 3.48 10*6/MM3 (ref 4.6–6)
SODIUM BLD-SCNC: 139 MMOL/L (ref 136–145)
WBC NRBC COR # BLD: 12.31 10*3/MM3 (ref 4.5–10.7)

## 2017-12-25 PROCEDURE — 84145 PROCALCITONIN (PCT): CPT | Performed by: INTERNAL MEDICINE

## 2017-12-25 PROCEDURE — 94799 UNLISTED PULMONARY SVC/PX: CPT

## 2017-12-25 PROCEDURE — 80048 BASIC METABOLIC PNL TOTAL CA: CPT | Performed by: INTERNAL MEDICINE

## 2017-12-25 PROCEDURE — 85025 COMPLETE CBC W/AUTO DIFF WBC: CPT | Performed by: INTERNAL MEDICINE

## 2017-12-25 PROCEDURE — G0378 HOSPITAL OBSERVATION PER HR: HCPCS

## 2017-12-25 PROCEDURE — 94620 HC PULMONARY STRESS TEST SIMPLE: CPT

## 2017-12-25 PROCEDURE — 25010000002 HEPARIN (PORCINE) PER 1000 UNITS: Performed by: INTERNAL MEDICINE

## 2017-12-25 PROCEDURE — 63710000001 PREDNISONE PER 1 MG: Performed by: INTERNAL MEDICINE

## 2017-12-25 RX ORDER — ALBUTEROL SULFATE 90 UG/1
2 AEROSOL, METERED RESPIRATORY (INHALATION) EVERY 4 HOURS PRN
Qty: 2 INHALER | Refills: 0 | Status: SHIPPED | OUTPATIENT
Start: 2017-12-25

## 2017-12-25 RX ORDER — PREDNISONE 20 MG/1
20 TABLET ORAL
Qty: 7 TABLET | Refills: 0 | Status: ON HOLD | OUTPATIENT
Start: 2017-12-26 | End: 2018-03-05

## 2017-12-25 RX ADMIN — ATORVASTATIN CALCIUM 10 MG: 10 TABLET, FILM COATED ORAL at 09:00

## 2017-12-25 RX ADMIN — ASPIRIN 81 MG: 81 TABLET, CHEWABLE ORAL at 09:00

## 2017-12-25 RX ADMIN — PREDNISONE 20 MG: 20 TABLET ORAL at 09:00

## 2017-12-25 RX ADMIN — IPRATROPIUM BROMIDE AND ALBUTEROL SULFATE 3 ML: .5; 3 SOLUTION RESPIRATORY (INHALATION) at 00:56

## 2017-12-25 RX ADMIN — BRIMONIDINE TARTRATE, TIMOLOL MALEATE 1 DROP: 2; 5 SOLUTION/ DROPS OPHTHALMIC at 09:00

## 2017-12-25 RX ADMIN — IPRATROPIUM BROMIDE AND ALBUTEROL SULFATE 3 ML: .5; 3 SOLUTION RESPIRATORY (INHALATION) at 10:41

## 2017-12-25 RX ADMIN — IPRATROPIUM BROMIDE AND ALBUTEROL SULFATE 3 ML: .5; 3 SOLUTION RESPIRATORY (INHALATION) at 14:00

## 2017-12-25 RX ADMIN — IPRATROPIUM BROMIDE AND ALBUTEROL SULFATE 3 ML: .5; 3 SOLUTION RESPIRATORY (INHALATION) at 08:09

## 2017-12-25 RX ADMIN — HEPARIN SODIUM 5000 UNITS: 5000 INJECTION, SOLUTION INTRAVENOUS; SUBCUTANEOUS at 09:00

## 2017-12-25 RX ADMIN — IPRATROPIUM BROMIDE AND ALBUTEROL SULFATE 3 ML: .5; 3 SOLUTION RESPIRATORY (INHALATION) at 04:11

## 2017-12-25 RX ADMIN — PANTOPRAZOLE SODIUM 40 MG: 40 TABLET, DELAYED RELEASE ORAL at 06:09

## 2017-12-25 NOTE — PLAN OF CARE
Problem: Patient Care Overview (Adult)  Goal: Plan of Care Review  Outcome: Ongoing (interventions implemented as appropriate)    VSS, on 2L oxygen via NC.  In Airborne precautions due to Influenza B+. Fall precautions.      12/25/17 0250   Coping/Psychosocial Response Interventions   Plan Of Care Reviewed With patient   Patient Care Overview   Progress progress toward functional goals as expected       Problem: Fall Risk (Adult)  Goal: Identify Related Risk Factors and Signs and Symptoms  Outcome: Outcome(s) achieved Date Met: 12/25/17 12/25/17 0250   Fall Risk   Fall Risk: Related Risk Factors age-related changes;environment unfamiliar   Fall Risk: Signs and Symptoms presence of risk factors     Goal: Absence of Falls  Outcome: Ongoing (interventions implemented as appropriate)   12/25/17 0250   Fall Risk (Adult)   Absence of Falls making progress toward outcome       Problem: COPD, Chronic Bronchitis/Emphysema (Adult)  Goal: Signs and Symptoms of Listed Potential Problems Will be Absent or Manageable (COPD, Chronic Bronchitis/Emphysema)  Outcome: Ongoing (interventions implemented as appropriate)   12/25/17 0250   COPD, Chronic Bronchitis/Emphysema   Problems Assessed (COPD, Chronic Bronchitis/Emphysema) all   Problems Present (COPD, Chronic Bronchitis/Emphysema) situational response

## 2017-12-25 NOTE — PROGRESS NOTES
Exercise Oximetry    Patient Name:Louis Villalba   MRN: [id number]   Date: 12/25/17             ROOM AIR BASELINE   SpO2% 91   Heart Rate    Blood Pressure      EXERCISE ON ROOM AIR SpO2% EXERCISE ON O2 @ LPM SpO2%   1 MINUTE 91 1 MINUTE    2 MINUTES 92 2 MINUTES    3 MINUTES 93 3 MINUTES    4 MINUTES 92 4 MINUTES    5 MINUTES 92 5 MINUTES    6 MINUTES 92 6 MINUTES               Distance Walked  Distance Walked   Dyspnea (Kody Scale)   Dyspnea (Kody Scale)   Fatigue (Kody Scale)   Fatigue (Kody Scale)   SpO2% Post Exercise  94 SpO2% Post Exercise   HR Post Exercise   HR Post Exercise   Time to Recovery   Time to Recovery     Comments: Pt completed full 6 minute walk on room air, sats maintained 91-94%. No SOA was noted.

## 2017-12-25 NOTE — ED NOTES
Report called and given to Lori KAUR on 5 E 556, updated on pt current status, discussed completed ER orders, and admitting diagnosis. RN had no further questions at this time. Pt and family had no questions for this RN.     Beatriz Live RN  12/24/17 6421

## 2017-12-25 NOTE — DISCHARGE SUMMARY
Name: Louis Villalba  Age: 85 y.o.  Sex: male  :  10/20/1932  MRN: [id number]         Primary Care Physician: Adam Feliz MD      Date of Admission:  2017  Date of Discharge:  2017      CHIEF COMPLAINT  Hypotension and Fatigue      DISCHARGE DIAGNOSIS  Active Hospital Problems (** Indicates Principal Problem)    Diagnosis Date Noted   • **Acute exacerbation of chronic obstructive pulmonary disease (COPD) [J44.1] 2017   • Influenza B [J10.1] 2017   • HTN (hypertension) [I10] 2017   • Lung nodule [R91.1] 2017   • CKD (chronic kidney disease) stage 3, GFR 30-59 ml/min [N18.3] 2017      Resolved Hospital Problems    Diagnosis Date Noted Date Resolved   • BON (acute kidney injury) [N17.9] 10/05/2016 2017       SECONDARY DIAGNOSES  Past Medical History:   Diagnosis Date   • Anemia    • Arthritis    • Cancer     skin: BASAL CELL on face, excised- remote   • Colon polyp    • History of transfusion    • Hyperlipidemia    • Macular degeneration        CONSULTS   None  Consult Orders (all)     Start     Ordered    17  [facility] (on-call MD unless specified)  Once     Specialty:  Internal Medicine  Provider:  (Not yet assigned)    17        Consulting Physician(s)             None              PROCEDURES PERFORMED  None        HOSPITAL COURSE    The patient is an 85-year-old male with history of COPD, asbestosis, CK D stage III [facility] for a 5-6 day history of nonproductive cough, congestion in the chest, shortness of enough healing well.  Was diagnosed with influenza B and COPD exacerbation and was admitted to our service.  He had one dose of IV Solu-Medrol the emergency room.  Due to the delay in arrival he was not candidate for Tamiflu.  By hospital day #1 he improved and felt almost completely back to normal.  His daughter is at bedside in agreement she looks much better.  He had mild elevation of creatinine above his baseline and was  given IV fluids.  CT chest was negative for any pneumonia and he was kept off antibiotics.  His white blood cell count has decreased although not quite returned to normal.  Blood cultures were obtained and are pending at this time but he is improved without any antibiotics so bacteremia is thought to be highly unlikely at this time.  I discussed discharge planning with the patient and he is in agreement.  We'll discharge her with 7 more days of prednisone and he is to follow-up with his pulmonologist in about 1-2 weeks.    Of note, I will have a 6 minute walking oximetry test prior to discharge and will prescribe oxygen if he qualifies.  Currently he is satting 93-95% on room air  PHYSICAL EXAM  Temp:  [97.4 °F (36.3 °C)-99.6 °F (37.6 °C)] 97.4 °F (36.3 °C)  Heart Rate:  [] 89  Resp:  [14-20] 16  BP: (122-145)/(67-91) 122/67  Body mass index is 18.53 kg/(m^2).  Physical Exam   Constitutional: He is oriented to person, place, and time. He appears well-developed and well-nourished.   Cardiovascular: Normal rate, regular rhythm and normal heart sounds.  Exam reveals no friction rub.    No murmur heard.  Pulmonary/Chest: Effort normal and breath sounds normal. No respiratory distress. He has no wheezes.   Good air movement and no wheezes   Abdominal: Soft. Bowel sounds are normal. He exhibits no distension. There is no tenderness.   Musculoskeletal: He exhibits no edema or tenderness.   Neurological: He is alert and oriented to person, place, and time.   Skin: He is not diaphoretic.   Psychiatric: He has a normal mood and affect. His behavior is normal.   Nursing note and vitals reviewed.        CONDITION ON DISCHARGE  Stable.      DISCHARGE DISPOSITION   Home      ALLERGIES  Allergies   Allergen Reactions   • Polymyxin B-Trimethoprim    • Sulfa Antibiotics Other (See Comments)     Cannot recall reaction         DISCHARGE MEDICATIONS     Your medication list      START taking these medications       Instructions  Last Dose Given Next Dose Due    albuterol 108 (90 Base) MCG/ACT inhaler   Commonly known as:  PROVENTIL HFA;VENTOLIN HFA        Inhale 2 puffs Every 4 (Four) Hours As Needed for Wheezing.         predniSONE 20 MG tablet   Commonly known as:  DELTASONE   Start taking on:  12/26/2017        Take 1 tablet by mouth Daily With Breakfast.           CONTINUE taking these medications       Instructions Last Dose Given Next Dose Due    acetaminophen 325 MG tablet   Commonly known as:  TYLENOL        Take 2 tablets by mouth Every 6 (Six) Hours As Needed for Mild Pain .         aspirin 81 MG chewable tablet              atorvastatin 10 MG tablet   Commonly known as:  LIPITOR              COMBIGAN 0.2-0.5 % ophthalmic solution   Generic drug:  brimonidine-timolol              docusate sodium 100 MG capsule   Commonly known as:  COLACE              pantoprazole 40 MG EC tablet   Commonly known as:  PROTONIX        take 1 tablet by mouth once daily         tamsulosin 0.4 MG capsule 24 hr capsule   Commonly known as:  FLOMAX              Tiotropium Bromide Monohydrate 2.5 MCG/ACT aerosol solution   Commonly known as:  SPIRIVA RESPIMAT        Inhale 1 inhaler Daily.         VITEYES AREDS FORMULA                    Where to Get Your Medications      These medications were sent to [place] [phone]  - [phone] [address]     Phone:  [phone]    • albuterol 108 (90 Base) MCG/ACT inhaler   • predniSONE 20 MG tablet            Future Appointments  Date Time Provider Department Center   5[facility]30/2018 11:[facility]     Follow-up Information     Follow up with Hilton Chavarria MD Follow up in 2 week(s).    Specialty:  Pulmonary Disease    Contact information:    [address] [facility] [address]  [place] [id number] [phone]          Follow up with Adam Feliz MD .    Specialty:  Internal Medicine     Contact information:    [address] [facility] [address] [phone]            TEST  RESULTS PENDING AT DISCHARGE  None   Order Current Status    Blood Culture - Blood, Preliminary result    Blood Culture - Blood, Preliminary result           CODE STATUS  Full Code        Shamir Daly MD  [facility]  12/25/17  1:22 PM      Time: greater than 30 minutes.

## 2017-12-25 NOTE — H&P
[facility] Admission H&P    Patient Care Team:  Adam Feliz MD as PCP - General (Internal Medicine)    Chief complaint: Nonproductive cough, congestion    History of Present Illness    This is an 85-year-old male with history of COPD, asbestosis, chronic kidney disease stage III who presented to the emergency room with complaints of nonproductive cough, chest congestion, mildly worsened shortness of breath, and generally not feeling well for what he says has been the last 2-3 days.  Family at the bedside state they have noticed his symptoms for closer to 5 or 6 days.  His symptoms were not improving and thus he decided to come to the emergency room today.  He also noted having 2 episodes of loose stool this morning but nothing since.  He denies any nausea or vomiting.  No abdominal pain.  No fevers or chills.  He has had a little bit of a sore throat.  Influenza testing in the emergency room was positive.  Back in October of this year he was admitted for a spontaneous pneumothorax.  Since that time he states he has been doing well from a respiratory standpoint.    Past Medical History:   Diagnosis Date   • Anemia    • Arthritis    • Cancer     skin   • Colon polyp    • History of transfusion    • Hyperlipidemia    • Macular degeneration      Past Surgical History:   Procedure Laterality Date   • APPENDECTOMY  1958   • COLONOSCOPY  2013   • ENDOSCOPY N/A 10/5/2016    Procedure: ESOPHAGOGASTRODUODENOSCOPY with biopsy;  Surgeon: Edward Gan MD;  Location: [facility] ENDOSCOPY;  Service:    • SKIN BIOPSY       History reviewed. No pertinent family history.  Social History   Substance Use Topics   • Smoking status: Former Smoker     Quit date: 2010   • Smokeless tobacco: Never Used   • Alcohol use Yes      Comment: daily       (Not in a hospital admission)  Allergies:  Polymyxin b-trimethoprim and Sulfa antibiotics    Review of Systems   Constitutional: Positive for fatigue. Negative for chills and fever.   HENT:  Negative for congestion and sore throat.    Eyes: Negative for visual disturbance.   Respiratory: Positive for cough and shortness of breath. Negative for chest tightness and wheezing.    Cardiovascular: Negative for chest pain, palpitations and leg swelling.   Gastrointestinal: Negative for abdominal distention, abdominal pain, diarrhea, nausea and vomiting.        Loose stools ×2 this morning   Endocrine: Negative for polydipsia and polyuria.   Genitourinary: Negative for difficulty urinating, dysuria, frequency and urgency.   Musculoskeletal: Negative for arthralgias and myalgias.   Skin: Negative for color change and rash.   Neurological: Negative for dizziness and light-headedness.        PHYSICAL EXAM    Vital Signs  tMax 24 hrs:  Temp (24hrs), Av °F (37.2 °C), Min:98.4 °F (36.9 °C), Max:99.6 °F (37.6 °C)    Vitals Ranges:  Temp:  [98.4 °F (36.9 °C)-99.6 °F (37.6 °C)] 98.4 °F (36.9 °C)  Heart Rate:  [] 98  Resp:  [16-20] 20  BP: (125-145)/(68-91) 135/91    Physical Exam   Constitutional: He is oriented to person, place, and time. He appears well-developed and well-nourished. No distress.   Elderly, mildly ill-appearing   HENT:   Head: Normocephalic and atraumatic.   Eyes: EOM are normal. Pupils are equal, round, and reactive to light.   Neck: Neck supple. No tracheal deviation present.   Cardiovascular: Normal rate and regular rhythm.  Exam reveals no gallop.    No murmur heard.  Pulmonary/Chest: Effort normal. No respiratory distress. He has no wheezes.   Diminished breath sounds throughout   Abdominal: Soft. Bowel sounds are normal. He exhibits no distension. There is no tenderness.   Musculoskeletal: He exhibits no edema or tenderness.   Neurological: He is alert and oriented to person, place, and time. No cranial nerve deficit.   Skin: Skin is warm and dry.   Nursing note and vitals reviewed.      Results Review:    Results from last 7 days  Lab Units 17  1523   WBC 10*3/mm3 17.85*    HEMOGLOBIN g/dL 12.6*   HEMATOCRIT % 38.9*   PLATELETS 10*3/mm3 145       Results from last 7 days  Lab Units 12/24/17  1523   SODIUM mmol/L 139   POTASSIUM mmol/L 4.8   CHLORIDE mmol/L 96*   CO2 mmol/L 28.7   BUN mg/dL 27*   CREATININE mg/dL 1.66*   CALCIUM mg/dL 8.8   BILIRUBIN mg/dL 0.3   ALK PHOS U/L 116   ALT (SGPT) U/L 18   AST (SGOT) U/L 32   GLUCOSE mg/dL 106*     Influenza B positive  Lactic acid 1.3  Troponin less than 0.01  Pro-calcitonin 0.93    Chest CT:  1. Emphysema/COPD with chronic-appearing parenchymal changes, no active  airspace disease.  2. Stable subcentimeter left upper lobe nodule, suggest 6-12 month  follow-up with CT    Chest x-ray:  Stable chest, no acute cardiovascular or pulmonary process  identified.    EKG shows sinus rhythm with no acute ST or T-wave changes     I reviewed the patient's new clinical results.  I reviewed the patient's new imaging results and agree with the interpretation.  I personally viewed and interpreted the patient's EKG/Telemetry data      Principal Problem:    Acute exacerbation of chronic obstructive pulmonary disease (COPD)  Active Problems:    BON (acute kidney injury)    Influenza B    HTN (hypertension)    Lung nodule    CKD (chronic kidney disease) stage 3, GFR 30-59 ml/min      Assessment & Plan    The patient will be admitted.  Continue bronchodilators.  Start prednisone now that he has received a dose of IV Solu-Medrol.  His symptoms have been ongoing for more than 48 hours and I do not think that Tamiflu would be all that beneficial at this point.  Supportive care with gentle IV fluids and recheck kidney function in the morning.  Creatinine is slightly above his baseline of 1.1.  Hopefully with these measures the patient will feel better by tomorrow and can be discharged.  His white blood cell count and pro-calcitonin were elevated.  These will be rechecked in the morning.  I am going to hold off on antibiotics at this time given the positive  influenza test and lack of pneumonia on CT scan.  Blood cultures have been ordered.  Additional plans based on his clinical course.    I discussed the patients findings and my recommendations with patient and family    Edward Griffith MD  12/24/17  9:13 PM

## 2017-12-26 NOTE — PROGRESS NOTES
Case Management Discharge Note    Final Note: Orders reviewed.  Pt dicharged home, no known needs. LEN Collazo RN    Discharge Placement     No information found

## 2017-12-29 LAB
BACTERIA SPEC AEROBE CULT: NORMAL
BACTERIA SPEC AEROBE CULT: NORMAL

## 2018-03-04 ENCOUNTER — APPOINTMENT (OUTPATIENT)
Dept: GENERAL RADIOLOGY | Facility: HOSPITAL | Age: 83
End: 2018-03-04

## 2018-03-04 ENCOUNTER — HOSPITAL ENCOUNTER (INPATIENT)
Facility: HOSPITAL | Age: 83
LOS: 6 days | Discharge: HOME | End: 2018-03-10
Attending: EMERGENCY MEDICINE | Admitting: INTERNAL MEDICINE

## 2018-03-04 DIAGNOSIS — J44.1 CHRONIC OBSTRUCTIVE PULMONARY DISEASE WITH ACUTE EXACERBATION (HCC): ICD-10-CM

## 2018-03-04 DIAGNOSIS — J44.1 COPD EXACERBATION (HCC): Primary | ICD-10-CM

## 2018-03-04 DIAGNOSIS — J93.11 PRIMARY SPONTANEOUS PNEUMOTHORAX: ICD-10-CM

## 2018-03-04 DIAGNOSIS — K59.09 OTHER CONSTIPATION: ICD-10-CM

## 2018-03-04 LAB
ALBUMIN SERPL-MCNC: 4.3 G/DL (ref 3.5–5.2)
ALBUMIN/GLOB SERPL: 1.2 G/DL
ALP SERPL-CCNC: 111 U/L (ref 39–117)
ALT SERPL W P-5'-P-CCNC: 13 U/L (ref 1–41)
ANION GAP SERPL CALCULATED.3IONS-SCNC: 13.7 MMOL/L
AST SERPL-CCNC: 26 U/L (ref 1–40)
BASOPHILS # BLD AUTO: 0.03 10*3/MM3 (ref 0–0.2)
BASOPHILS NFR BLD AUTO: 0.3 % (ref 0–1.5)
BILIRUB SERPL-MCNC: 0.3 MG/DL (ref 0.1–1.2)
BUN BLD-MCNC: 18 MG/DL (ref 8–23)
BUN/CREAT SERPL: 11.3 (ref 7–25)
CALCIUM SPEC-SCNC: 9.3 MG/DL (ref 8.6–10.5)
CHLORIDE SERPL-SCNC: 99 MMOL/L (ref 98–107)
CO2 SERPL-SCNC: 25.3 MMOL/L (ref 22–29)
CREAT BLD-MCNC: 1.59 MG/DL (ref 0.76–1.27)
D DIMER PPP FEU-MCNC: 0.51 MCGFEU/ML (ref 0–0.49)
DEPRECATED RDW RBC AUTO: 50.4 FL (ref 37–54)
EOSINOPHIL # BLD AUTO: 0.28 10*3/MM3 (ref 0–0.7)
EOSINOPHIL NFR BLD AUTO: 3.2 % (ref 0.3–6.2)
ERYTHROCYTE [DISTWIDTH] IN BLOOD BY AUTOMATED COUNT: 14.2 % (ref 11.5–14.5)
GFR SERPL CREATININE-BSD FRML MDRD: 42 ML/MIN/1.73
GLOBULIN UR ELPH-MCNC: 3.7 GM/DL
GLUCOSE BLD-MCNC: 102 MG/DL (ref 65–99)
GLUCOSE BLDC GLUCOMTR-MCNC: 92 MG/DL (ref 70–130)
HCT VFR BLD AUTO: 38 % (ref 40.4–52.2)
HGB BLD-MCNC: 12.3 G/DL (ref 13.7–17.6)
HOLD SPECIMEN: NORMAL
HOLD SPECIMEN: NORMAL
IMM GRANULOCYTES # BLD: 0.02 10*3/MM3 (ref 0–0.03)
IMM GRANULOCYTES NFR BLD: 0.2 % (ref 0–0.5)
INR PPP: 1.01 (ref 0.9–1.1)
LYMPHOCYTES # BLD AUTO: 1.97 10*3/MM3 (ref 0.9–4.8)
LYMPHOCYTES NFR BLD AUTO: 22.7 % (ref 19.6–45.3)
MCH RBC QN AUTO: 31.2 PG (ref 27–32.7)
MCHC RBC AUTO-ENTMCNC: 32.4 G/DL (ref 32.6–36.4)
MCV RBC AUTO: 96.4 FL (ref 79.8–96.2)
MONOCYTES # BLD AUTO: 1.07 10*3/MM3 (ref 0.2–1.2)
MONOCYTES NFR BLD AUTO: 12.3 % (ref 5–12)
NEUTROPHILS # BLD AUTO: 5.31 10*3/MM3 (ref 1.9–8.1)
NEUTROPHILS NFR BLD AUTO: 61.3 % (ref 42.7–76)
NT-PROBNP SERPL-MCNC: 238.5 PG/ML (ref 0–1800)
PLATELET # BLD AUTO: 212 10*3/MM3 (ref 140–500)
PMV BLD AUTO: 12.5 FL (ref 6–12)
POTASSIUM BLD-SCNC: 5.1 MMOL/L (ref 3.5–5.2)
PROT SERPL-MCNC: 8 G/DL (ref 6–8.5)
PROTHROMBIN TIME: 13.1 SECONDS (ref 11.7–14.2)
RBC # BLD AUTO: 3.94 10*6/MM3 (ref 4.6–6)
SODIUM BLD-SCNC: 138 MMOL/L (ref 136–145)
TROPONIN T SERPL-MCNC: <0.01 NG/ML (ref 0–0.03)
WBC NRBC COR # BLD: 8.68 10*3/MM3 (ref 4.5–10.7)
WHOLE BLOOD HOLD SPECIMEN: NORMAL
WHOLE BLOOD HOLD SPECIMEN: NORMAL

## 2018-03-04 PROCEDURE — 84484 ASSAY OF TROPONIN QUANT: CPT | Performed by: EMERGENCY MEDICINE

## 2018-03-04 PROCEDURE — 25010000002 METHYLPREDNISOLONE PER 125 MG: Performed by: EMERGENCY MEDICINE

## 2018-03-04 PROCEDURE — 85025 COMPLETE CBC W/AUTO DIFF WBC: CPT | Performed by: EMERGENCY MEDICINE

## 2018-03-04 PROCEDURE — 94640 AIRWAY INHALATION TREATMENT: CPT

## 2018-03-04 PROCEDURE — 71046 X-RAY EXAM CHEST 2 VIEWS: CPT

## 2018-03-04 PROCEDURE — 93010 ELECTROCARDIOGRAM REPORT: CPT | Performed by: INTERNAL MEDICINE

## 2018-03-04 PROCEDURE — 82962 GLUCOSE BLOOD TEST: CPT

## 2018-03-04 PROCEDURE — 93005 ELECTROCARDIOGRAM TRACING: CPT | Performed by: EMERGENCY MEDICINE

## 2018-03-04 PROCEDURE — 85379 FIBRIN DEGRADATION QUANT: CPT | Performed by: EMERGENCY MEDICINE

## 2018-03-04 PROCEDURE — 83880 ASSAY OF NATRIURETIC PEPTIDE: CPT | Performed by: EMERGENCY MEDICINE

## 2018-03-04 PROCEDURE — 94799 UNLISTED PULMONARY SVC/PX: CPT

## 2018-03-04 PROCEDURE — 80053 COMPREHEN METABOLIC PANEL: CPT | Performed by: EMERGENCY MEDICINE

## 2018-03-04 PROCEDURE — 85610 PROTHROMBIN TIME: CPT | Performed by: EMERGENCY MEDICINE

## 2018-03-04 PROCEDURE — 99285 EMERGENCY DEPT VISIT HI MDM: CPT

## 2018-03-04 RX ORDER — SODIUM CHLORIDE 9 MG/ML
100 INJECTION, SOLUTION INTRAVENOUS CONTINUOUS
Status: DISCONTINUED | OUTPATIENT
Start: 2018-03-04 | End: 2018-03-08

## 2018-03-04 RX ORDER — AZITHROMYCIN 250 MG/1
500 TABLET, FILM COATED ORAL
Status: COMPLETED | OUTPATIENT
Start: 2018-03-05 | End: 2018-03-07

## 2018-03-04 RX ORDER — DOCUSATE SODIUM 100 MG/1
100 CAPSULE, LIQUID FILLED ORAL DAILY
Status: DISCONTINUED | OUTPATIENT
Start: 2018-03-05 | End: 2018-03-10 | Stop reason: HOSPADM

## 2018-03-04 RX ORDER — SODIUM CHLORIDE 0.9 % (FLUSH) 0.9 %
1-10 SYRINGE (ML) INJECTION AS NEEDED
Status: DISCONTINUED | OUTPATIENT
Start: 2018-03-04 | End: 2018-03-10 | Stop reason: HOSPADM

## 2018-03-04 RX ORDER — ONDANSETRON 4 MG/1
4 TABLET, FILM COATED ORAL EVERY 6 HOURS PRN
Status: DISCONTINUED | OUTPATIENT
Start: 2018-03-04 | End: 2018-03-10 | Stop reason: HOSPADM

## 2018-03-04 RX ORDER — DEXTROSE MONOHYDRATE 25 G/50ML
25 INJECTION, SOLUTION INTRAVENOUS
Status: DISCONTINUED | OUTPATIENT
Start: 2018-03-04 | End: 2018-03-10 | Stop reason: HOSPADM

## 2018-03-04 RX ORDER — TAMSULOSIN HYDROCHLORIDE 0.4 MG/1
0.4 CAPSULE ORAL DAILY
Status: DISCONTINUED | OUTPATIENT
Start: 2018-03-05 | End: 2018-03-10 | Stop reason: HOSPADM

## 2018-03-04 RX ORDER — SODIUM CHLORIDE 0.9 % (FLUSH) 0.9 %
10 SYRINGE (ML) INJECTION AS NEEDED
Status: DISCONTINUED | OUTPATIENT
Start: 2018-03-04 | End: 2018-03-10 | Stop reason: HOSPADM

## 2018-03-04 RX ORDER — METHYLPREDNISOLONE SODIUM SUCCINATE 125 MG/2ML
125 INJECTION, POWDER, LYOPHILIZED, FOR SOLUTION INTRAMUSCULAR; INTRAVENOUS ONCE
Status: COMPLETED | OUTPATIENT
Start: 2018-03-04 | End: 2018-03-04

## 2018-03-04 RX ORDER — NICOTINE POLACRILEX 4 MG
15 LOZENGE BUCCAL
Status: DISCONTINUED | OUTPATIENT
Start: 2018-03-04 | End: 2018-03-10 | Stop reason: HOSPADM

## 2018-03-04 RX ORDER — BRIMONIDINE TARTRATE AND TIMOLOL MALEATE 2; 5 MG/ML; MG/ML
1 SOLUTION OPHTHALMIC EVERY 12 HOURS
Status: DISCONTINUED | OUTPATIENT
Start: 2018-03-04 | End: 2018-03-10 | Stop reason: HOSPADM

## 2018-03-04 RX ORDER — ONDANSETRON 4 MG/1
4 TABLET, ORALLY DISINTEGRATING ORAL EVERY 6 HOURS PRN
Status: DISCONTINUED | OUTPATIENT
Start: 2018-03-04 | End: 2018-03-10 | Stop reason: HOSPADM

## 2018-03-04 RX ORDER — NITROGLYCERIN 0.4 MG/1
0.4 TABLET SUBLINGUAL
Status: DISCONTINUED | OUTPATIENT
Start: 2018-03-04 | End: 2018-03-10 | Stop reason: HOSPADM

## 2018-03-04 RX ORDER — METHYLPREDNISOLONE SODIUM SUCCINATE 40 MG/ML
40 INJECTION, POWDER, LYOPHILIZED, FOR SOLUTION INTRAMUSCULAR; INTRAVENOUS EVERY 12 HOURS
Status: DISCONTINUED | OUTPATIENT
Start: 2018-03-04 | End: 2018-03-09

## 2018-03-04 RX ORDER — ATORVASTATIN CALCIUM 10 MG/1
10 TABLET, FILM COATED ORAL DAILY
Status: DISCONTINUED | OUTPATIENT
Start: 2018-03-05 | End: 2018-03-10 | Stop reason: HOSPADM

## 2018-03-04 RX ORDER — ASPIRIN 81 MG/1
81 TABLET, CHEWABLE ORAL DAILY
Status: DISCONTINUED | OUTPATIENT
Start: 2018-03-05 | End: 2018-03-10 | Stop reason: HOSPADM

## 2018-03-04 RX ORDER — ONDANSETRON 2 MG/ML
4 INJECTION INTRAMUSCULAR; INTRAVENOUS EVERY 6 HOURS PRN
Status: DISCONTINUED | OUTPATIENT
Start: 2018-03-04 | End: 2018-03-10 | Stop reason: HOSPADM

## 2018-03-04 RX ORDER — PANTOPRAZOLE SODIUM 40 MG/1
40 TABLET, DELAYED RELEASE ORAL
Status: DISCONTINUED | OUTPATIENT
Start: 2018-03-05 | End: 2018-03-10 | Stop reason: HOSPADM

## 2018-03-04 RX ORDER — IPRATROPIUM BROMIDE AND ALBUTEROL SULFATE 2.5; .5 MG/3ML; MG/3ML
3 SOLUTION RESPIRATORY (INHALATION) ONCE
Status: COMPLETED | OUTPATIENT
Start: 2018-03-04 | End: 2018-03-04

## 2018-03-04 RX ADMIN — METHYLPREDNISOLONE SODIUM SUCCINATE 125 MG: 125 INJECTION, POWDER, FOR SOLUTION INTRAMUSCULAR; INTRAVENOUS at 20:26

## 2018-03-04 RX ADMIN — IPRATROPIUM BROMIDE AND ALBUTEROL SULFATE 3 ML: .5; 3 SOLUTION RESPIRATORY (INHALATION) at 20:19

## 2018-03-04 RX ADMIN — SODIUM CHLORIDE 100 ML/HR: 9 INJECTION, SOLUTION INTRAVENOUS at 22:41

## 2018-03-04 NOTE — ED PROVIDER NOTES
EMERGENCY DEPARTMENT ENCOUNTER    CHIEF COMPLAINT  Chief Complaint: SOA   History given by: pt, family   History limited by: none   Room Number: 03/03  PMD: Adam Feliz MD      HPI:  Pt is a 85 y.o. male who presents complaining of SOA starting suddenly at 1600 today. Pt denies cough, CP, fever, HA, sore throat, abd pain, diarrhea, bloody or black stool, dysuria, leg pain or swelling, or rash. Pt states a Hx of spontaneous pneumothorax, and states that his SOA today feels similar. Per the pt, he is not normally on O2. Per the pt's daughter, the pt was admitted here for flu in lat December of last year.     Duration/Onset/Timing: began suddenly at 1600 today   Quality: SOA   Intensity/Severity: moderate   Associated Symptoms: none   Aggravating or Alleviating Factors: none stated   Previous Episodes: pt reports similar SOA with prior spontaneous pneumothorax.       PAST MEDICAL HISTORY  Active Ambulatory Problems     Diagnosis Date Noted   • Gastric outlet obstruction 10/01/2016   • Primary spontaneous pneumothorax 10/03/2017   • COPD (chronic obstructive pulmonary disease) 10/03/2017   • Acute exacerbation of chronic obstructive pulmonary disease (COPD) 12/24/2017   • Influenza B 12/24/2017   • HTN (hypertension) 12/24/2017   • Lung nodule 12/24/2017   • CKD (chronic kidney disease) stage 3, GFR 30-59 ml/min 12/24/2017     Resolved Ambulatory Problems     Diagnosis Date Noted   • BON (acute kidney injury) 10/05/2016   • Peptic ulcer disease 10/06/2016     Past Medical History:   Diagnosis Date   • Anemia    • Arthritis    • Cancer    • Colon polyp    • History of transfusion    • Hyperlipidemia    • Macular degeneration        PAST SURGICAL HISTORY  Past Surgical History:   Procedure Laterality Date   • APPENDECTOMY  1958   • COLONOSCOPY  2013   • ENDOSCOPY N/A 10/5/2016    Procedure: ESOPHAGOGASTRODUODENOSCOPY with biopsy;  Surgeon: Edward Gan MD;  Location: [facility] ENDOSCOPY;  Service:    • EYE  SURGERY      cataract b/l surgery   • SKIN BIOPSY         FAMILY HISTORY  Family History   Problem Relation Age of Onset   • No Known Problems Mother    • No Known Problems Father        SOCIAL HISTORY  Social History     Social History   • Marital status:      Spouse name: N/A   • Number of children: N/A   • Years of education: N/A     Occupational History   • Not on file.     Social History Main Topics   • Smoking status: Former Smoker     Packs/day: 1.50     Years: 10.00     Types: Cigarettes     Start date: 12/24/2000     Quit date: 2010   • Smokeless tobacco: Never Used   • Alcohol use 12.6 oz/week     21 Cans of beer per week      Comment: daily   • Drug use: No   • Sexual activity: Defer     Other Topics Concern   • Not on file     Social History Narrative   • No narrative on file       ALLERGIES  Polymyxin b-trimethoprim and Sulfa antibiotics    REVIEW OF SYSTEMS  Review of Systems   Constitutional: Negative.  Negative for chills and fever.   HENT: Negative.  Negative for sore throat.    Eyes: Negative.    Respiratory: Positive for shortness of breath. Negative for cough.    Cardiovascular: Negative.  Negative for chest pain.   Gastrointestinal: Negative.    Genitourinary: Negative.  Negative for dysuria.   Musculoskeletal: Negative.  Negative for back pain.   Skin: Negative.  Negative for rash.   Neurological: Negative.  Negative for headaches.       PHYSICAL EXAM  ED Triage Vitals   Temp Heart Rate Resp BP SpO2   03/04/18 1736 03/04/18 1736 03/04/18 1736 03/04/18 1748 03/04/18 1736   97.4 °F (36.3 °C) 120 18 193/107 94 %      Temp src Heart Rate Source Patient Position BP Location FiO2 (%)   03/04/18 1736 03/04/18 1736 03/04/18 1748 03/04/18 1748 --   Tympanic Monitor Sitting Right arm        Physical Exam   Constitutional: No distress.   HENT:   Head: Normocephalic and atraumatic.   Mouth/Throat: Oropharynx is clear and moist.   Eyes:   Unremarkable   Cardiovascular: Regular rhythm.  Tachycardia  present.    Pulmonary/Chest: No respiratory distress. He has decreased breath sounds (bilaterally).   Saturation of 99% on 2L   Abdominal: There is no tenderness.   Musculoskeletal: He exhibits no edema or tenderness.   Neurological: He is alert.   Skin: No rash noted.   Nursing note and vitals reviewed.      LAB RESULTS  Lab Results (last 24 hours)     Procedure Component Value Units Date/Time    CBC & Differential [[id number]] Collected:  03/04/18 1744    Specimen:  Blood Updated:  03/04/18 1911    Narrative:       The following orders were created for panel order CBC & Differential.  Procedure                               Abnormality         Status                     ---------                               -----------         ------                     CBC Auto Differential[[id number]]        Abnormal            Final result                 Please view results for these tests on the individual orders.    Comprehensive Metabolic Panel [[id number]]  (Abnormal) Collected:  03/04/18 1744    Specimen:  Blood Updated:  03/04/18 1923     Glucose 102 (H) mg/dL      BUN 18 mg/dL      Creatinine 1.59 (H) mg/dL      Sodium 138 mmol/L      Potassium 5.1 mmol/L      Chloride 99 mmol/L      CO2 25.3 mmol/L      Calcium 9.3 mg/dL      Total Protein 8.0 g/dL      Albumin 4.30 g/dL      ALT (SGPT) 13 U/L      AST (SGOT) 26 U/L      Alkaline Phosphatase 111 U/L      Total Bilirubin 0.3 mg/dL      eGFR Non African Amer 42 (L) mL/min/1.73      Globulin 3.7 gm/dL      A/G Ratio 1.2 g/dL      BUN/Creatinine Ratio 11.3     Anion Gap 13.7 mmol/L     Narrative:       The MDRD GFR formula is only valid for adults with stable renal function between ages 18 and 70.    Protime-INR [[id number]]  (Normal) Collected:  03/04/18 1744    Specimen:  Blood Updated:  03/04/18 1918     Protime 13.1 Seconds      INR 1.01    BNP [[id number]]  (Normal) Collected:  03/04/18 1744    Specimen:  Blood Updated:  03/04/18 1921     proBNP 238.5 pg/mL      Narrative:       Among patients with dyspnea, NT-proBNP is highly sensitive for the detection of acute congestive heart failure. In addition NT-proBNP of <300 pg/ml effectively rules out acute congestive heart failure with 99% negative predictive value.    Troponin [[id number]]  (Normal) Collected:  03/04/18 1744    Specimen:  Blood Updated:  03/04/18 1923     Troponin T <0.010 ng/mL     Narrative:       Troponin T Reference Ranges:  Less than 0.03 ng/mL:    Negative for AMI  0.03 to 0.09 ng/mL:      Indeterminant for AMI  Greater than 0.09 ng/mL: Positive for AMI    D-dimer, Quantitative [[id number]]  (Abnormal) Collected:  03/04/18 1744    Specimen:  Blood Updated:  03/04/18 1918     D-Dimer, Quantitative 0.51 (H) MCGFEU/mL     Narrative:       The Stago D-Dimer test used in conjunction with a clinical pretest probability (PTP) assessment model, has been approved by the FDA to rule out the presence of venous thromboembolism (VTE) in outpatients suspected of deep venous thrombosis (DVT) or pulmonary embolism (PE).     CBC Auto Differential [[id number]]  (Abnormal) Collected:  03/04/18 1744    Specimen:  Blood Updated:  03/04/18 1911     WBC 8.68 10*3/mm3      RBC 3.94 (L) 10*6/mm3      Hemoglobin 12.3 (L) g/dL      Hematocrit 38.0 (L) %      MCV 96.4 (H) fL      MCH 31.2 pg      MCHC 32.4 (L) g/dL      RDW 14.2 %      RDW-SD 50.4 fl      MPV 12.5 (H) fL      Platelets 212 10*3/mm3      Neutrophil % 61.3 %      Lymphocyte % 22.7 %      Monocyte % 12.3 (H) %      Eosinophil % 3.2 %      Basophil % 0.3 %      Immature Grans % 0.2 %      Neutrophils, Absolute 5.31 10*3/mm3      Lymphocytes, Absolute 1.97 10*3/mm3      Monocytes, Absolute 1.07 10*3/mm3      Eosinophils, Absolute 0.28 10*3/mm3      Basophils, Absolute 0.03 10*3/mm3      Immature Grans, Absolute 0.02 10*3/mm3           I ordered the above labs and reviewed the results    RADIOLOGY  XR Chest 2 View          FINDINGS: 2 views of the chest demonstrate the heart  to be within normal  limits in size. The diaphragm is flattened and AP diameter increased  consistent with COPD. Pleural calcification is noted limiting evaluation  in terms of evaluation for pulmonary nodules but no convincing pulmonary  nodules are identified. The nodule noted on the CT examination of  12/24/2017 is not able to be appreciated. This can be further assessed  with a CT examination of the chest. There is no evidence of focal  infiltrate, effusion or congestive failure.    I ordered the above noted radiological studies. Interpreted by radiologist. Reviewed by me in PACS.       PROCEDURES  Procedures  EKG           EKG time: 1943  Rhythm/Rate: sinus tachycardia 116  P waves and GA: normal   QRS, axis: normal    ST and T waves: normal      Interpreted Contemporaneously by me, independently viewed  changed compared to prior 12/2017  Rate is faster      PROGRESS AND CONSULTS  ED Course   1903  Ordered labs, CXR< and EKG for further evaluation.   2003  Ordered duo-neb and solu-medrol for SOA, and consult to pulmonology.   2006  Rechecked pt, who is resting comfortably. Discussed the pt's CXR with no findings of pneumothorax, but impression of COPD exacerbation. Plan to admit the pt. Pt understands and agrees with the plan, and all questions were answered.   2013  Received a call from Dr. Bledsoe and discussed pt's case. Dr. Bledsoe agreed to admit the pt to telemetry.      MEDICAL DECISION MAKING  Results were reviewed/discussed with the patient and they were also made aware of online access. Pt also made aware that some labs, such as cultures, will not be resulted during ER visit and follow up with PMD is necessary.     MDM  Number of Diagnoses or Management Options  COPD exacerbation:      Amount and/or Complexity of Data Reviewed  Clinical lab tests: reviewed and ordered (Creatinine 1.59, INR 1.01, .5, Troponin <0.010, D-dimer 0.51, WBC 8.68)  Tests in the radiology section of CPT®: reviewed and ordered  (CXR: COPD changes)  Tests in the medicine section of CPT®: reviewed and ordered (See procedures section for EKG)  Decide to obtain previous medical records or to obtain history from someone other than the patient: yes (Pt's records in EPIC)  Obtain history from someone other than the patient: yes (Pt's family at bedside)  Review and summarize past medical records: yes (Reviewed prior admission for COPD and pneumothorax)  Discuss the patient with other providers: yes (Dr. Bledsoe)    Patient Progress  Patient progress: stable         DIAGNOSIS  Final diagnoses:   COPD exacerbation       DISPOSITION  ADMISSION by Dr. Bledsoe    Discussed treatment plan and reason for admission with pt/family and admitting physician.  Pt/family voiced understanding of the plan for admission for further testing/treatment as needed.     Latest Documented Vital Signs:  As of 8:26 PM  BP- (!) 145/105 HR- 113 Temp- 97.4 °F (36.3 °C) (Tympanic) O2 sat- 100%    --  Documentation assistance provided by arabella Pedroza for Dr. Fry.  Information recorded by the scribe was done at my direction and has been verified and validated by me.       Dinesh Pedroza  03/04/18 2027       Shamir Fry MD  03/04/18 2028

## 2018-03-05 ENCOUNTER — APPOINTMENT (OUTPATIENT)
Dept: GENERAL RADIOLOGY | Facility: HOSPITAL | Age: 83
End: 2018-03-05
Attending: INTERNAL MEDICINE

## 2018-03-05 LAB
ANION GAP SERPL CALCULATED.3IONS-SCNC: 13.5 MMOL/L
BUN BLD-MCNC: 18 MG/DL (ref 8–23)
BUN/CREAT SERPL: 15.1 (ref 7–25)
CALCIUM SPEC-SCNC: 8.9 MG/DL (ref 8.6–10.5)
CHLORIDE SERPL-SCNC: 102 MMOL/L (ref 98–107)
CO2 SERPL-SCNC: 22.5 MMOL/L (ref 22–29)
CREAT BLD-MCNC: 1.19 MG/DL (ref 0.76–1.27)
GFR SERPL CREATININE-BSD FRML MDRD: 58 ML/MIN/1.73
GLUCOSE BLD-MCNC: 123 MG/DL (ref 65–99)
GLUCOSE BLDC GLUCOMTR-MCNC: 126 MG/DL (ref 70–130)
GLUCOSE BLDC GLUCOMTR-MCNC: 127 MG/DL (ref 70–130)
GLUCOSE BLDC GLUCOMTR-MCNC: 127 MG/DL (ref 70–130)
GLUCOSE BLDC GLUCOMTR-MCNC: 136 MG/DL (ref 70–130)
POTASSIUM BLD-SCNC: 4.7 MMOL/L (ref 3.5–5.2)
SODIUM BLD-SCNC: 138 MMOL/L (ref 136–145)

## 2018-03-05 PROCEDURE — 80048 BASIC METABOLIC PNL TOTAL CA: CPT | Performed by: INTERNAL MEDICINE

## 2018-03-05 PROCEDURE — 71046 X-RAY EXAM CHEST 2 VIEWS: CPT

## 2018-03-05 PROCEDURE — 25010000002 METHYLPREDNISOLONE PER 40 MG: Performed by: INTERNAL MEDICINE

## 2018-03-05 PROCEDURE — 94799 UNLISTED PULMONARY SVC/PX: CPT

## 2018-03-05 PROCEDURE — 82962 GLUCOSE BLOOD TEST: CPT

## 2018-03-05 RX ORDER — DOXYCYCLINE HYCLATE 50 MG/1
50 CAPSULE ORAL 2 TIMES DAILY
COMMUNITY
End: 2018-03-10 | Stop reason: HOSPADM

## 2018-03-05 RX ORDER — IPRATROPIUM BROMIDE AND ALBUTEROL SULFATE 2.5; .5 MG/3ML; MG/3ML
3 SOLUTION RESPIRATORY (INHALATION) EVERY 4 HOURS PRN
Status: DISCONTINUED | OUTPATIENT
Start: 2018-03-05 | End: 2018-03-10 | Stop reason: HOSPADM

## 2018-03-05 RX ADMIN — IPRATROPIUM BROMIDE AND ALBUTEROL SULFATE 3 ML: .5; 3 SOLUTION RESPIRATORY (INHALATION) at 18:49

## 2018-03-05 RX ADMIN — SODIUM CHLORIDE 100 ML/HR: 9 INJECTION, SOLUTION INTRAVENOUS at 20:51

## 2018-03-05 RX ADMIN — IPRATROPIUM BROMIDE AND ALBUTEROL SULFATE 3 ML: .5; 3 SOLUTION RESPIRATORY (INHALATION) at 11:48

## 2018-03-05 RX ADMIN — BRIMONIDINE TARTRATE, TIMOLOL MALEATE 1 DROP: 2; 5 SOLUTION/ DROPS OPHTHALMIC at 20:44

## 2018-03-05 RX ADMIN — METHYLPREDNISOLONE SODIUM SUCCINATE 40 MG: 40 INJECTION, POWDER, FOR SOLUTION INTRAMUSCULAR; INTRAVENOUS at 20:44

## 2018-03-05 RX ADMIN — ATORVASTATIN CALCIUM 10 MG: 10 TABLET, FILM COATED ORAL at 08:30

## 2018-03-05 RX ADMIN — PANTOPRAZOLE SODIUM 40 MG: 40 TABLET, DELAYED RELEASE ORAL at 07:29

## 2018-03-05 RX ADMIN — METHYLPREDNISOLONE SODIUM SUCCINATE 40 MG: 40 INJECTION, POWDER, FOR SOLUTION INTRAMUSCULAR; INTRAVENOUS at 08:30

## 2018-03-05 RX ADMIN — TAMSULOSIN HYDROCHLORIDE 0.4 MG: 0.4 CAPSULE ORAL at 08:30

## 2018-03-05 RX ADMIN — ASPIRIN 81 MG: 81 TABLET, CHEWABLE ORAL at 08:30

## 2018-03-05 RX ADMIN — AZITHROMYCIN 500 MG: 250 TABLET, FILM COATED ORAL at 08:30

## 2018-03-05 RX ADMIN — DOCUSATE SODIUM 100 MG: 100 CAPSULE, LIQUID FILLED ORAL at 08:30

## 2018-03-05 RX ADMIN — SODIUM CHLORIDE 100 ML/HR: 9 INJECTION, SOLUTION INTRAVENOUS at 10:10

## 2018-03-05 RX ADMIN — BRIMONIDINE TARTRATE, TIMOLOL MALEATE 1 DROP: 2; 5 SOLUTION/ DROPS OPHTHALMIC at 00:16

## 2018-03-05 RX ADMIN — BRIMONIDINE TARTRATE, TIMOLOL MALEATE 1 DROP: 2; 5 SOLUTION/ DROPS OPHTHALMIC at 09:53

## 2018-03-05 NOTE — PROGRESS NOTES
"Clinical Pharmacy Services: Medication History    Louis Villalba is a 85 y.o. male presenting to [facility] for COPD exacerbation [J44.1]    He  has a past medical history of Anemia; Arthritis; Cancer; Colon polyp; History of transfusion; Hyperlipidemia; and Macular degeneration.    Allergies as of 03/04/2018 - Juan as Reviewed 03/04/2018   Allergen Reaction Noted   • Polymyxin b-trimethoprim  11/09/2016   • Sulfa antibiotics Other (See Comments) 10/01/2016       Medication information was obtained from: [facility] ([place])  Pharmacy and Phone Number: [email or web address] [address] [facility] [phone] [facility] [phone] FX    Prior to Admission Medications       Prescriptions Last Dose Informant Patient Reported? Taking?      acetaminophen (TYLENOL) 325 MG tablet  Self No Yes    Take 2 tablets by mouth Every 6 (Six) Hours As Needed for Mild Pain .    albuterol (PROVENTIL HFA;VENTOLIN HFA) 108 (90 Base) MCG/ACT inhaler  Pharmacy No Yes    Inhale 2 puffs Every 4 (Four) Hours As Needed for Wheezing.    aspirin 81 MG chewable tablet  Self Yes Yes    Chew 81 mg Daily.    atorvastatin (LIPITOR) 10 MG tablet  Pharmacy Yes Yes    Take 10 mg by mouth Daily.    brimonidine-timolol (COMBIGAN) 0.2-0.5 % ophthalmic solution  Pharmacy Yes Yes    Administer 1 drop to both eyes Every 12 (Twelve) Hours.    Multiple Vitamins-Minerals (VITEYES AREDS FORMULA PO)  Self Yes Yes    Take 2 tablets by mouth Daily.    pantoprazole (PROTONIX) 40 MG EC tablet   No Yes    take 1 tablet by mouth once daily    tamsulosin (FLOMAX) 0.4 MG capsule 24 hr capsule   Yes Yes    Take 1 capsule by mouth Daily.    doxycycline (VIBRAMYCIN) 50 MG capsule  Pharmacy Yes Yes    Take 50 mg by mouth 2 (Two) Times a Day.    Tiotropium Bromide Monohydrate (SPIRIVA RESPIMAT) 2.5 MCG/ACT aerosol solution  Pharmacy Yes Yes    Inhale 5 mg Daily.                Medication notes: The above medications with \"pharmacy\" " informant have been verified with [facility]. The following changes have been made since the initial reconciliation:    1) Changed spiriva sig to reflect the correct dose of 5 mg QD.  2) Changed doxycycline dose to reflect 50 mg dose BID.  3) Removed prednisone from patients list.  4) Removed colace from patients list.    This medication list is complete to the best of my knowledge as of 3/5/2018    Please call if questions.    Diogenes Pineda, Pharmacy intern ([id number])  3/5/2018 10:20 AM

## 2018-03-05 NOTE — PROGRESS NOTES
Discharge Planning Assessment  [facility]     Patient Name: Louis Villalba  MRN: [id number]  Today's Date: 3/5/2018    Admit Date: 3/4/2018          Discharge Needs Assessment       03/05/18 1646    Living Environment    Lives With alone    Living Arrangements other (see comments)   patio home    Transportation Available car;family or friend will provide            Discharge Plan       03/05/18 1650    Case Management/Social Work Plan    Plan Home, family to transport.    Additional Comments IMM  signed on 3-4 by patient's son , Sanya Villalba. Face sheet and PCP verified. Pharmacy added.  House is a [facility] [facility]. Two stories with 1 step to enter. Patient states he stays downstairs. , Living will on chart. Son Sanya and daughter Debbie share POA responsibilities . Daughter states either she, or her brother and sister in law will stay  with patient at  Discharge if needed  . Patient denies DME and HH . States he goes to [employer] to exercise  2 times a week.  Patient wants to go home on discharge but hasn't ambulated since admission. CCP to follow .......   AJAY Packer        Discharge Placement     No information found                Demographic Summary       03/05/18 1643    Referral Information    Admission Type inpatient   IMM 3-4    Referral Source admission list    Reason For Consult discharge planning    Primary Care Physician Information    Name Guillermo Feliz MD            Functional Status       03/05/18 1643    Functional Status Current    Ambulation 0-->independent    Transferring 0-->independent    Toileting 0-->independent    Bathing 0-->independent    Dressing 0-->independent    Eating 0-->independent    Communication 0-->understands/communicates without difficulty    Functional Status Prior    Ambulation 0-->independent    Transferring 0-->independent    Toileting 0-->independent    Bathing 0-->independent    Dressing 0-->independent    Eating 0-->independent    Communication  0-->understands/communicates without difficulty    Swallowing 0-->swallows foods/liquids without difficulty    IADL    Medications independent    Meal Preparation independent    Housekeeping independent    Laundry independent    Shopping independent    Oral Care independent    Cognitive/Perceptual/Developmental    Developmental Stage (Eriksson's Stages of Development) Stage 8 (65 years-death/Late Adulthood) Integrity vs. Despair            Psychosocial     None            Abuse/Neglect     None            Legal       03/05/18 9450    Legal    Legal Comments Living Will on chart            Substance Abuse     None            Patient Forms     None          AJAY Packer

## 2018-03-05 NOTE — PLAN OF CARE
Problem: Patient Care Overview (Adult)  Goal: Plan of Care Review  Outcome: Ongoing (interventions implemented as appropriate)   03/04/18 2327   Coping/Psychosocial Response Interventions   Plan Of Care Reviewed With patient   Patient Care Overview   Progress improving   Outcome Evaluation   Outcome Summary/Follow up Plan states much better since had breathing treatment in the ER oxygen at 2 liters sats upper 90's       Problem: COPD, Chronic Bronchitis/Emphysema (Adult)  Goal: Signs and Symptoms of Listed Potential Problems Will be Absent or Manageable (COPD, Chronic Bronchitis/Emphysema)  Outcome: Ongoing (interventions implemented as appropriate)   03/04/18 2327   COPD, Chronic Bronchitis/Emphysema   Problems Assessed (COPD, Chronic Bronchitis/Emphysema) all   Problems Present (COPD, Chronic Bronchitis/Emphysema) dyspnea;hypoxia/hypoxemia

## 2018-03-05 NOTE — PROGRESS NOTES
"                                              LOS: 1 day   Patient Care Team:  Adam Feliz MD as PCP - General (Internal Medicine)    Chief Complaint:  Follow-up on COPD, no pneumothorax and medical problems listed below    Interval History:   Shouldn't reported persistent shortness of breath which is worse on minimal exertion.  He does not have cough.  He denies wheezing.  Symptoms are stable since admission with no significant relief, though partially alleviated by the use of nebulizers..  He denies cough at home, fall or trauma to his chest     REVIEW OF SYSTEMS:   CARDIOVASCULAR: No chest pain, chest pressure or chest discomfort. No palpitations or edema.   RESPIRATORY: see above  GASTROINTESTINAL: No anorexia, nausea, vomiting or diarrhea. No abdominal pain or blood.   HEMATOLOGIC: No bleeding or bruising.     Ventilator/Non-Invasive Ventilation Settings     None            Vital Signs  Temp:  [97.4 °F (36.3 °C)-98 °F (36.7 °C)] 98 °F (36.7 °C)  Heart Rate:  [] 90  Resp:  [18-22] 18  BP: (145-193)/() 161/80    Intake/Output Summary (Last 24 hours) at 03/05/18 1434  Last data filed at 03/05/18 1200   Gross per 24 hour   Intake          1303.33 ml   Output                0 ml   Net          1303.33 ml     Flowsheet Rows         First Filed Value    Admission Height  165.1 cm (65\") Documented at 03/04/2018 1736    Admission Weight  52.2 kg (115 lb) Documented at 03/04/2018 1738          Physical Exam:   General Appearance:    Alert, cooperative, in no acute distress   Lungs:     Significantly decreased air entry throughout.  No wheezing or crackles     Heart:    Regular rhythm and normal rate, normal S1 and S2, no            murmur, no gallop, no rub, no click   Chest Wall:    No abnormalities observed   Abdomen:     Soft.  No tenderness    Neuro:   Conscious, alert, oriented x3   Extremities:   Moves all extremities well, no edema, no cyanosis, no             Redness          Results Review:  "         Results from last 7 days  Lab Units 03/05/18  0435 03/04/18  1744   SODIUM mmol/L 138 138   POTASSIUM mmol/L 4.7 5.1   CHLORIDE mmol/L 102 99   CO2 mmol/L 22.5 25.3   BUN mg/dL 18 18   CREATININE mg/dL 1.19 1.59*   GLUCOSE mg/dL 123* 102*   CALCIUM mg/dL 8.9 9.3       Results from last 7 days  Lab Units 03/04/18  1744   TROPONIN T ng/mL <0.010       Results from last 7 days  Lab Units 03/04/18  1744   WBC 10*3/mm3 8.68   HEMOGLOBIN g/dL 12.3*   HEMATOCRIT % 38.0*   PLATELETS 10*3/mm3 212       Results from last 7 days  Lab Units 03/04/18  1744   INR  1.01       Results from last 7 days  Lab Units 03/04/18  1744   PROBNP pg/mL 238.5       I reviewed the patient's new clinical results.  I personally viewed and interpreted the patient's CXR        Medication Review:     aspirin 81 mg Oral Daily   atorvastatin 10 mg Oral Daily   azithromycin 500 mg Oral Q24H   brimonidine-timolol 1 drop Both Eyes Q12H   docusate sodium 100 mg Oral Daily   insulin aspart 0-9 Units Subcutaneous 4x Daily With Meals & Nightly   methylPREDNISolone sodium succinate 40 mg Intravenous Q12H   pantoprazole 40 mg Oral Q AM   tamsulosin 0.4 mg Oral Daily         sodium chloride 100 mL/hr Last Rate: 100 mL/hr (03/05/18 1010)       Diagnostic imaging:  I personally and independently reviewed theFollowing images:  Since the last chest x-ray in December, there is interval development of left pneumothorax               Assessment/Plan   1. Shortness of breath, secondary to the problems listed below  2. Recurrent left sided pneumothorax, spontaneous.  First event occurred and October and treated with a chest tube  3. Acute exacerbation of COPD, moderate COPD with moderate emphysema at baseline  4. BON on CKD, improved  5. 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 Sinus tachycardia on EKG  7. Macular degeneration with difficulties using his inhaler      Initiate oxygen for pneumothorax and not for hypoxemia    Consult  thoracic surgery for pleurodesis.  This is the second spontaneous pneumothorax in a gentleman with severe COPD.  There is no clear bullae on the CT of the chest done on 12/24/17 but there is significant centrilobular emphysema.    Continue bronchodilators as above.  We will transition to nebulizers at home since patient has difficulties maneuvering his inhalers.    Discussed with the patient and the family at bedside      Benji Huynh MD  03/05/18  2:34 PM          This note was dictated utilizing Dragon dictation

## 2018-03-05 NOTE — PLAN OF CARE
Problem: Patient Care Overview (Adult)  Goal: Plan of Care Review  Outcome: Ongoing (interventions implemented as appropriate)   03/05/18 1438   Coping/Psychosocial Response Interventions   Plan Of Care Reviewed With patient   Patient Care Overview   Progress improving   Outcome Evaluation   Outcome Summary/Follow up Plan patient vss. patient has been on room air all morning. repeat chest x ray and duonebs ordered for patient today. will monitor.       Problem: COPD, Chronic Bronchitis/Emphysema (Adult)  Goal: Signs and Symptoms of Listed Potential Problems Will be Absent or Manageable (COPD, Chronic Bronchitis/Emphysema)  Outcome: Ongoing (interventions implemented as appropriate)   03/05/18 1438   COPD, Chronic Bronchitis/Emphysema   Problems Assessed (COPD, Chronic Bronchitis/Emphysema) all   Problems Present (COPD, Chronic Bronchitis/Emphysema) dyspnea

## 2018-03-05 NOTE — CONSULTS
"Adult Nutrition  Assessment/PES    Patient Name:  Louis Villalba  YOB: 1932  MRN: [id number]  Admit Date:  3/4/2018    Assessment Date:  3/5/2018    Comments:  Nutrition Assessment completed. Pt tolerating diet with good po intake and taking supplement. Will honor food pref's.          Reason for Assessment       03/05/18 0943    Reason for Assessment    Reason For Assessment/Visit identified at risk by screening criteria    Identified At Risk By Screening Criteria MST SCORE 2+    Diagnosis Diagnosis    Infectious Disease Sepsis    Pulmonary/Critical Care Acute respiratory failure;COPD;Other (comment)   bronchitis    Renal CKD              Nutrition/Diet History       03/05/18 0952    Nutrition/Diet History    Typical Food/Fluid Intake po usually good            Anthropometrics       03/05/18 0944    Anthropometrics    Height 165.1 cm (65\")    Weight 44.8 kg (98 lb 12.8 oz)    RD Documented Current Weight  44.8 kg (98 lb 12.8 oz)    Ideal Body Weight (IBW)    Ideal Body Weight (IBW), Male (kg) 62.51    % Ideal Body Weight 71.84    Usual Body Weight (UBW)    Weight Loss --   pt denies wt loss    Body Mass Index (BMI)    BMI (kg/m2) 16.48    BMI Grade 16 - 16.9 low grade II            Labs/Tests/Procedures/Meds       03/05/18 0945    Labs/Tests/Procedures/Meds    Diagnostic Test/Procedure Review reviewed    Labs/Tests Review Reviewed;Glucose;Creat;Hgb Hct    Medication Review Reviewed, pertinent;Insulin;Antacid    Significant Vitals reviewed            Physical Findings       03/05/18 0946    Physical Findings/Assessment    Additional Documentation Physical Appearance (Group)    Physical Appearance    Skin --   intact            Estimated/Assessed Needs       03/05/18 0946    Calculation Measurements    Weight Used For Calculations 44.8 kg (98 lb 12.3 oz)    Estimated/Assessed Energy Needs    Energy Need Method Kcal/kg    kcal/kg 35    35 Kcal/Kg (kcal) 1568    Estimated/Assessed Protein Needs    " Weight Used for Protein Calculation 44.8 kg (98 lb 12.3 oz)    Protein (gm/kg) 1.2    1.2 Gm Protein (gm) 53.76    Estimated/Assessed Fluid Needs    Fluid Need Method RDA method    RDA Method (mL)  1568            Nutrition Prescription Ordered       03/05/18 0947    Nutrition Prescription PO    Current PO Diet Regular    Supplement Glucerna Shake    Supplement Frequency 2 times a day    Common Modifiers Consistent Carbohydrate            Evaluation of Received Nutrient/Fluid Intake       03/05/18 0947    PO Evaluation    % PO Intake 100%            Problem/Interventions:        Problem 1       03/05/18 0952    Nutrition Diagnoses Problem 1    Signs/Symptoms (evidenced by) BMI      03/05/18 0947    Nutrition Diagnoses Problem 1    Problem 1 Increased Nutrient Needs    Etiology (related to) Medical Diagnosis    Pulmonary/Critical Care COPD;Acute respiratory failure                    Intervention Goal       03/05/18 0948    Intervention Goal    General Maintain nutrition    PO PO intake (%)    PO Intake % 80 %    Weight Appropriate weight gain            Nutrition Intervention       03/05/18 0948    Nutrition Intervention    RD/Tech Action Follow Tx progress;Care plan reviewd;Interview for preference;Supplement provided              Education/Evaluation       03/05/18 0948    Education    Education Will Instruct as appropriate    Monitor/Evaluation    Monitor Skin status        Electronically signed by:  Opal Mcfadden RD  03/05/18 9:53 AM

## 2018-03-05 NOTE — H&P
History and Physical    Patient Name: Louis Villalba  Age/Sex: 85 y.o. male  : 10/20/1932  MRN: [id number]    Date of Admission: 3/4/2018  Date of Encounter Visit: 18  Encounter Provider: Noni Bledsoe MD  Referring Provider: No ref. provider found  Place of Service: [facility]   Patient Care Team:  Adam Feliz MD as PCP - General (Internal Medicine)      Subjective:     Chief Complaint: Shortness of breath    History of Present Illness:  Louis Villalba is a 85 y.o. male that usually follows with Dr. Chavarria , He is a pleasant 85-year-old white male ex-70-pack-year smoker, quit in  with a past medical history significant for chronic obstructive pulmonary disease, asbestos related lung disease, peptic ulcer disease, gastric outlet obstruction, hyperlipidemia, age related macular degeneration, recent spontaneous left-sided pneumothorax, patient says that he has been a longtime smoker but has quit 10 years back.  He is extremely active and goes to gym twice a day and stays very active.  He woke up this morning he went to [employer] was doing fine but in the early afternoon he started having rapidly progressive chest tightness and was unable to breathe deeply enough.  He was not sure whether there was any associated wheezing but it was evident at the time of assessment.  Patient does not have a nebulizer at home but he is feeling better after the first nebulizer treatment in the emergency room.  Patient reported that the symptoms were similar to the time when he had his pneumothorax but his x-ray was not suggestive of that.  He denies any fever or chills he denies any productive secretion he does have his chronic cough which is no different.  He denies any sick exposure he denies any hemoptysis.  Patient is slim but he denies any weight loss and according the family is very physically active.  Patient had no mental status changes, he denies any GI or  complaints, he is to have some urinary  obstruction that did better with the Flomax.  No heat or cold intolerance no chills.    Pulmonary Functions Testing Results:      Review of Systems:   Review of Systems  Constitutional: Negative.  Negative for chills and fever.   HENT: Negative.  Negative for sore throat.    Eyes: Negative.    Respiratory: Positive for shortness of breath. Negative for cough.    Cardiovascular: Negative.  Negative for chest pain.   Gastrointestinal: Negative.    Genitourinary: Negative.  Negative for dysuria.   Musculoskeletal: Negative.  Negative for back pain.   Skin: Negative.  Negative for rash.   Neurological: Negative.  Negative for headaches.   Past Medical History:  Past Medical History:   Diagnosis Date   • Anemia    • Arthritis    • Cancer     skin: BASAL CELL on face, excised- remote   • Colon polyp    • History of transfusion    • Hyperlipidemia    • Macular degeneration        Past Surgical History:   Procedure Laterality Date   • APPENDECTOMY  1958   • COLONOSCOPY  2013   • ENDOSCOPY N/A 10/5/2016    Procedure: ESOPHAGOGASTRODUODENOSCOPY with biopsy;  Surgeon: Edward Gan MD;  Location: [facility] ENDOSCOPY;  Service:    • EYE SURGERY      cataract b/l surgery   • SKIN BIOPSY         Home Medications:     (Not in a hospital admission)    Inpatient Medications:  Scheduled Meds:  ipratropium-albuterol 3 mL Nebulization Once   methylPREDNISolone sodium succinate 125 mg Intravenous Once     Continuous Infusions:   PRN Meds:.sodium chloride  •  Insert peripheral IV **AND** sodium chloride    Allergies:  Allergies   Allergen Reactions   • Polymyxin B-Trimethoprim    • Sulfa Antibiotics Other (See Comments)     Cannot recall reaction       Past Social History:  Social History     Social History   • Marital status:      Social History Main Topics   • Smoking status: Former Smoker     Packs/day: 1.50     Years: 10.00     Types: Cigarettes     Start date: 12/24/2000     Quit date: 2010   • Smokeless tobacco: Never  Used   • Alcohol use 12.6 oz/week     21 Cans of beer per week      Comment: daily   • Drug use: No   • Sexual activity: Defer       Past Family History:  Family History   Problem Relation Age of Onset   • No Known Problems Mother    • No Known Problems Father            Objective:   Temp:  [97.4 °F (36.3 °C)] 97.4 °F (36.3 °C)  Heart Rate:  [105-120] 117  Resp:  [18-20] 20  BP: (145-193)/(105-110) 145/105   SpO2:  [94 %-100 %] 97 %  on  Flow (L/min):  [2] 2 O2 Device: nasal cannula  No intake or output data in the 24 hours ending 03/04/18 2018  Body mass index is 19.14 kg/(m^2).  Last 3 weights    03/04/18  1738   Weight: 52.2 kg (115 lb)     Weight change:     Physical Exam:   Physical Exam   General:    Mild respiratory distress, alert and oriented, oriented ×4, pleasant                    Head:    Normocephalic, atraumatic.   Eyes:          Conjunctivae and sclerae Is slightly congested, no icterus, PERRLA   Throat:   No oral lesions, no thrush, oral mucosa moist. No thrush    Neck:   Supple, trachea midline.No JVD    Lungs:     Normal chest on inspection,Breasts sounds are significantly diminished, he does have expiratory wheezes he does have mild prolongation of the expiratory phase, no crackles .     Heart:    Regular rhythm and Rapid heart rate.  No murmurs, gallops, or rubs noted.   Abdomen:     Soft, non-tender, non-distended, positive bowel sounds.    Extremities:   No clubbing, cyanosis, or edema.     Pulses:   Pulses palpable and equal bilaterally.    Skin:   No bleeding or rash.   Neuro:   Non-focal.  Moves all extremities well.    Psychiatric:   Normal mood and affect.     Lab Review:     Results from last 7 days  Lab Units 03/04/18  1744   SODIUM mmol/L 138   POTASSIUM mmol/L 5.1   CHLORIDE mmol/L 99   CO2 mmol/L 25.3   BUN mg/dL 18   CREATININE mg/dL 1.59*   GLUCOSE mg/dL 102*   CALCIUM mg/dL 9.3   AST (SGOT) U/L 26   ALT (SGPT) U/L 13   ALBUMIN g/dL 4.30   Results for LAVON REZA (MRN  [id number]) as of 3/4/2018 20:21   Ref. Range 10/4/2017 04:17 10/6/2017 04:29 12/24/2017 15:23 12/25/2017 05:59 3/4/2018 17:44   Creatinine Latest Ref Range: 0.76 - 1.27 mg/dL 1.20 1.19 1.66 (H) 1.12 1.59 (H)       Results from last 7 days  Lab Units 03/04/18  1744   TROPONIN T ng/mL <0.010       Results from last 7 days  Lab Units 03/04/18  1744   WBC 10*3/mm3 8.68   HEMOGLOBIN g/dL 12.3*   HEMATOCRIT % 38.0*   PLATELETS 10*3/mm3 212   MCV fL 96.4*   MCH pg 31.2   MCHC g/dL 32.4*   RDW % 14.2   RDW-SD fl 50.4   MPV fL 12.5*   NEUTROPHIL % % 61.3   LYMPHOCYTE % % 22.7   MONOCYTES % % 12.3*   EOSINOPHIL % % 3.2   BASOPHIL % % 0.3   IMM GRAN % % 0.2   NEUTROS ABS 10*3/mm3 5.31   LYMPHS ABS 10*3/mm3 1.97   MONOS ABS 10*3/mm3 1.07   EOS ABS 10*3/mm3 0.28   BASOS ABS 10*3/mm3 0.03   IMMATURE GRANS (ABS) 10*3/mm3 0.02       Results from last 7 days  Lab Units 03/04/18  1744   INR  1.01               Invalid input(s): LDLCALC    Results from last 7 days  Lab Units 03/04/18  1744   PROBNP pg/mL 238.5                                           ECG 3/4/2018:    Imaging:  Imaging Results (most recent)     Procedure Component Value Units Date/Time    XR Chest 2 View [[id number]] Collected:  03/04/18 1949     Updated:  03/04/18 1949    Narrative:       CHEST 2 VIEWS     HISTORY: Shortness of air.     FINDINGS: 2 views of the chest demonstrate the heart to be within normal  limits in size. The diaphragm is flattened and AP diameter increased  consistent with COPD. Pleural calcification is noted limiting evaluation  in terms of evaluation for pulmonary nodules but no convincing pulmonary  nodules are identified. The nodule noted on the CT examination of  12/24/2017 is not able to be appreciated. This can be further assessed  with a CT examination of the chest. There is no evidence of focal  infiltrate, effusion or congestive failure.         CT chest from DEC 2017    I personally viewed and interpreted the patient's imaging studies:  No acute disease, bilateral calcified pleural plaque from his asbestosis.  No cardia megaly no pleural effusion.    Assessment:   1. Acute hypoxemic respiratory failure  2. Acute exacerbation of COPD, moderate COPD with moderate emphysema at baseline  3. Chronic kidney disease with creatinine slightly above baseline  4. History of recent pneumothorax and Haemophilus influenza infection with no evidence of recurrence  5. 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 Sinus tachycardia on EKG    Plan:     Patient will be 23 hour observation one point in a telemetry unit and treated with steroids and bronchodilators  Patient would benefit from having a nebulizer machine after discharge  We'll treat with Zithromax antibiotic for now  Titrate and wean oxygen as tolerated keeping saturation above 90%  Low risk for DVT no need for DVT or stress ulcer prophylaxis  Follow-up on the renal function, gentle hydration  Patient will be on Accu-Chek sliding scale insulin while on the IV steroids  D-dimer was slightly elevated but the exam and the clinical picture is strongly consistent with acute bronchitis    Discussed with Dr. Fry, discussed with the patient and with the family at the bedside          Noni Bledsoe MD  [facility]   03/04/18  8:18 PM    Dictated utilizing Dragon dictation:  Much of this encounter note is an electronic transcription/translation of spoken language to printed text. The electronic translation of spoken language may permit erroneous, or at times, nonsensical words or phrases to be inadvertently transcribed; Although I have reviewed the note for such errors, some may still exist.

## 2018-03-06 ENCOUNTER — APPOINTMENT (OUTPATIENT)
Dept: GENERAL RADIOLOGY | Facility: HOSPITAL | Age: 83
End: 2018-03-06
Attending: INTERNAL MEDICINE

## 2018-03-06 ENCOUNTER — APPOINTMENT (OUTPATIENT)
Dept: CT IMAGING | Facility: HOSPITAL | Age: 83
End: 2018-03-06

## 2018-03-06 ENCOUNTER — APPOINTMENT (OUTPATIENT)
Dept: GENERAL RADIOLOGY | Facility: HOSPITAL | Age: 83
End: 2018-03-06

## 2018-03-06 LAB
GLUCOSE BLDC GLUCOMTR-MCNC: 114 MG/DL (ref 70–130)
GLUCOSE BLDC GLUCOMTR-MCNC: 188 MG/DL (ref 70–130)
GLUCOSE BLDC GLUCOMTR-MCNC: 91 MG/DL (ref 70–130)
GLUCOSE BLDC GLUCOMTR-MCNC: 96 MG/DL (ref 70–130)

## 2018-03-06 PROCEDURE — 63710000001 INSULIN ASPART PER 5 UNITS: Performed by: INTERNAL MEDICINE

## 2018-03-06 PROCEDURE — 99221 1ST HOSP IP/OBS SF/LOW 40: CPT | Performed by: NURSE PRACTITIONER

## 2018-03-06 PROCEDURE — 0W9B30Z DRAINAGE OF LEFT PLEURAL CAVITY WITH DRAINAGE DEVICE, PERCUTANEOUS APPROACH: ICD-10-PCS | Performed by: RADIOLOGY

## 2018-03-06 PROCEDURE — 71046 X-RAY EXAM CHEST 2 VIEWS: CPT

## 2018-03-06 PROCEDURE — 82962 GLUCOSE BLOOD TEST: CPT

## 2018-03-06 PROCEDURE — 94799 UNLISTED PULMONARY SVC/PX: CPT

## 2018-03-06 PROCEDURE — 25010000002 METHYLPREDNISOLONE PER 40 MG: Performed by: INTERNAL MEDICINE

## 2018-03-06 PROCEDURE — 25010000002 HYDROMORPHONE PER 4 MG: Performed by: INTERNAL MEDICINE

## 2018-03-06 PROCEDURE — 77002 NEEDLE LOCALIZATION BY XRAY: CPT

## 2018-03-06 PROCEDURE — C1729 CATH, DRAINAGE: HCPCS

## 2018-03-06 RX ORDER — HYDROCODONE BITARTRATE AND ACETAMINOPHEN 5; 325 MG/1; MG/1
1 TABLET ORAL EVERY 6 HOURS PRN
Status: DISCONTINUED | OUTPATIENT
Start: 2018-03-06 | End: 2018-03-10 | Stop reason: HOSPADM

## 2018-03-06 RX ORDER — IPRATROPIUM BROMIDE AND ALBUTEROL SULFATE 2.5; .5 MG/3ML; MG/3ML
3 SOLUTION RESPIRATORY (INHALATION)
Status: DISCONTINUED | OUTPATIENT
Start: 2018-03-06 | End: 2018-03-10 | Stop reason: HOSPADM

## 2018-03-06 RX ORDER — LIDOCAINE HYDROCHLORIDE 10 MG/ML
10 INJECTION, SOLUTION INFILTRATION; PERINEURAL ONCE
Status: COMPLETED | OUTPATIENT
Start: 2018-03-06 | End: 2018-03-06

## 2018-03-06 RX ADMIN — LIDOCAINE HYDROCHLORIDE 8 ML: 10 INJECTION, SOLUTION INFILTRATION; PERINEURAL at 14:20

## 2018-03-06 RX ADMIN — METHYLPREDNISOLONE SODIUM SUCCINATE 40 MG: 40 INJECTION, POWDER, FOR SOLUTION INTRAMUSCULAR; INTRAVENOUS at 21:21

## 2018-03-06 RX ADMIN — HYDROCODONE BITARTRATE AND ACETAMINOPHEN 1 TABLET: 5; 325 TABLET ORAL at 18:30

## 2018-03-06 RX ADMIN — ASPIRIN 81 MG: 81 TABLET, CHEWABLE ORAL at 08:07

## 2018-03-06 RX ADMIN — HYDROMORPHONE HYDROCHLORIDE 0.5 MG: 1 INJECTION, SOLUTION INTRAMUSCULAR; INTRAVENOUS; SUBCUTANEOUS at 15:35

## 2018-03-06 RX ADMIN — BRIMONIDINE TARTRATE, TIMOLOL MALEATE 1 DROP: 2; 5 SOLUTION/ DROPS OPHTHALMIC at 21:22

## 2018-03-06 RX ADMIN — IPRATROPIUM BROMIDE AND ALBUTEROL SULFATE 3 ML: .5; 3 SOLUTION RESPIRATORY (INHALATION) at 21:56

## 2018-03-06 RX ADMIN — INSULIN ASPART 2 UNITS: 100 INJECTION, SOLUTION INTRAVENOUS; SUBCUTANEOUS at 21:20

## 2018-03-06 RX ADMIN — IPRATROPIUM BROMIDE AND ALBUTEROL SULFATE 3 ML: .5; 3 SOLUTION RESPIRATORY (INHALATION) at 16:02

## 2018-03-06 RX ADMIN — DOCUSATE SODIUM 100 MG: 100 CAPSULE, LIQUID FILLED ORAL at 08:06

## 2018-03-06 RX ADMIN — SODIUM CHLORIDE 100 ML/HR: 9 INJECTION, SOLUTION INTRAVENOUS at 05:07

## 2018-03-06 RX ADMIN — IPRATROPIUM BROMIDE AND ALBUTEROL SULFATE 3 ML: .5; 3 SOLUTION RESPIRATORY (INHALATION) at 10:17

## 2018-03-06 RX ADMIN — AZITHROMYCIN 500 MG: 250 TABLET, FILM COATED ORAL at 08:07

## 2018-03-06 RX ADMIN — PANTOPRAZOLE SODIUM 40 MG: 40 TABLET, DELAYED RELEASE ORAL at 05:07

## 2018-03-06 RX ADMIN — METHYLPREDNISOLONE SODIUM SUCCINATE 40 MG: 40 INJECTION, POWDER, FOR SOLUTION INTRAMUSCULAR; INTRAVENOUS at 08:07

## 2018-03-06 RX ADMIN — TAMSULOSIN HYDROCHLORIDE 0.4 MG: 0.4 CAPSULE ORAL at 08:07

## 2018-03-06 RX ADMIN — ATORVASTATIN CALCIUM 10 MG: 10 TABLET, FILM COATED ORAL at 08:07

## 2018-03-06 RX ADMIN — BRIMONIDINE TARTRATE, TIMOLOL MALEATE 1 DROP: 2; 5 SOLUTION/ DROPS OPHTHALMIC at 09:49

## 2018-03-06 NOTE — PROGRESS NOTES
"                                              LOS: 2 days   Patient Care Team:  Adam Feliz MD as PCP - General (Internal Medicine)    Chief Complaint:  Follow-up on COPD, no pneumothorax and medical problems listed below    Interval History:   Patient reported doing well since yesterday.  He developed some chest pain, rated 3/10 in intensity after the insertion of the chest tube.  I did see him just when he came back from the chest tube insertion today.  He reported shortness of breath with this the same as usual with no worsening.  No cough     REVIEW OF SYSTEMS:   CARDIOVASCULAR: Chest pain at this site of chest tube, chest pressure or chest discomfort. No palpitations or edema.   RESPIRATORY: see above  GASTROINTESTINAL: No anorexia, nausea, vomiting or diarrhea. No abdominal pain or blood.   HEMATOLOGIC: No bleeding or bruising.     Ventilator/Non-Invasive Ventilation Settings     None            Vital Signs  Temp:  [97.2 °F (36.2 °C)-98.1 °F (36.7 °C)] 98 °F (36.7 °C)  Heart Rate:  [] 87  Resp:  [18-28] 20  BP: (114-152)/(74-94) 123/75    Intake/Output Summary (Last 24 hours) at 03/06/18 1412  Last data filed at 03/06/18 0854   Gross per 24 hour   Intake          1346.67 ml   Output              600 ml   Net           746.67 ml     Flowsheet Rows         First Filed Value    Admission Height  165.1 cm (65\") Documented at 03/04/2018 1736    Admission Weight  52.2 kg (115 lb) Documented at 03/04/2018 1738          Physical Exam:   General Appearance:    Alert, cooperative, in no acute distress   Lungs:     Significantly decreased air entry throughout.  No wheezing or crackles     Heart:    Regular rhythm and normal rate, normal S1 and S2, no            murmur, no gallop, no rub, no click   Chest Wall:    No abnormalities observed   Abdomen:     Soft.  No tenderness    Neuro:   Conscious, alert, oriented x3   Extremities:   Moves all extremities well, no edema, no cyanosis, no             Redness     "      Results Review:          Results from last 7 days  Lab Units 03/05/18  0435 03/04/18  1744   SODIUM mmol/L 138 138   POTASSIUM mmol/L 4.7 5.1   CHLORIDE mmol/L 102 99   CO2 mmol/L 22.5 25.3   BUN mg/dL 18 18   CREATININE mg/dL 1.19 1.59*   GLUCOSE mg/dL 123* 102*   CALCIUM mg/dL 8.9 9.3       Results from last 7 days  Lab Units 03/04/18  1744   TROPONIN T ng/mL <0.010       Results from last 7 days  Lab Units 03/04/18  1744   WBC 10*3/mm3 8.68   HEMOGLOBIN g/dL 12.3*   HEMATOCRIT % 38.0*   PLATELETS 10*3/mm3 212       Results from last 7 days  Lab Units 03/04/18  1744   INR  1.01       Results from last 7 days  Lab Units 03/04/18  1744   PROBNP pg/mL 238.5       I reviewed the patient's new clinical results.  I personally viewed and interpreted the patient's CXR        Medication Review:     aspirin 81 mg Oral Daily   atorvastatin 10 mg Oral Daily   azithromycin 500 mg Oral Q24H   brimonidine-timolol 1 drop Both Eyes Q12H   docusate sodium 100 mg Oral Daily   insulin aspart 0-9 Units Subcutaneous 4x Daily With Meals & Nightly   ipratropium-albuterol 3 mL Nebulization Q8H - RT   methylPREDNISolone sodium succinate 40 mg Intravenous Q12H   pantoprazole 40 mg Oral Q AM   tamsulosin 0.4 mg Oral Daily         sodium chloride 100 mL/hr Last Rate: 100 mL/hr (03/06/18 0507)       Diagnostic imaging:  I personally and independently reviewed theFollowing images:  Since the last chest x-ray in December, there is interval development of left pneumothorax               Assessment/Plan   1. Shortness of breath, secondary to the problems listed below  2. Recurrent left sided pneumothorax, spontaneous.  First event occurred and October and treated with a chest tube. S/P small bore chest tube insertion on 3/6/18  3. Acute exacerbation of COPD, moderate COPD with moderate emphysema at baseline  4. BON on CKD, improved  5. 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 Sinus tachycardia on EKG  7. Macular  degeneration with difficulties using his inhaler  8. Left sided Chest pain related to the chest tube insertion    Chest tube inserted today.  Plan is for talc pleurodesis by Dr. Murray.  Agree with that.  Mild air leak was noted upon connecting the chest tube to suction which resolved thereafter.  Chest tube to minimal remain on suction -20    Continue bronchodilators as above.  We will transition to nebulizers at home since patient has difficulties maneuvering his inhalers.    Initiate Lortab 5 mg every 6 hours when necessary for mild to moderate pain and Dilaudid 0.5 mg every 4 hours as needed for severe chest pain    Discussed with the patient, family and staff      Benji Huynh MD  03/06/18  2:12 PM          This note was dictated utilizing Dragon dictation

## 2018-03-06 NOTE — PLAN OF CARE
Problem: Patient Care Overview (Adult)  Goal: Plan of Care Review  Outcome: Ongoing (interventions implemented as appropriate)   03/06/18 0211   Coping/Psychosocial Response Interventions   Plan Of Care Reviewed With patient   Patient Care Overview   Progress no change      03/06/18 0211   Coping/Psychosocial Response Interventions   Plan Of Care Reviewed With patient   Patient Care Overview   Progress no change   Outcome Evaluation   Outcome Summary/Follow up Plan pt VSS, no c/o pain or SOA, pt on 2 L NC and maintaining oxygenation levels at 94%, LS diminished all 4Q, chest xray scheduled for AM, pt states family concerned with plan going forward regarding possible surgery.      Goal: Adult Individualization and Mutuality  Outcome: Ongoing (interventions implemented as appropriate)   03/06/18 0211   Individualization   Patient Specific Goals improve fluid status, control pain, maintain oxygenation level, improve lung function   Patient Specific Interventions pain med PRN, O2 therapy cont, RT      Goal: Discharge Needs Assessment  Outcome: Ongoing (interventions implemented as appropriate)   03/06/18 0211   Discharge Needs Assessment   Concerns To Be Addressed adjustment to diagnosis/illness concerns;compliance issue concerns;coping/stress concerns   Readmission Within The Last 30 Days no previous admission in last 30 days   Discharge Disposition still a patient   Current Health   Anticipated Changes Related to Illness none   Self-Care   Equipment Currently Used at Home none   Living Environment   Transportation Available family or friend will provide       Problem: COPD, Chronic Bronchitis/Emphysema (Adult)  Goal: Signs and Symptoms of Listed Potential Problems Will be Absent or Manageable (COPD, Chronic Bronchitis/Emphysema)  Outcome: Ongoing (interventions implemented as appropriate)   03/06/18 0211   COPD, Chronic Bronchitis/Emphysema   Problems Assessed (COPD, Chronic Bronchitis/Emphysema) all   Problems  Present (COPD, Chronic Bronchitis/Emphysema) atelectasis;dyspnea;hemodynamic instability       Problem: Respiratory Insufficiency (Adult)  Goal: Identify Related Risk Factors and Signs and Symptoms  Outcome: Ongoing (interventions implemented as appropriate)   03/06/18 0211   Respiratory Insufficiency   Related Risk Factors (Respiratory Insufficiency) medication effects;smoking   Signs and Symptoms (Respiratory Insufficiency) abnormal breath sounds;cough (productive/ineffective)     Goal: Acid/Base Balance  Outcome: Ongoing (interventions implemented as appropriate)   03/06/18 0211   Respiratory Insufficiency (Adult)   Acid/Base Balance making progress toward outcome     Goal: Effective Ventilation  Outcome: Ongoing (interventions implemented as appropriate)   03/06/18 0211   Respiratory Insufficiency (Adult)   Effective Ventilation making progress toward outcome       Problem: Fluid Volume Deficit (Adult)  Goal: Identify Related Risk Factors and Signs and Symptoms  Outcome: Ongoing (interventions implemented as appropriate)   03/06/18 0211   Fluid Volume Deficit   Fluid Volume Deficit: Related Risk Factors age extremes;medication effects;pain;third space shift   Signs and Symptoms (Fluid Volume Deficit) hemodynamic changes;muscle weakness     Goal: Fluid/Electrolyte Balance  Outcome: Ongoing (interventions implemented as appropriate)   03/06/18 0211   Fluid Volume Deficit (Adult)   Fluid/Electrolyte Balance making progress toward outcome     Goal: Comfort/Well Being  Outcome: Ongoing (interventions implemented as appropriate)   03/06/18 0211   Fluid Volume Deficit (Adult)   Comfort/Well Being making progress toward outcome       Problem: Infection, Risk/Actual (Adult)  Goal: Identify Related Risk Factors and Signs and Symptoms  Outcome: Ongoing (interventions implemented as appropriate)   03/06/18 0211   Infection, Risk/Actual   Infection, Risk/Actual: Related Risk Factors age extremes;chronic  illness/condition;tissue perfusion altered   Signs and Symptoms (Infection, Risk/Actual) blood glucose changes     Goal: Infection Prevention/Resolution  Outcome: Ongoing (interventions implemented as appropriate)   03/06/18 0211   Infection, Risk/Actual (Adult)   Infection Prevention/Resolution making progress toward outcome

## 2018-03-06 NOTE — CONSULTS
Inpatient Thoracic Surgery Consult  Consult performed by: MARIA A GRIER  Consult ordered by: IMER ROSE  Reason for consult: Recurrent spontaneous left pneumothorax  Assessment/Recommendations: No plans for surgical VAT intervention due to patient's age and COPD.  Chest tube to be placed under fluoroscopy in radiology today.  Once chest tube is placed, we will connect to -20 cm of suction.  We will continue to monitor with serial chest x-rays.  Will plan for bedside pleurodesis once lung is reexpanded and there is no evidence of air leak.            Patient Care Team:  Adam Feliz MD as PCP - General (Internal Medicine)    Chief Complaint   Patient presents with   • Shortness of Breath     history of collapsed lung and patient feels the same way today        Subjective     History of Present Illness  Louis Villalba is a 85-year-old male known to our service from previous hospitalization.  Patient has had a prior history of a spontaneous left-sided pneumothorax back in October 2017. Spontaneous pneumothorax likely related to his history of COPD.  He required chest tube placement at that time and lung reexpanded well.  Follow-up office visit showed no recurrence of the pneumothorax and he was instructed follow up with us as needed.  Since that time he had been doing well except for dyspnea with exertion.  Two days ago, he developed similar symptoms he had with previous pneumothorax of difficulty breathing and worsening shortness of breath.  He presented to the emergency room for evaluation.  Had a chest x-ray completed which showed a small left-sided pneumothorax measuring approximately 2 cm.  He was admitted to [facility] for further management.  Repeat chest x-ray showed increase in the size of the pneumothorax.  We were consulted due to these findings.  At time of consult today, he continues to complain of shortness breath and wheezing.  He denies any pleuritic chest pain, cough, hemoptysis, or any other  concerns.    Review of Systems   Constitutional: Negative.    HENT: Negative.    Respiratory: Positive for shortness of breath and wheezing. Negative for cough and chest tightness.         Dyspnea with exertion decreases with rest.   Cardiovascular: Negative.    Gastrointestinal: Negative.    Endocrine: Negative.    Genitourinary: Negative.    Musculoskeletal: Negative.    Skin: Negative.    Neurological: Negative.    Hematological: Negative.    Psychiatric/Behavioral: Negative.         Patient Active Problem List   Diagnosis   • Gastric outlet obstruction   • Primary spontaneous pneumothorax   • COPD (chronic obstructive pulmonary disease)   • Acute exacerbation of chronic obstructive pulmonary disease (COPD)   • Influenza B   • HTN (hypertension)   • Lung nodule   • CKD (chronic kidney disease) stage 3, GFR 30-59 ml/min   • COPD exacerbation     Past Medical History:   Diagnosis Date   • Anemia    • Arthritis    • Cancer     skin: BASAL CELL on face, excised- remote   • Colon polyp    • History of transfusion    • Hyperlipidemia    • Macular degeneration      Past Surgical History:   Procedure Laterality Date   • APPENDECTOMY  1958   • COLONOSCOPY  2013   • ENDOSCOPY N/A 10/5/2016    Procedure: ESOPHAGOGASTRODUODENOSCOPY with biopsy;  Surgeon: Edward Gan MD;  Location: [facility] ENDOSCOPY;  Service:    • EYE SURGERY      cataract b/l surgery   • SKIN BIOPSY       Family History   Problem Relation Age of Onset   • No Known Problems Mother    • No Known Problems Father      Social History     Social History   • Marital status:      Spouse name: N/A   • Number of children: N/A   • Years of education: N/A     Occupational History   • Not on file.     Social History Main Topics   • Smoking status: Former Smoker     Packs/day: 1.50     Years: 10.00     Types: Cigarettes     Start date: 12/24/2000     Quit date: 2010   • Smokeless tobacco: Never Used   • Alcohol use 12.6 oz/week     21 Cans of beer per week       Comment: daily   • Drug use: No   • Sexual activity: Defer     Other Topics Concern   • Not on file     Social History Narrative     Prescriptions Prior to Admission   Medication Sig Dispense Refill Last Dose   • acetaminophen (TYLENOL) 325 MG tablet Take 2 tablets by mouth Every 6 (Six) Hours As Needed for Mild Pain .   Taking   • albuterol (PROVENTIL HFA;VENTOLIN HFA) 108 (90 Base) MCG/ACT inhaler Inhale 2 puffs Every 4 (Four) Hours As Needed for Wheezing. 2 inhaler 0    • aspirin 81 MG chewable tablet Chew 81 mg Daily.   Taking   • atorvastatin (LIPITOR) 10 MG tablet Take 10 mg by mouth Daily.   Taking   • brimonidine-timolol (COMBIGAN) 0.2-0.5 % ophthalmic solution Administer 1 drop to both eyes Every 12 (Twelve) Hours.   Taking   • doxycycline (VIBRAMYCIN) 50 MG capsule Take 50 mg by mouth 2 (Two) Times a Day.      • Multiple Vitamins-Minerals (VITEYES AREDS FORMULA PO) Take 2 tablets by mouth Daily.   Taking   • pantoprazole (PROTONIX) 40 MG EC tablet take 1 tablet by mouth once daily 30 tablet 5    • tamsulosin (FLOMAX) 0.4 MG capsule 24 hr capsule Take 1 capsule by mouth Daily.      • Tiotropium Bromide Monohydrate (SPIRIVA RESPIMAT) 2.5 MCG/ACT aerosol solution Inhale 5 mg Daily.        Allergies   Allergen Reactions   • Polymyxin B-Trimethoprim    • Sulfa Antibiotics Other (See Comments)     Cannot recall reaction       Objective      Vital Signs  Temp:  [97.2 °F (36.2 °C)-98.1 °F (36.7 °C)] 98 °F (36.7 °C)  Heart Rate:  [] 87  Resp:  [18-28] 20  BP: (114-152)/(74-94) 123/75    Intake & Output (last day)       03/05 0701 - 03/06 0700 03/06 0701 - 03/07 0700    P.O. 480 360    I.V. (mL/kg) 1810 (40.4)     Total Intake(mL/kg) 2290 (51.1) 360 (8)    Urine (mL/kg/hr) 300 (0.3) 300 (1.2)    Total Output 300 300    Net +1990 +60          Unmeasured Urine Occurrence 2 x           Physical Exam   Constitutional: He is oriented to person, place, and time. Vital signs are normal. He appears  well-developed. No distress.   HENT:   Head: Normocephalic and atraumatic.   Neck: Normal range of motion. Neck supple.   Cardiovascular: Normal rate, regular rhythm, normal heart sounds and intact distal pulses.    No murmur heard.  Pulmonary/Chest: Effort normal. No accessory muscle usage. No respiratory distress. He has decreased breath sounds in the left upper field and the left middle field. He has wheezes in the right upper field and the left upper field. He has no rhonchi. He has no rales. He exhibits no tenderness.   O2 at 2 L/m with O2 sats of 97%.   Abdominal: Soft. There is no tenderness.   Musculoskeletal: Normal range of motion. He exhibits no edema or tenderness.   Neurological: He is alert and oriented to person, place, and time. He has normal strength.   Skin: Skin is warm and dry. No rash noted. No cyanosis or erythema.   Psychiatric: He has a normal mood and affect. His behavior is normal.       Results Review:    I reviewed the patient's new clinical results.  I reviewed the patient's new imaging results and agree with the interpretation.    Imaging Results (last 24 hours)     Procedure Component Value Units Date/Time    XR Chest PA & Lateral [[id number]] Collected:  03/05/18 1556     Updated:  03/05/18 1635    Narrative:       TWO-VIEW CHEST     HISTORY: Follow-up of pneumothorax. Emphysema.     There is very severe COPD with marked hyperinflation and there are  multiple scattered calcified granulomas. Again noted is a left-sided  pneumothorax extending along the lateral margin of the chest wall and  measuring up to 2.3 cm in thickness compared to 2.0 cm on yesterday's  exam. There is no mediastinal shift. The heart size remains normal.     This report was finalized on 3/5/2018 4:32 PM by Dr. Jose Lee MD.             Lab Results:  Lab Results (last 24 hours)     Procedure Component Value Units Date/Time    POC Glucose Once [[id number]]  (Normal) Collected:  03/05/18 1649    Specimen:  Blood  Updated:  03/05/18 1658     Glucose 127 mg/dL     Narrative:       Meter: [id number] : [id number] Joni YOUSIF NA    POC Glucose Once [[id number]]  (Normal) Collected:  03/05/18 2024    Specimen:  Blood Updated:  03/05/18 2025     Glucose 127 mg/dL     Narrative:       Meter: [id number] : [id number] Keith Cooper NA    POC Glucose Once [[id number]]  (Normal) Collected:  03/06/18 0747    Specimen:  Blood Updated:  03/06/18 0748     Glucose 96 mg/dL     Narrative:       Meter: [id number] : [id number] Rosetta Longoria NA    POC Glucose Once [[id number]]  (Normal) Collected:  03/06/18 1047    Specimen:  Blood Updated:  03/06/18 1048     Glucose 114 mg/dL     Narrative:       Meter: [id number] : [id number] Rosetta Longoria NA              Assessment/Plan     Active Problems:    COPD exacerbation      Assessment:  (Recurrent left spontaneous pneumothorax  COPD  Dyspnea with exertion).     Plan:   (After evaluation of patient and review of recent diagnostic studies, do not recommend surgical thoracic VAT intervention at this time due to patient's age and prior history of COPD.  Will order for placement of chest tube under fluoroscopy in radiology today.  Once chest tube is placed, we will connect to -20 cm of suction.  We will continue to monitor with serial chest x-rays. Once lung reexpanded and there is no evidence of air leak, we will plan for bedside pleurodesis at that time. Continue pain management, pulmonary hygiene, and O2 therapy.  ).       I discussed the patients findings and our recommendations with patient, family and nursing staff    Thank you for this consult and allowing us to participate in the care of your patient.  We will follow along with you during this hospitalization.       Tracey Quiroga, APRN  [facility]  03/06/18  12:48 PM

## 2018-03-06 NOTE — PLAN OF CARE
Problem: Patient Care Overview (Adult)  Goal: Plan of Care Review  Outcome: Ongoing (interventions implemented as appropriate)   03/06/18 1439   Coping/Psychosocial Response Interventions   Plan Of Care Reviewed With patient   Patient Care Overview   Progress no change   Outcome Evaluation   Outcome Summary/Follow up Plan dilip vss. patient on 2 liters oxygen. patient getting chest tube placed in radiology today. will monitor.       Problem: COPD, Chronic Bronchitis/Emphysema (Adult)  Goal: Signs and Symptoms of Listed Potential Problems Will be Absent or Manageable (COPD, Chronic Bronchitis/Emphysema)  Outcome: Ongoing (interventions implemented as appropriate)   03/06/18 1439   COPD, Chronic Bronchitis/Emphysema   Problems Assessed (COPD, Chronic Bronchitis/Emphysema) all   Problems Present (COPD, Chronic Bronchitis/Emphysema) hypoxia/hypoxemia;dyspnea       Problem: Respiratory Insufficiency (Adult)  Goal: Acid/Base Balance  Outcome: Ongoing (interventions implemented as appropriate)   03/06/18 1439   Respiratory Insufficiency (Adult)   Acid/Base Balance making progress toward outcome     Goal: Effective Ventilation  Outcome: Ongoing (interventions implemented as appropriate)   03/06/18 1439   Respiratory Insufficiency (Adult)   Effective Ventilation making progress toward outcome       Problem: Fluid Volume Deficit (Adult)  Goal: Fluid/Electrolyte Balance  Outcome: Ongoing (interventions implemented as appropriate)   03/06/18 1439   Fluid Volume Deficit (Adult)   Fluid/Electrolyte Balance making progress toward outcome     Goal: Comfort/Well Being  Outcome: Ongoing (interventions implemented as appropriate)   03/06/18 1439   Fluid Volume Deficit (Adult)   Comfort/Well Being making progress toward outcome       Problem: Infection, Risk/Actual (Adult)  Goal: Infection Prevention/Resolution  Outcome: Ongoing (interventions implemented as appropriate)   03/06/18 1439   Infection, Risk/Actual (Adult)   Infection  Prevention/Resolution making progress toward outcome       Problem: Chest Tube Drainage Device (Adult)  Goal: Signs and Symptoms of Listed Potential Problems Will be Absent or Manageable (Chest Tube Drainage Device)  Outcome: Ongoing (interventions implemented as appropriate)   03/06/18 1439   Chest Tube Drainage Device   Problems Assessed (Chest Tube Drainage Device) all

## 2018-03-07 ENCOUNTER — APPOINTMENT (OUTPATIENT)
Dept: GENERAL RADIOLOGY | Facility: HOSPITAL | Age: 83
End: 2018-03-07

## 2018-03-07 LAB
GLUCOSE BLDC GLUCOMTR-MCNC: 125 MG/DL (ref 70–130)
GLUCOSE BLDC GLUCOMTR-MCNC: 132 MG/DL (ref 70–130)
GLUCOSE BLDC GLUCOMTR-MCNC: 96 MG/DL (ref 70–130)
GLUCOSE BLDC GLUCOMTR-MCNC: 99 MG/DL (ref 70–130)

## 2018-03-07 PROCEDURE — 25010000002 KETOROLAC TROMETHAMINE PER 15 MG: Performed by: THORACIC SURGERY (CARDIOTHORACIC VASCULAR SURGERY)

## 2018-03-07 PROCEDURE — 94799 UNLISTED PULMONARY SVC/PX: CPT

## 2018-03-07 PROCEDURE — 82962 GLUCOSE BLOOD TEST: CPT

## 2018-03-07 PROCEDURE — 25010000002 METHYLPREDNISOLONE PER 40 MG: Performed by: INTERNAL MEDICINE

## 2018-03-07 PROCEDURE — 3E0L3GC INTRODUCTION OF OTHER THERAPEUTIC SUBSTANCE INTO PLEURAL CAVITY, PERCUTANEOUS APPROACH: ICD-10-PCS | Performed by: THORACIC SURGERY (CARDIOTHORACIC VASCULAR SURGERY)

## 2018-03-07 PROCEDURE — 71045 X-RAY EXAM CHEST 1 VIEW: CPT

## 2018-03-07 PROCEDURE — 25010000002 HYDROMORPHONE PER 4 MG: Performed by: INTERNAL MEDICINE

## 2018-03-07 PROCEDURE — 99232 SBSQ HOSP IP/OBS MODERATE 35: CPT | Performed by: NURSE PRACTITIONER

## 2018-03-07 RX ORDER — KETOROLAC TROMETHAMINE 15 MG/ML
15 INJECTION, SOLUTION INTRAMUSCULAR; INTRAVENOUS ONCE
Status: DISCONTINUED | OUTPATIENT
Start: 2018-03-07 | End: 2018-03-10 | Stop reason: HOSPADM

## 2018-03-07 RX ORDER — KETOROLAC TROMETHAMINE 15 MG/ML
15 INJECTION, SOLUTION INTRAMUSCULAR; INTRAVENOUS ONCE
Status: COMPLETED | OUTPATIENT
Start: 2018-03-07 | End: 2018-03-07

## 2018-03-07 RX ORDER — LIDOCAINE HYDROCHLORIDE 10 MG/ML
10 INJECTION, SOLUTION EPIDURAL; INFILTRATION; INTRACAUDAL; PERINEURAL ONCE
Status: COMPLETED | OUTPATIENT
Start: 2018-03-07 | End: 2018-03-07

## 2018-03-07 RX ADMIN — KETOROLAC TROMETHAMINE 15 MG: 15 INJECTION, SOLUTION INTRAMUSCULAR; INTRAVENOUS at 12:48

## 2018-03-07 RX ADMIN — HYDROCODONE BITARTRATE AND ACETAMINOPHEN 1 TABLET: 5; 325 TABLET ORAL at 11:59

## 2018-03-07 RX ADMIN — IPRATROPIUM BROMIDE AND ALBUTEROL SULFATE 3 ML: .5; 3 SOLUTION RESPIRATORY (INHALATION) at 09:02

## 2018-03-07 RX ADMIN — SODIUM CHLORIDE 100 ML/HR: 9 INJECTION, SOLUTION INTRAVENOUS at 04:06

## 2018-03-07 RX ADMIN — METHYLPREDNISOLONE SODIUM SUCCINATE 40 MG: 40 INJECTION, POWDER, FOR SOLUTION INTRAMUSCULAR; INTRAVENOUS at 21:34

## 2018-03-07 RX ADMIN — SODIUM CHLORIDE 100 ML/HR: 9 INJECTION, SOLUTION INTRAVENOUS at 23:26

## 2018-03-07 RX ADMIN — DOCUSATE SODIUM 100 MG: 100 CAPSULE, LIQUID FILLED ORAL at 10:00

## 2018-03-07 RX ADMIN — PANTOPRAZOLE SODIUM 40 MG: 40 TABLET, DELAYED RELEASE ORAL at 07:50

## 2018-03-07 RX ADMIN — IPRATROPIUM BROMIDE AND ALBUTEROL SULFATE 3 ML: .5; 3 SOLUTION RESPIRATORY (INHALATION) at 22:40

## 2018-03-07 RX ADMIN — DOXYCYCLINE 500 MG: 100 INJECTION, POWDER, LYOPHILIZED, FOR SOLUTION INTRAVENOUS at 10:46

## 2018-03-07 RX ADMIN — ASPIRIN 81 MG: 81 TABLET, CHEWABLE ORAL at 10:00

## 2018-03-07 RX ADMIN — SODIUM CHLORIDE 100 ML/HR: 9 INJECTION, SOLUTION INTRAVENOUS at 13:18

## 2018-03-07 RX ADMIN — METHYLPREDNISOLONE SODIUM SUCCINATE 40 MG: 40 INJECTION, POWDER, FOR SOLUTION INTRAMUSCULAR; INTRAVENOUS at 10:00

## 2018-03-07 RX ADMIN — IPRATROPIUM BROMIDE AND ALBUTEROL SULFATE 3 ML: .5; 3 SOLUTION RESPIRATORY (INHALATION) at 15:56

## 2018-03-07 RX ADMIN — HYDROMORPHONE HYDROCHLORIDE 0.5 MG: 1 INJECTION, SOLUTION INTRAMUSCULAR; INTRAVENOUS; SUBCUTANEOUS at 23:28

## 2018-03-07 RX ADMIN — HYDROMORPHONE HYDROCHLORIDE 0.5 MG: 1 INJECTION, SOLUTION INTRAMUSCULAR; INTRAVENOUS; SUBCUTANEOUS at 10:37

## 2018-03-07 RX ADMIN — BRIMONIDINE TARTRATE, TIMOLOL MALEATE 1 DROP: 2; 5 SOLUTION/ DROPS OPHTHALMIC at 10:07

## 2018-03-07 RX ADMIN — HYDROMORPHONE HYDROCHLORIDE 0.5 MG: 1 INJECTION, SOLUTION INTRAMUSCULAR; INTRAVENOUS; SUBCUTANEOUS at 00:54

## 2018-03-07 RX ADMIN — ATORVASTATIN CALCIUM 10 MG: 10 TABLET, FILM COATED ORAL at 10:00

## 2018-03-07 RX ADMIN — AZITHROMYCIN 500 MG: 250 TABLET, FILM COATED ORAL at 10:00

## 2018-03-07 RX ADMIN — BRIMONIDINE TARTRATE, TIMOLOL MALEATE 1 DROP: 2; 5 SOLUTION/ DROPS OPHTHALMIC at 23:47

## 2018-03-07 RX ADMIN — TAMSULOSIN HYDROCHLORIDE 0.4 MG: 0.4 CAPSULE ORAL at 10:00

## 2018-03-07 RX ADMIN — LIDOCAINE HYDROCHLORIDE 10 ML: 10 INJECTION, SOLUTION EPIDURAL; INFILTRATION; INTRACAUDAL; PERINEURAL at 10:45

## 2018-03-07 NOTE — PROGRESS NOTES
"    Chief Complaint   Patient presents with   • Shortness of Breath     history of collapsed lung and patient feels the same way today        Consulted for: Recurrent left spontaneous pneumothorax  S/P: Chest tube placement under fluoro  POD # 1    Subjective:  Symptoms:  Stable.  No shortness of breath.  (The catheter was placed yesterday.  No problems or concerns.  No worsening shortness of breath. ).    Pain:  He reports no pain.        Vital Signs:  Temp:  [97.5 °F (36.4 °C)-98.1 °F (36.7 °C)] 98.1 °F (36.7 °C)  Heart Rate:  [68-94] 93  Resp:  [16-18] 18  BP: (119-164)/(75-86) 164/86    Intake & Output (last day)       03/06 0701 - 03/07 0700 03/07 0701 - 03/08 0700    P.O. 720 240    I.V. (mL/kg) 2181.7 (48.7)     IV Piggyback  250    Total Intake(mL/kg) 2901.7 (64.7) 490 (10.9)    Urine (mL/kg/hr) 1000 (0.9) 200 (0.8)    Other 0 (0)     Stool 0 (0) 0 (0)    Total Output 1000 200    Net +1901.7 +290          Unmeasured Urine Occurrence 1 x     Unmeasured Stool Occurrence 0 x 0 x          Objective:  General Appearance:  Comfortable and in no acute distress.    Vital signs: (most recent): Blood pressure 164/86, pulse 93, temperature 98.1 °F (36.7 °C), temperature source Oral, resp. rate 18, height 165.1 cm (65\"), weight 44.8 kg (98 lb 12.8 oz), SpO2 100 %.  Vital signs are normal.  No fever.    Lungs:  Normal respiratory rate and normal effort.  There are decreased breath sounds.  (O2 at 2L/M per N/C.  )  Heart: Normal rate.  Regular rhythm.    Extremities: There is no dependent edema.    Neurological: Patient is alert and oriented to person, place and time.    Skin:  (Pleural cath is C/D/I.  )            Chest tube:   Site: Left, Clean, Dry and Intact  Suction: -20 cm  Air Leak: negative  24 Hour Total: 0    Results Review:     I reviewed the patient's new clinical results.  I reviewed the patient's new imaging results and agree with the interpretation.    Imaging Results (last 24 hours)     Procedure Component " Value Units Date/Time    XR Chest PA & Lateral [[id number]] Updated:  03/06/18 1445    IR Guided Chest Tube Placement [[id number]] Collected:  03/06/18 1447     Updated:  03/06/18 1454    Narrative:       FLUOROSCOPICALLY GUIDED LEFT CHEST TUBE PLACEMENT     HISTORY: Male who is 85 years-old, with a history of recent spontaneous  left pneumothorax presenting for left-sided chest tube placement.      The procedure, including the risk of hemorrhage, infection, diagnostic  or technical failure, was discussed with the patient prior to  examination and consent was obtained.     PROCEDURE:     Fluoroscopically guided imaging was performed and skin marker was  placed.     The patient was prepped and draped in the usual fashion. Following  adequate local anesthesia with 1% lidocaine, dermatotomy was placed and  needle placement performed.     Utilizing real-time fluoroscopic guidance, guidewire was advanced  through the needle into the pleural cavity and needle was removed. The  tract was dilated and subsequent 14 Citizen of [place] Kenny catheter was advanced  over the guidewire. Guidewire was removed.     Pneumothorax was evacuated and catheter was sutured in position with  standard technique and 1% Xylocaine.     Tube was attached to waterseal for drainage.     Follow-up imaging demonstrates no complicating feature.       Impression:       Satisfactory uneventful fluoroscopically guided chest tube  placement for treatment of left pneumothorax as described above.     FLUOROSCOPY TIME: 32 seconds, single image           TWO-VIEW CHEST     HISTORY: 85-year-old male with spontaneous left pneumothorax status post  fluoroscopic guided left chest tube placement     COMPARISON: Preop imaging of 3 of 518 which demonstrated 2.3 cm left  upper pneumothorax     FINDINGS:  1. 3 mm residual left apical pneumothorax with Kenny chest tube in good  position.  2. No other interval change.     This report was finalized on 3/6/2018 2:51 PM by Dr. Bello  MD Ponce.       XR Chest 1 View [[id number]] Collected:  03/07/18 0723     Updated:  03/07/18 0727    Narrative:       PORTABLE CHEST SINGLE VIEW     HISTORY: 85-year-old male for chest tube management status post  fluoroscopically guided Kenny catheter placement yesterday     COMPARISON: 03/06/2018     FINDINGS:  1. Left chest tubes in good position, no pneumothorax.  2. No other change since previous study.     This report was finalized on 3/7/2018 7:24 AM by Dr. Ace Serra MD.             Lab Results:     Lab Results (last 24 hours)     Procedure Component Value Units Date/Time    POC Glucose Once [[id number]]  (Normal) Collected:  03/06/18 1656    Specimen:  Blood Updated:  03/06/18 1658     Glucose 91 mg/dL     Narrative:       Meter: [id number] : [id number] Krissy TRUJILLO    POC Glucose Once [[id number]]  (Abnormal) Collected:  03/06/18 1914    Specimen:  Blood Updated:  03/06/18 1916     Glucose 188 (H) mg/dL     Narrative:       Meter: [id number] : [id number] Vinnie Chua CNA    POC Glucose Once [[id number]]  (Normal) Collected:  03/07/18 0740    Specimen:  Blood Updated:  03/07/18 0742     Glucose 99 mg/dL     Narrative:       Meter: [id number] : [id number] Rosetta TRUJILLO    POC Glucose Once [[id number]]  (Normal) Collected:  03/07/18 1119    Specimen:  Blood Updated:  03/07/18 1121     Glucose 96 mg/dL     Narrative:       Meter: [id number] : [id number] Rosetta TRUJILLO           Assessment/Plan     Active Problems:    COPD exacerbation       Assessment:    Condition: In stable condition.  Improving.   (Left recurrent spontaneous pneumothorax).     Plan:   (Repeat chest x-ray this morning shows no evidence of pneumothorax after pleural catheter was placed.  He has no airleak noted from chest tube.  He has no complaints of pain.  No worsening shortness of breath.  Currently maintaining O2 sats greater than 90% on O2 at 2 L/m.  We will proceed with bedside pleurodesis.  Explained  procedure to patient including risk of injury to lung and risk of infection.  Benefits include prevention of recurrence of the pneumothorax and prevent the need for surgery.  Patient agrees to proceed with procedure as recommended.).       MARTIR Zaldivar  [facility]  03/07/18  12:18 PM      Procedure     Consent obtained for left bedside pleuradesis.  Time out completed confirming correct patient, site, and procedure.  Patient was premedicated with Dilaudid 0.5 mg IV.  Cardiac monitoring in place.  Pulse oximetry in place.  Currently on O2 at 2 L/m per nasal cannula.  250 ML's of doxycycline along with 20 ML's of 1% lidocaine was administered through left pleural catheter into the intrapleural space.  Patient tolerated procedure without difficulty except initially had problems breathing and felt unable to breath however he maintained his O2 sats greater 90% on 2 L of O2.  Reassured and instructed to take in deep breaths.  Patient's condition quickly improved.  He had no complaints of pain and no worsening shortness breath once procedure was completed.  Chest tube was clamped and positioning schedule was initiated.  Once turning schedule completed after 3 hours, will unclamp chest tube and place back to -20 cm suction.  Repeat CXR in am.  Continue pain management and oxygen therapy.  Close monitoring.  Updated daughter on patient's condition.    MARTIR Zaldivar  3/7/2018  12:27pm

## 2018-03-07 NOTE — PLAN OF CARE
Problem: Patient Care Overview (Adult)  Goal: Plan of Care Review  Outcome: Ongoing (interventions implemented as appropriate)   03/07/18 0434   Coping/Psychosocial Response Interventions   Plan Of Care Reviewed With patient   Patient Care Overview   Progress no change   Outcome Evaluation   Outcome Summary/Follow up Plan oxygen at 2 liters chest tube in place no air leak 20 cm suctionpain controlled       Problem: COPD, Chronic Bronchitis/Emphysema (Adult)  Goal: Signs and Symptoms of Listed Potential Problems Will be Absent or Manageable (COPD, Chronic Bronchitis/Emphysema)  Outcome: Ongoing (interventions implemented as appropriate)   03/07/18 0434   COPD, Chronic Bronchitis/Emphysema   Problems Assessed (COPD, Chronic Bronchitis/Emphysema) all   Problems Present (COPD, Chronic Bronchitis/Emphysema) hypoxia/hypoxemia;situational response       Problem: Respiratory Insufficiency (Adult)  Goal: Identify Related Risk Factors and Signs and Symptoms  Outcome: Ongoing (interventions implemented as appropriate)   03/07/18 0434   Respiratory Insufficiency   Related Risk Factors (Respiratory Insufficiency) medication effects;pain   Signs and Symptoms (Respiratory Insufficiency) cough (productive/ineffective)       Problem: Fluid Volume Deficit (Adult)  Goal: Identify Related Risk Factors and Signs and Symptoms  Outcome: Ongoing (interventions implemented as appropriate)   03/07/18 0434   Fluid Volume Deficit   Fluid Volume Deficit: Related Risk Factors age extremes;medication effects;pain   Signs and Symptoms (Fluid Volume Deficit) other (see comments)  (none)       Problem: Infection, Risk/Actual (Adult)  Goal: Identify Related Risk Factors and Signs and Symptoms   03/07/18 0434   Infection, Risk/Actual   Infection, Risk/Actual: Related Risk Factors age extremes;chronic illness/condition;tissue perfusion altered   Signs and Symptoms (Infection, Risk/Actual) blood glucose changes;pain       Problem: Chest Tube Drainage  Device (Adult)  Goal: Signs and Symptoms of Listed Potential Problems Will be Absent or Manageable (Chest Tube Drainage Device)  Outcome: Ongoing (interventions implemented as appropriate)   03/07/18 9248   Chest Tube Drainage Device   Problems Assessed (Chest Tube Drainage Device) all   Problems Present (Chest Tube Drainage Device) pain

## 2018-03-07 NOTE — PROGRESS NOTES
"                                              LOS: 3 days   Patient Care Team:  Adam Feliz MD as PCP - General (Internal Medicine)    Chief Complaint:  Follow-up on COPD, no pneumothorax and medical problems listed below    Interval History:   Talc infused today.  Patient experienced pleuritic chest pain afterward.  He does not ask for pain medication according to his family         Ventilator/Non-Invasive Ventilation Settings     None            Vital Signs  Temp:  [97.5 °F (36.4 °C)-98.1 °F (36.7 °C)] 97.5 °F (36.4 °C)  Heart Rate:  [68-94] 78  Resp:  [16-20] 20  BP: (119-164)/(73-86) 138/73    Intake/Output Summary (Last 24 hours) at 03/07/18 1502  Last data filed at 03/07/18 1345   Gross per 24 hour   Intake          1741.67 ml   Output             1050 ml   Net           691.67 ml     Flowsheet Rows         First Filed Value    Admission Height  165.1 cm (65\") Documented at 03/04/2018 1736    Admission Weight  52.2 kg (115 lb) Documented at 03/04/2018 1738          Physical Exam:   General Appearance:    Alert, cooperative, in no acute distress   Lungs:     Significantly decreased air entry throughout.  No wheezing or crackles     Heart:    Regular rhythm and normal rate, normal S1 and S2, no            murmur, no gallop, no rub, no click   Chest Wall:    No abnormalities observed   Abdomen:     Soft.  No tenderness    Neuro:   Conscious, alert, oriented x3   Extremities:   Moves all extremities well, no edema, no cyanosis, no             Redness          Results Review:          Results from last 7 days  Lab Units 03/05/18  0435 03/04/18  1744   SODIUM mmol/L 138 138   POTASSIUM mmol/L 4.7 5.1   CHLORIDE mmol/L 102 99   CO2 mmol/L 22.5 25.3   BUN mg/dL 18 18   CREATININE mg/dL 1.19 1.59*   GLUCOSE mg/dL 123* 102*   CALCIUM mg/dL 8.9 9.3       Results from last 7 days  Lab Units 03/04/18  1744   TROPONIN T ng/mL <0.010       Results from last 7 days  Lab Units 03/04/18  1744   WBC 10*3/mm3 8.68 "   HEMOGLOBIN g/dL 12.3*   HEMATOCRIT % 38.0*   PLATELETS 10*3/mm3 212       Results from last 7 days  Lab Units 03/04/18  1744   INR  1.01       Results from last 7 days  Lab Units 03/04/18  1744   PROBNP pg/mL 238.5       I reviewed the patient's new clinical results.  I personally viewed and interpreted the patient's CXR        Medication Review:     aspirin 81 mg Oral Daily   atorvastatin 10 mg Oral Daily   brimonidine-timolol 1 drop Both Eyes Q12H   docusate sodium 100 mg Oral Daily   insulin aspart 0-9 Units Subcutaneous 4x Daily With Meals & Nightly   ipratropium-albuterol 3 mL Nebulization Q8H - RT   ketorolac 15 mg Intramuscular Once   methylPREDNISolone sodium succinate 40 mg Intravenous Q12H   pantoprazole 40 mg Oral Q AM   tamsulosin 0.4 mg Oral Daily         sodium chloride 100 mL/hr Last Rate: 100 mL/hr (03/07/18 1318)       Diagnostic imaging:  I personally and independently reviewed theFollowing images:  Since the last chest x-ray in December, there is interval development of left pneumothorax               Assessment/Plan   1. Shortness of breath, secondary to the problems listed below  2. Recurrent left sided pneumothorax, spontaneous.  First event occurred and October and treated with a chest tube. S/P small bore chest tube insertion on 3/6/18, talc pleurodhesis 3/7/18  3. Acute exacerbation of COPD, moderate COPD with moderate emphysema at baseline  4. BON on CKD, improved  5. 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 Sinus tachycardia on EKG  7. Macular degeneration with difficulties using his inhaler  8. Left sided Chest pain related to the chest tube insertion    Chest tube management per thoracic surgery.  Noted the infusion of polyp which was already done.  Patient experienced some pain afterward.  I did encourage him to ask for pain medications.  I will add an incentive spirometry for him to avoid atelectasis.  Discussed with family at length.    Am labs.    Arrange  for nebs at home    Discussed with the patient, family and staff      Benji Huynh MD  03/07/18  3:02 PM          This note was dictated utilizing Dragon dictation

## 2018-03-08 ENCOUNTER — APPOINTMENT (OUTPATIENT)
Dept: GENERAL RADIOLOGY | Facility: HOSPITAL | Age: 83
End: 2018-03-08

## 2018-03-08 LAB
ANION GAP SERPL CALCULATED.3IONS-SCNC: 9.6 MMOL/L
BUN BLD-MCNC: 18 MG/DL (ref 8–23)
BUN/CREAT SERPL: 18.6 (ref 7–25)
CALCIUM SPEC-SCNC: 8.1 MG/DL (ref 8.6–10.5)
CHLORIDE SERPL-SCNC: 106 MMOL/L (ref 98–107)
CO2 SERPL-SCNC: 23.4 MMOL/L (ref 22–29)
CREAT BLD-MCNC: 0.97 MG/DL (ref 0.76–1.27)
GFR SERPL CREATININE-BSD FRML MDRD: 74 ML/MIN/1.73
GLUCOSE BLD-MCNC: 119 MG/DL (ref 65–99)
GLUCOSE BLDC GLUCOMTR-MCNC: 102 MG/DL (ref 70–130)
GLUCOSE BLDC GLUCOMTR-MCNC: 107 MG/DL (ref 70–130)
GLUCOSE BLDC GLUCOMTR-MCNC: 113 MG/DL (ref 70–130)
GLUCOSE BLDC GLUCOMTR-MCNC: 122 MG/DL (ref 70–130)
POTASSIUM BLD-SCNC: 5 MMOL/L (ref 3.5–5.2)
SODIUM BLD-SCNC: 139 MMOL/L (ref 136–145)

## 2018-03-08 PROCEDURE — 94799 UNLISTED PULMONARY SVC/PX: CPT

## 2018-03-08 PROCEDURE — 82962 GLUCOSE BLOOD TEST: CPT

## 2018-03-08 PROCEDURE — 99231 SBSQ HOSP IP/OBS SF/LOW 25: CPT | Performed by: NURSE PRACTITIONER

## 2018-03-08 PROCEDURE — 25010000002 METHYLPREDNISOLONE PER 40 MG: Performed by: INTERNAL MEDICINE

## 2018-03-08 PROCEDURE — 80048 BASIC METABOLIC PNL TOTAL CA: CPT | Performed by: INTERNAL MEDICINE

## 2018-03-08 PROCEDURE — 71045 X-RAY EXAM CHEST 1 VIEW: CPT

## 2018-03-08 RX ADMIN — IPRATROPIUM BROMIDE AND ALBUTEROL SULFATE 3 ML: .5; 3 SOLUTION RESPIRATORY (INHALATION) at 07:17

## 2018-03-08 RX ADMIN — ATORVASTATIN CALCIUM 10 MG: 10 TABLET, FILM COATED ORAL at 09:30

## 2018-03-08 RX ADMIN — PANTOPRAZOLE SODIUM 40 MG: 40 TABLET, DELAYED RELEASE ORAL at 05:50

## 2018-03-08 RX ADMIN — IPRATROPIUM BROMIDE AND ALBUTEROL SULFATE 3 ML: .5; 3 SOLUTION RESPIRATORY (INHALATION) at 21:29

## 2018-03-08 RX ADMIN — BRIMONIDINE TARTRATE, TIMOLOL MALEATE 1 DROP: 2; 5 SOLUTION/ DROPS OPHTHALMIC at 21:49

## 2018-03-08 RX ADMIN — METHYLPREDNISOLONE SODIUM SUCCINATE 40 MG: 40 INJECTION, POWDER, FOR SOLUTION INTRAMUSCULAR; INTRAVENOUS at 21:49

## 2018-03-08 RX ADMIN — BRIMONIDINE TARTRATE, TIMOLOL MALEATE 1 DROP: 2; 5 SOLUTION/ DROPS OPHTHALMIC at 09:31

## 2018-03-08 RX ADMIN — DOCUSATE SODIUM 100 MG: 100 CAPSULE, LIQUID FILLED ORAL at 09:34

## 2018-03-08 RX ADMIN — IPRATROPIUM BROMIDE AND ALBUTEROL SULFATE 3 ML: .5; 3 SOLUTION RESPIRATORY (INHALATION) at 14:32

## 2018-03-08 RX ADMIN — ASPIRIN 81 MG: 81 TABLET, CHEWABLE ORAL at 09:30

## 2018-03-08 RX ADMIN — METHYLPREDNISOLONE SODIUM SUCCINATE 40 MG: 40 INJECTION, POWDER, FOR SOLUTION INTRAMUSCULAR; INTRAVENOUS at 09:30

## 2018-03-08 RX ADMIN — TAMSULOSIN HYDROCHLORIDE 0.4 MG: 0.4 CAPSULE ORAL at 09:30

## 2018-03-08 NOTE — PLAN OF CARE
Problem: Patient Care Overview (Adult)  Goal: Plan of Care Review  Outcome: Ongoing (interventions implemented as appropriate)   03/07/18 1815   Coping/Psychosocial Response Interventions   Plan Of Care Reviewed With patient   Patient Care Overview   Progress progress towards functional goals is fair   Outcome Evaluation   Outcome Summary/Follow up Plan VSS, continue to monitor, chest tube care, ambulate with assist, oxygen as needed      Goal: Adult Individualization and Mutuality  Outcome: Ongoing (interventions implemented as appropriate)   03/07/18 1815   Individualization   Patient Specific Preferences no preferences made by patient at this time    Patient Specific Goals pain controlled, mobility improved, oxygen weaned if tolerated, lung function improved    Patient Specific Interventions pain meds prn, monitor vitals/02 sats, ambulate with assist, chest tube care    Mutuality/Individual Preferences   What Anxieties, Fears or Concerns Do You Have About Your Health or Care? length of hospital stay, pain control, oxygen usage, chest tube maintenence    What Questions Do You Have About Your Health or Care? How long will I be here? How long will I have chest tube?    What Information Would Help Us Give You More Personalized Care? Keep patient updated on plan of care, new tx orders, meds ordered, possible d/c date      Goal: Discharge Needs Assessment  Outcome: Ongoing (interventions implemented as appropriate)   03/07/18 1815   Discharge Needs Assessment   Concerns To Be Addressed adjustment to diagnosis/illness concerns;compliance issue concerns;coping/stress concerns   Readmission Within The Last 30 Days no previous admission in last 30 days   Equipment Needed After Discharge none   Current Discharge Risk dependent with mobility/activities of daily living   Discharge Disposition still a patient   Discharge Planning Comments plans on going home with family assistance after discharge    Current Health    Anticipated Changes Related to Illness none   Self-Care   Equipment Currently Used at Home none   Living Environment   Transportation Available car;family or friend will provide       Problem: COPD, Chronic Bronchitis/Emphysema (Adult)  Goal: Signs and Symptoms of Listed Potential Problems Will be Absent or Manageable (COPD, Chronic Bronchitis/Emphysema)  Outcome: Ongoing (interventions implemented as appropriate)   03/07/18 1815   COPD, Chronic Bronchitis/Emphysema   Problems Assessed (COPD, Chronic Bronchitis/Emphysema) all   Problems Present (COPD, Chronic Bronchitis/Emphysema) hypoxia/hypoxemia;situational response       Problem: Respiratory Insufficiency (Adult)  Goal: Identify Related Risk Factors and Signs and Symptoms  Outcome: Ongoing (interventions implemented as appropriate)   03/07/18 1815   Respiratory Insufficiency   Related Risk Factors (Respiratory Insufficiency) medication effects;pain;knowledge deficit   Signs and Symptoms (Respiratory Insufficiency) cough (productive/ineffective);breathing pattern changes     Goal: Acid/Base Balance  Outcome: Ongoing (interventions implemented as appropriate)   03/07/18 1815   Respiratory Insufficiency (Adult)   Acid/Base Balance making progress toward outcome     Goal: Effective Ventilation  Outcome: Ongoing (interventions implemented as appropriate)   03/07/18 1815   Respiratory Insufficiency (Adult)   Effective Ventilation making progress toward outcome       Problem: Fluid Volume Deficit (Adult)  Goal: Identify Related Risk Factors and Signs and Symptoms  Outcome: Ongoing (interventions implemented as appropriate)   03/07/18 1815   Fluid Volume Deficit   Fluid Volume Deficit: Related Risk Factors age extremes;medication effects;pain   Signs and Symptoms (Fluid Volume Deficit) other (see comments)  (none )     Goal: Fluid/Electrolyte Balance  Outcome: Ongoing (interventions implemented as appropriate)   03/07/18 1815   Fluid Volume Deficit (Adult)    Fluid/Electrolyte Balance making progress toward outcome     Goal: Comfort/Well Being  Outcome: Ongoing (interventions implemented as appropriate)   03/07/18 1815   Fluid Volume Deficit (Adult)   Comfort/Well Being making progress toward outcome       Problem: Infection, Risk/Actual (Adult)  Goal: Identify Related Risk Factors and Signs and Symptoms  Outcome: Ongoing (interventions implemented as appropriate)    Goal: Infection Prevention/Resolution  Outcome: Ongoing (interventions implemented as appropriate)   03/07/18 1815   Infection, Risk/Actual (Adult)   Infection Prevention/Resolution making progress toward outcome       Problem: Chest Tube Drainage Device (Adult)  Goal: Signs and Symptoms of Listed Potential Problems Will be Absent or Manageable (Chest Tube Drainage Device)  Outcome: Ongoing (interventions implemented as appropriate)   03/07/18 1815   Chest Tube Drainage Device   Problems Assessed (Chest Tube Drainage Device) all   Problems Present (Chest Tube Drainage Device) pain

## 2018-03-08 NOTE — PLAN OF CARE
Problem: Chest Tube Drainage Device (Adult)  Goal: Signs and Symptoms of Listed Potential Problems Will be Absent or Manageable (Chest Tube Drainage Device)  Outcome: Ongoing (interventions implemented as appropriate)

## 2018-03-08 NOTE — PROGRESS NOTES
Continued Stay Note  [facility]     Patient Name: Louis Villalba  MRN: [id number]  Today's Date: 3/8/2018    Admit Date: 3/4/2018          Discharge Plan       03/08/18 1731    Case Management/Social Work Plan    Plan Home, pt has no DME company preference if needed    Patient/Family In Agreement With Plan yes    Additional Comments CCP met with pt to confirm d/c plan. Pt confirms plan to return home upon d/c and anticipates only possible nebulizer need. Pt states no preference for DME supplier. CCP to follow to assist with needs that arise. Wendy Becker LCSW              Discharge Codes     None            Karen Becker LCSW

## 2018-03-08 NOTE — PROGRESS NOTES
"                                              LOS: 4 days   Patient Care Team:  Adam Feliz MD as PCP - General (Internal Medicine)    Chief Complaint:  Follow-up on COPD, no pneumothorax and medical problems listed below    Interval History:   Pain is controlled.  He is coughing some yellow phlegm now.         Ventilator/Non-Invasive Ventilation Settings     None            Vital Signs  Temp:  [97.6 °F (36.4 °C)-98.1 °F (36.7 °C)] 97.7 °F (36.5 °C)  Heart Rate:  [] 102  Resp:  [16-24] 18  BP: (121-176)/(69-91) 176/85    Intake/Output Summary (Last 24 hours) at 03/08/18 1653  Last data filed at 03/08/18 1646   Gross per 24 hour   Intake             1220 ml   Output             1725 ml   Net             -505 ml     Flowsheet Rows         First Filed Value    Admission Height  165.1 cm (65\") Documented at 03/04/2018 1736    Admission Weight  52.2 kg (115 lb) Documented at 03/04/2018 1738          Physical Exam:   General Appearance:    Alert, cooperative, in no acute distress   Lungs:     Significantly decreased air entry throughout.  No wheezing or crackles     Heart:    Regular rhythm and normal rate, normal S1 and S2, no            murmur, no gallop, no rub, no click   Chest Wall:    No abnormalities observed   Abdomen:     Soft.  No tenderness    Neuro:   Conscious, alert, oriented x3   Extremities:   Moves all extremities well, no edema, no cyanosis, no             Redness          Results Review:          Results from last 7 days  Lab Units 03/08/18  0407 03/05/18  0435 03/04/18  1744   SODIUM mmol/L 139 138 138   POTASSIUM mmol/L 5.0 4.7 5.1   CHLORIDE mmol/L 106 102 99   CO2 mmol/L 23.4 22.5 25.3   BUN mg/dL 18 18 18   CREATININE mg/dL 0.97 1.19 1.59*   GLUCOSE mg/dL 119* 123* 102*   CALCIUM mg/dL 8.1* 8.9 9.3       Results from last 7 days  Lab Units 03/04/18  1744   TROPONIN T ng/mL <0.010       Results from last 7 days  Lab Units 03/04/18  1744   WBC 10*3/mm3 8.68   HEMOGLOBIN g/dL 12.3* "   HEMATOCRIT % 38.0*   PLATELETS 10*3/mm3 212       Results from last 7 days  Lab Units 03/04/18  1744   INR  1.01       Results from last 7 days  Lab Units 03/04/18  1744   PROBNP pg/mL 238.5       I reviewed the patient's new clinical results.  I personally viewed and interpreted the patient's CXR        Medication Review:     aspirin 81 mg Oral Daily   atorvastatin 10 mg Oral Daily   brimonidine-timolol 1 drop Both Eyes Q12H   docusate sodium 100 mg Oral Daily   insulin aspart 0-9 Units Subcutaneous 4x Daily With Meals & Nightly   ipratropium-albuterol 3 mL Nebulization Q8H - RT   ketorolac 15 mg Intramuscular Once   methylPREDNISolone sodium succinate 40 mg Intravenous Q12H   pantoprazole 40 mg Oral Q AM   tamsulosin 0.4 mg Oral Daily            Diagnostic imaging:  I personally and independently reviewed theFollowing images:  Since the last chest x-ray in December, there is interval development of left pneumothorax               Assessment/Plan   1. Shortness of breath, secondary to the problems listed below  2. Recurrent left sided pneumothorax, spontaneous.  First event occurred and October and treated with a chest tube. S/P small bore chest tube insertion on 3/6/18, talc pleurodhesis 3/7/18  3. Acute exacerbation of COPD, moderate COPD with moderate emphysema at baseline  4. BON on CKD, improved  5. 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 Sinus tachycardia on EKG  7. Macular degeneration with difficulties using his inhaler  8. Left sided Chest pain related to the chest tube insertion    No air leak.  Chest tube on water seal.  Repeat CXR in a.m. and if no pneumothorax then tube to be removed.  This is managed by thoracic surgery.  Continue pain control.  Patient encouraged to use incentive spirometry    Arrange for nebs at home    Discussed with the patient, family and staff      Benji Huynh MD  03/08/18  4:53 PM          This note was dictated utilizing Dragon dictation

## 2018-03-08 NOTE — PROGRESS NOTES
"    Chief Complaint   Patient presents with   • Shortness of Breath     history of collapsed lung and patient feels the same way today        Consulted for: Recurrent left spontaneous pneumothorax  S/P: Chest tube placement under fluoro/ Bedside pleuradesis  POD # 2/1    Subjective:  Symptoms:  Stable.  No shortness of breath.  (Condition improved this morning.  He has no complaints of pain.  No worsening shortness of breath.).        Vital Signs:  Temp:  [97.5 °F (36.4 °C)-98.5 °F (36.9 °C)] 98.1 °F (36.7 °C)  Heart Rate:  [73-93] 87  Resp:  [16-24] 24  BP: (121-161)/(69-91) 161/91    Intake & Output (last day)       03/07 0701 - 03/08 0700 03/08 0701 - 03/09 0700    P.O. 540     I.V. (mL/kg) 600 (13.4)     IV Piggyback 250     Total Intake(mL/kg) 1390 (31)     Urine (mL/kg/hr) 1750 (1.6)     Other 0 (0)     Stool 0 (0)     Total Output 1750      Net -360            Unmeasured Stool Occurrence 0 x           Objective:  General Appearance:  Comfortable and in no acute distress (Sitting up in chair).    Vital signs: (most recent): Blood pressure 161/91, pulse 87, temperature 98.1 °F (36.7 °C), temperature source Oral, resp. rate 24, height 165.1 cm (65\"), weight 44.8 kg (98 lb 12.8 oz), SpO2 97 %.  Vital signs are normal.  No fever.    Lungs:  Normal respiratory rate and normal effort.  There are decreased breath sounds.    Heart: Normal rate.  Regular rhythm.  No murmur.   Abdomen: Abdomen is soft and non-distended.    Extremities: Normal range of motion.  There is no dependent edema.    Neurological: Patient is alert and oriented to person, place and time.    Skin:  Warm and dry.              Chest tube:   Site: Left, Clean, Dry and Intact  Suction: -20 cm  Air Leak: negative  Level: 64  24 Hour Total: 0    Results Review:     I reviewed the patient's new clinical results.    Imaging Results (last 24 hours)     Procedure Component Value Units Date/Time    XR Chest 1 View [[id number]] Collected:  03/08/18 0754     " Updated:  03/08/18 0754    Narrative:       CLINICAL HISTORY: 85-year-old male with asbestosis, spontaneous left  pneumothorax treated with placement of pigtail type left chest tube and  reported talc pleurodesis.     PORTABLE AP SEMIERECT CHEST 03/08/2018 AT 0542 HOURS.     FINDINGS: When compared to the exam dated 03/07/2018 at 0558 hours, the  pigtail type left chest catheter remains at the superolateral left  chest, and no large pneumothorax is seen. Minimal if any trace of apical  left pneumothorax is seen on the current exam. There is interval  development of patchy to dense opacity in the left lower lobe  distribution. Hazy trace blunting of left costophrenic angle is present  on the current exam and does not exclude trace pleural fluid. The right  costophrenic angle is sharp. The cardiac silhouette is normal in  caliber. Aortic uncoiling with calcification is demonstrated. Oxygen  cannula tubing projects about the neck and right axilla. Monitoring lead  wires are present. Degenerative changes in the bony thorax are noted.  Multifocal calcifications about the right and left chest are again  demonstrated and were found to include both partially calcified pleural  plaques and pulmonary calcifications on CT scan of the chest dated  11/08/2017.     CONCLUSION: Minimal if any trace left apical pneumothorax with pigtail  type left chest tube present. Development of trace hazy blunting of left  costophrenic angle and fairly extensive hazy to dense opacity in the  lower left lung zones.       XR Chest PA & Lateral [[id number]] Collected:  03/06/18 1447     Updated:  03/08/18 0807    Narrative:       FLUOROSCOPICALLY GUIDED LEFT CHEST TUBE PLACEMENT     HISTORY: Male who is 85 years-old, with a history of recent spontaneous  left pneumothorax presenting for left-sided chest tube placement.      The procedure, including the risk of hemorrhage, infection, diagnostic  or technical failure, was discussed with the patient  prior to  examination and consent was obtained.     PROCEDURE:     Fluoroscopically guided imaging was performed and skin marker was  placed.     The patient was prepped and draped in the usual fashion. Following  adequate local anesthesia with 1% lidocaine, dermatotomy was placed and  needle placement performed.     Utilizing real-time fluoroscopic guidance, guidewire was advanced  through the needle into the pleural cavity and needle was removed. The  tract was dilated and subsequent 14 Persian Kenny catheter was advanced  over the guidewire. Guidewire was removed.     Pneumothorax was evacuated and catheter was sutured in position with  standard technique and 1% Xylocaine.     Tube was attached to waterseal for drainage.     Follow-up imaging demonstrates no complicating feature.       Impression:       Satisfactory uneventful fluoroscopically guided chest tube  placement for treatment of left pneumothorax as described above.     FLUOROSCOPY TIME: 32 seconds, single image           TWO-VIEW CHEST     HISTORY: 85-year-old male with spontaneous left pneumothorax status post  fluoroscopic guided left chest tube placement     COMPARISON: Preop imaging of 3 of 518 which demonstrated 2.3 cm left  upper pneumothorax     FINDINGS:  1. 3 mm residual left apical pneumothorax with Kenny chest tube in good  position.  2. No other interval change.     This report was finalized on 3/6/2018 2:51 PM by Dr. Ace Serra MD.       IR Guided Chest Tube Placement [[id number]] Collected:  03/06/18 1447     Updated:  03/08/18 0807    Narrative:       FLUOROSCOPICALLY GUIDED LEFT CHEST TUBE PLACEMENT     HISTORY: Male who is 85 years-old, with a history of recent spontaneous  left pneumothorax presenting for left-sided chest tube placement.      The procedure, including the risk of hemorrhage, infection, diagnostic  or technical failure, was discussed with the patient prior to  examination and consent was obtained.     PROCEDURE:      Fluoroscopically guided imaging was performed and skin marker was  placed.     The patient was prepped and draped in the usual fashion. Following  adequate local anesthesia with 1% lidocaine, dermatotomy was placed and  needle placement performed.     Utilizing real-time fluoroscopic guidance, guidewire was advanced  through the needle into the pleural cavity and needle was removed. The  tract was dilated and subsequent 14 Burmese Kenny catheter was advanced  over the guidewire. Guidewire was removed.     Pneumothorax was evacuated and catheter was sutured in position with  standard technique and 1% Xylocaine.     Tube was attached to waterseal for drainage.     Follow-up imaging demonstrates no complicating feature.       Impression:       Satisfactory uneventful fluoroscopically guided chest tube  placement for treatment of left pneumothorax as described above.     FLUOROSCOPY TIME: 32 seconds, single image           TWO-VIEW CHEST     HISTORY: 85-year-old male with spontaneous left pneumothorax status post  fluoroscopic guided left chest tube placement     COMPARISON: Preop imaging of 3 of 518 which demonstrated 2.3 cm left  upper pneumothorax     FINDINGS:  1. 3 mm residual left apical pneumothorax with Kenny chest tube in good  position.  2. No other interval change.     This report was finalized on 3/6/2018 2:51 PM by Dr. Ace Serra MD.             Lab Results:     Lab Results (last 24 hours)     Procedure Component Value Units Date/Time    POC Glucose Once [[id number]]  (Normal) Collected:  03/07/18 1119    Specimen:  Blood Updated:  03/07/18 1121     Glucose 96 mg/dL     Narrative:       Meter: [id number] : [id number] Rosetta Swati NA    POC Glucose Once [[id number]]  (Normal) Collected:  03/07/18 1609    Specimen:  Blood Updated:  03/07/18 1624     Glucose 125 mg/dL     Narrative:       Meter: [id number] : [id number] Pete Boobharat NA    POC Glucose Once [[id number]]  (Abnormal) Collected:  03/07/18  2052    Specimen:  Blood Updated:  03/07/18 2053     Glucose 132 (H) mg/dL     Narrative:       Meter: [id number] : [id number] Samreen TRUJILLO    Basic Metabolic Panel [[id number]]  (Abnormal) Collected:  03/08/18 0407    Specimen:  Blood Updated:  03/08/18 0503     Glucose 119 (H) mg/dL      BUN 18 mg/dL      Creatinine 0.97 mg/dL      Sodium 139 mmol/L      Potassium 5.0 mmol/L      Chloride 106 mmol/L      CO2 23.4 mmol/L      Calcium 8.1 (L) mg/dL      eGFR Non African Amer 74 mL/min/1.73      BUN/Creatinine Ratio 18.6     Anion Gap 9.6 mmol/L     Narrative:       The MDRD GFR formula is only valid for adults with stable renal function between ages 18 and 70.    POC Glucose Once [[id number]]  (Normal) Collected:  03/08/18 0735    Specimen:  Blood Updated:  03/08/18 0737     Glucose 107 mg/dL     Narrative:       Meter: [id number] : [id number] Rosetta Longoria RONNIE           Assessment/Plan     Active Problems:    COPD exacerbation       Assessment:    Condition: In stable condition.  Improving.       Plan:   (Chest x-ray stable this morning with no evidence of pneumothorax.  He has no airleak noted from chest tube.  Chest tube to waterseal today.  Repeat chest x-ray in the a.m.  Continue pain management, pulmonary hygiene.).       Tracey Quiroga, APRN  [facility]  03/08/18  8:12 AM

## 2018-03-09 ENCOUNTER — APPOINTMENT (OUTPATIENT)
Dept: GENERAL RADIOLOGY | Facility: HOSPITAL | Age: 83
End: 2018-03-09

## 2018-03-09 LAB
GLUCOSE BLDC GLUCOMTR-MCNC: 103 MG/DL (ref 70–130)
GLUCOSE BLDC GLUCOMTR-MCNC: 113 MG/DL (ref 70–130)
GLUCOSE BLDC GLUCOMTR-MCNC: 98 MG/DL (ref 70–130)
GLUCOSE BLDC GLUCOMTR-MCNC: 99 MG/DL (ref 70–130)

## 2018-03-09 PROCEDURE — 94799 UNLISTED PULMONARY SVC/PX: CPT

## 2018-03-09 PROCEDURE — 71045 X-RAY EXAM CHEST 1 VIEW: CPT

## 2018-03-09 PROCEDURE — 25010000002 METHYLPREDNISOLONE PER 40 MG: Performed by: INTERNAL MEDICINE

## 2018-03-09 PROCEDURE — 82962 GLUCOSE BLOOD TEST: CPT

## 2018-03-09 PROCEDURE — 99231 SBSQ HOSP IP/OBS SF/LOW 25: CPT | Performed by: NURSE PRACTITIONER

## 2018-03-09 PROCEDURE — 94762 N-INVAS EAR/PLS OXIMTRY CONT: CPT

## 2018-03-09 RX ORDER — POLYETHYLENE GLYCOL 3350 17 G/17G
17 POWDER, FOR SOLUTION ORAL DAILY
Status: DISCONTINUED | OUTPATIENT
Start: 2018-03-09 | End: 2018-03-10 | Stop reason: HOSPADM

## 2018-03-09 RX ORDER — ALBUTEROL SULFATE 0.63 MG/3ML
1 SOLUTION RESPIRATORY (INHALATION) EVERY 6 HOURS PRN
Qty: 360 ML | Refills: 2 | Status: SHIPPED | OUTPATIENT
Start: 2018-03-09 | End: 2020-01-27

## 2018-03-09 RX ADMIN — IPRATROPIUM BROMIDE AND ALBUTEROL SULFATE 3 ML: .5; 3 SOLUTION RESPIRATORY (INHALATION) at 22:21

## 2018-03-09 RX ADMIN — METHYLPREDNISOLONE SODIUM SUCCINATE 40 MG: 40 INJECTION, POWDER, FOR SOLUTION INTRAMUSCULAR; INTRAVENOUS at 08:09

## 2018-03-09 RX ADMIN — BRIMONIDINE TARTRATE, TIMOLOL MALEATE 1 DROP: 2; 5 SOLUTION/ DROPS OPHTHALMIC at 09:45

## 2018-03-09 RX ADMIN — IPRATROPIUM BROMIDE AND ALBUTEROL SULFATE 3 ML: .5; 3 SOLUTION RESPIRATORY (INHALATION) at 07:01

## 2018-03-09 RX ADMIN — DOCUSATE SODIUM 100 MG: 100 CAPSULE, LIQUID FILLED ORAL at 08:09

## 2018-03-09 RX ADMIN — BRIMONIDINE TARTRATE, TIMOLOL MALEATE 1 DROP: 2; 5 SOLUTION/ DROPS OPHTHALMIC at 21:52

## 2018-03-09 RX ADMIN — POLYETHYLENE GLYCOL 3350 17 G: 17 POWDER, FOR SOLUTION ORAL at 11:35

## 2018-03-09 RX ADMIN — ASPIRIN 81 MG: 81 TABLET, CHEWABLE ORAL at 08:09

## 2018-03-09 RX ADMIN — TAMSULOSIN HYDROCHLORIDE 0.4 MG: 0.4 CAPSULE ORAL at 08:09

## 2018-03-09 RX ADMIN — PANTOPRAZOLE SODIUM 40 MG: 40 TABLET, DELAYED RELEASE ORAL at 06:52

## 2018-03-09 RX ADMIN — IPRATROPIUM BROMIDE AND ALBUTEROL SULFATE 3 ML: .5; 3 SOLUTION RESPIRATORY (INHALATION) at 14:02

## 2018-03-09 RX ADMIN — ATORVASTATIN CALCIUM 10 MG: 10 TABLET, FILM COATED ORAL at 08:09

## 2018-03-09 NOTE — PLAN OF CARE
Problem: Patient Care Overview (Adult)  Goal: Plan of Care Review  Outcome: Ongoing (interventions implemented as appropriate)   03/09/18 1358   Coping/Psychosocial Response Interventions   Plan Of Care Reviewed With patient   Patient Care Overview   Progress improving   Outcome Evaluation   Outcome Summary/Follow up Plan patient vss. chest tube clamped today. pain well controlled. will monitor.       Problem: COPD, Chronic Bronchitis/Emphysema (Adult)  Goal: Signs and Symptoms of Listed Potential Problems Will be Absent or Manageable (COPD, Chronic Bronchitis/Emphysema)  Outcome: Ongoing (interventions implemented as appropriate)   03/09/18 1358   COPD, Chronic Bronchitis/Emphysema   Problems Assessed (COPD, Chronic Bronchitis/Emphysema) all   Problems Present (COPD, Chronic Bronchitis/Emphysema) none       Problem: Fluid Volume Deficit (Adult)  Goal: Fluid/Electrolyte Balance  Outcome: Ongoing (interventions implemented as appropriate)   03/09/18 1358   Fluid Volume Deficit (Adult)   Fluid/Electrolyte Balance making progress toward outcome     Goal: Comfort/Well Being  Outcome: Ongoing (interventions implemented as appropriate)   03/09/18 1358   Fluid Volume Deficit (Adult)   Comfort/Well Being making progress toward outcome       Problem: Chest Tube Drainage Device (Adult)  Goal: Signs and Symptoms of Listed Potential Problems Will be Absent or Manageable (Chest Tube Drainage Device)  Outcome: Ongoing (interventions implemented as appropriate)   03/09/18 1358   Chest Tube Drainage Device   Problems Assessed (Chest Tube Drainage Device) all   Problems Present (Chest Tube Drainage Device) pain

## 2018-03-09 NOTE — PLAN OF CARE
Problem: Patient Care Overview (Adult)  Goal: Plan of Care Review  Outcome: Ongoing (interventions implemented as appropriate)   03/09/18 0453   Coping/Psychosocial Response Interventions   Plan Of Care Reviewed With patient   Patient Care Overview   Progress improving   Outcome Evaluation   Outcome Summary/Follow up Plan VSS, am labs, no complaints, 2L o2, TCBD, encourage activity, CXR this am and hopefully clamp CT       Problem: COPD, Chronic Bronchitis/Emphysema (Adult)  Goal: Signs and Symptoms of Listed Potential Problems Will be Absent or Manageable (COPD, Chronic Bronchitis/Emphysema)  Outcome: Ongoing (interventions implemented as appropriate)      Problem: Infection, Risk/Actual (Adult)  Goal: Identify Related Risk Factors and Signs and Symptoms  Outcome: Ongoing (interventions implemented as appropriate)      Problem: Chest Tube Drainage Device (Adult)  Goal: Signs and Symptoms of Listed Potential Problems Will be Absent or Manageable (Chest Tube Drainage Device)  Outcome: Ongoing (interventions implemented as appropriate)

## 2018-03-09 NOTE — PLAN OF CARE
Problem: Patient Care Overview (Adult)  Goal: Plan of Care Review  Outcome: Ongoing (interventions implemented as appropriate)   03/08/18 1815   Coping/Psychosocial Response Interventions   Plan Of Care Reviewed With patient   Patient Care Overview   Progress progress towards functional goals is fair   Outcome Evaluation   Outcome Summary/Follow up Plan VSS, continue to monitor, chest tube maintenance, oxygen as needed, ambulate with assistance      Goal: Adult Individualization and Mutuality  Outcome: Ongoing (interventions implemented as appropriate)   03/08/18 1815   Individualization   Patient Specific Preferences prefers for lights to be left on in room and door left open    Patient Specific Goals pain controlled, mobility improved, oxygen weaned if tolerated, lung function improved    Patient Specific Interventions pain meds prn, monitor vitals/02 sats, ambulate with assist, chest tube care    Mutuality/Individual Preferences   What Anxieties, Fears or Concerns Do You Have About Your Health or Care? length of hospital stay, pain control, oxygen usage, chest tube maintenance    What Questions Do You Have About Your Health or Care? How long will I be here? How long will I have chest tube?    What Information Would Help Us Give You More Personalized Care? Keep patient updated on plan of care, new tx orders, meds ordered, possible d/c date      Goal: Discharge Needs Assessment  Outcome: Ongoing (interventions implemented as appropriate)   03/08/18 1815   Discharge Needs Assessment   Concerns To Be Addressed adjustment to diagnosis/illness concerns;coping/stress concerns   Readmission Within The Last 30 Days no previous admission in last 30 days   Equipment Needed After Discharge none   Current Discharge Risk dependent with mobility/activities of daily living   Discharge Disposition still a patient   Discharge Planning Comments plans on going home with family assistance after hospital discharge    Current Health    Anticipated Changes Related to Illness none   Self-Care   Equipment Currently Used at Home none   Living Environment   Transportation Available car;family or friend will provide       Problem: COPD, Chronic Bronchitis/Emphysema (Adult)  Goal: Signs and Symptoms of Listed Potential Problems Will be Absent or Manageable (COPD, Chronic Bronchitis/Emphysema)  Outcome: Ongoing (interventions implemented as appropriate)   03/08/18 1815   COPD, Chronic Bronchitis/Emphysema   Problems Assessed (COPD, Chronic Bronchitis/Emphysema) all   Problems Present (COPD, Chronic Bronchitis/Emphysema) hypoxia/hypoxemia;situational response       Problem: Respiratory Insufficiency (Adult)  Goal: Identify Related Risk Factors and Signs and Symptoms  Outcome: Ongoing (interventions implemented as appropriate)   03/08/18 1815   Respiratory Insufficiency   Related Risk Factors (Respiratory Insufficiency) medication effects;pain;knowledge deficit   Signs and Symptoms (Respiratory Insufficiency) blood pressure/heart rate changes     Goal: Acid/Base Balance  Outcome: Ongoing (interventions implemented as appropriate)   03/08/18 1815   Respiratory Insufficiency (Adult)   Acid/Base Balance making progress toward outcome     Goal: Effective Ventilation  Outcome: Ongoing (interventions implemented as appropriate)   03/08/18 1815   Respiratory Insufficiency (Adult)   Effective Ventilation making progress toward outcome      03/08/18 1815   Respiratory Insufficiency (Adult)   Effective Ventilation making progress toward outcome       Problem: Fluid Volume Deficit (Adult)  Goal: Identify Related Risk Factors and Signs and Symptoms  Outcome: Ongoing (interventions implemented as appropriate)   03/08/18 1815   Fluid Volume Deficit   Fluid Volume Deficit: Related Risk Factors age extremes;medication effects;pain   Signs and Symptoms (Fluid Volume Deficit) (none )     Goal: Fluid/Electrolyte Balance  Outcome: Ongoing (interventions implemented as  appropriate)   03/08/18 1815   Fluid Volume Deficit (Adult)   Fluid/Electrolyte Balance making progress toward outcome     Goal: Comfort/Well Being  Outcome: Ongoing (interventions implemented as appropriate)   03/08/18 1815   Fluid Volume Deficit (Adult)   Comfort/Well Being making progress toward outcome       Problem: Infection, Risk/Actual (Adult)  Goal: Identify Related Risk Factors and Signs and Symptoms  Outcome: Ongoing (interventions implemented as appropriate)   03/08/18 1815   Infection, Risk/Actual   Infection, Risk/Actual: Related Risk Factors age extremes;chronic illness/condition;tissue perfusion altered   Signs and Symptoms (Infection, Risk/Actual) blood glucose changes;weakness;pain     Goal: Infection Prevention/Resolution  Outcome: Ongoing (interventions implemented as appropriate)   03/08/18 1815   Infection, Risk/Actual (Adult)   Infection Prevention/Resolution making progress toward outcome       Problem: Chest Tube Drainage Device (Adult)  Goal: Signs and Symptoms of Listed Potential Problems Will be Absent or Manageable (Chest Tube Drainage Device)  Outcome: Ongoing (interventions implemented as appropriate)   03/08/18 1815   Chest Tube Drainage Device   Problems Assessed (Chest Tube Drainage Device) all   Problems Present (Chest Tube Drainage Device) pain

## 2018-03-09 NOTE — PROGRESS NOTES
"                                              LOS: 5 days   Patient Care Team:  Adam Feliz MD as PCP - General (Internal Medicine)    Chief Complaint:  Follow-up on COPD, no pneumothorax and medical problems listed below    Interval History:   Pain is controlled.  No dyspnea at rest.          Ventilator/Non-Invasive Ventilation Settings     None            Vital Signs  Temp:  [97.4 °F (36.3 °C)-98.3 °F (36.8 °C)] 97.6 °F (36.4 °C)  Heart Rate:  [78-99] 99  Resp:  [16-18] 16  BP: (130-159)/() 130/79    Intake/Output Summary (Last 24 hours) at 03/09/18 1622  Last data filed at 03/09/18 1542   Gross per 24 hour   Intake              360 ml   Output             1670 ml   Net            -1310 ml     Flowsheet Rows         First Filed Value    Admission Height  165.1 cm (65\") Documented at 03/04/2018 1736    Admission Weight  52.2 kg (115 lb) Documented at 03/04/2018 1738          Physical Exam:   General Appearance:    Alert, cooperative, in no acute distress   Lungs:     Significantly decreased air entry throughout.  No wheezing or crackles     Heart:    Regular rhythm and normal rate, normal S1 and S2, no            murmur, no gallop, no rub, no click   Chest Wall:    No abnormalities observed   Abdomen:     Soft.  No tenderness    Neuro:   Conscious, alert, oriented x3   Extremities:   Moves all extremities well, no edema, no cyanosis, no             Redness          Results Review:          Results from last 7 days  Lab Units 03/08/18  0407 03/05/18  0435 03/04/18  1744   SODIUM mmol/L 139 138 138   POTASSIUM mmol/L 5.0 4.7 5.1   CHLORIDE mmol/L 106 102 99   CO2 mmol/L 23.4 22.5 25.3   BUN mg/dL 18 18 18   CREATININE mg/dL 0.97 1.19 1.59*   GLUCOSE mg/dL 119* 123* 102*   CALCIUM mg/dL 8.1* 8.9 9.3       Results from last 7 days  Lab Units 03/04/18  1744   TROPONIN T ng/mL <0.010       Results from last 7 days  Lab Units 03/04/18  1744   WBC 10*3/mm3 8.68   HEMOGLOBIN g/dL 12.3*   HEMATOCRIT % 38.0* "   PLATELETS 10*3/mm3 212       Results from last 7 days  Lab Units 03/04/18  1744   INR  1.01       Results from last 7 days  Lab Units 03/04/18  1744   PROBNP pg/mL 238.5       I reviewed the patient's new clinical results.  I personally viewed and interpreted the patient's CXR        Medication Review:     aspirin 81 mg Oral Daily   atorvastatin 10 mg Oral Daily   brimonidine-timolol 1 drop Both Eyes Q12H   docusate sodium 100 mg Oral Daily   insulin aspart 0-9 Units Subcutaneous 4x Daily With Meals & Nightly   ipratropium-albuterol 3 mL Nebulization Q8H - RT   ketorolac 15 mg Intramuscular Once   methylPREDNISolone sodium succinate 40 mg Intravenous Q12H   pantoprazole 40 mg Oral Q AM   polyethylene glycol 17 g Oral Daily   tamsulosin 0.4 mg Oral Daily            Diagnostic imaging:  I personally and independently reviewed theFollowing images:  Since the last chest x-ray in December, there is interval development of left pneumothorax               Assessment/Plan   1. Shortness of breath, secondary to the problems listed below  2. Recurrent left sided pneumothorax, spontaneous.  First event occurred and October and treated with a chest tube. S/P small bore chest tube insertion on 3/6/18, talc pleurodhesis 3/7/18  3. Acute exacerbation of COPD, moderate COPD with moderate emphysema at baseline  4. BON on CKD, resolved  5. 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 Sinus tachycardia on EKG  7. Macular degeneration with difficulties using his inhaler  8. Left sided Chest pain related to the chest tube insertion    Family has a lot of question regarding oxygen therapy. Daughter and son believes hat he would benefit from Oxygen. I explained that he does not qualify per medicare criteria at this point, awaiting for further testing.   Also daughter is concerned about the does of Spiriva which was reduced and the fact the patient does not appear to benefit from his rescue inhalers     Overnight  ox  Walking ox in AM  Chest tube management for thoracic surgery. Likely discontinue in AM.  I took him off O2 and SpO2 remained >90 while awake  Titrate steroid  Neb for home      Spent more than 25 min face to face discussing with patient and family in addition to counseling regarding Oxygen use and nebulizer therapy.    Benji Huynh MD  03/09/18  4:22 PM          This note was dictated utilizing [employer] dictation

## 2018-03-09 NOTE — PROGRESS NOTES
"    Chief Complaint   Patient presents with   • Shortness of Breath     history of collapsed lung and patient feels the same way today      Consulted for: Recurrent left spontaneous pneumothorax  S/P: Chest tube placement under fluoro/ Bedside pleuradesis  POD # 3/2    Subjective:  Symptoms:  (Doing well this morning.  No complaints of pain.  Decreased shortness of breath.  No bowel movement since hospitalization.).        Vital Signs:  Temp:  [97.4 °F (36.3 °C)-98.3 °F (36.8 °C)] 97.5 °F (36.4 °C)  Heart Rate:  [] 78  Resp:  [16-20] 16  BP: (136-176)/() 136/80    Intake & Output (last day)       03/08 0701 - 03/09 0700 03/09 0701 - 03/10 0700    P.O. 720     I.V. (mL/kg) 500 (11.2)     IV Piggyback      Total Intake(mL/kg) 1220 (27.2)     Urine (mL/kg/hr) 1700 (1.6) 200 (1.3)    Other 120 (0.1)     Stool 0 (0)     Total Output 1820 200    Net -600 -200          Unmeasured Urine Occurrence 1 x     Unmeasured Stool Occurrence 0 x           Objective:  General Appearance:  Comfortable and in no acute distress.    Vital signs: (most recent): Blood pressure 136/80, pulse 78, temperature 97.5 °F (36.4 °C), temperature source Oral, resp. rate 16, height 165.1 cm (65\"), weight 44.8 kg (98 lb 12.8 oz), SpO2 96 %.  Vital signs are normal.  No fever.    Lungs:  Normal respiratory rate and normal effort.  There are decreased breath sounds.  (2.  At 2 L/m per nasal cannula)  Heart: Normal rate.  Regular rhythm.  No murmur.   Abdomen: Abdomen is soft and non-distended.    Extremities: Normal range of motion.  There is no dependent edema.    Neurological: Patient is alert and oriented to person, place and time.    Skin:  Warm and dry.              Chest tube:   Site: Left, Clean, Dry and Intact  Suction: waterseal  Air Leak: negative  24 Hour Total: 120    Results Review:     I reviewed the patient's new clinical results.  I reviewed the patient's new imaging results and agree with the interpretation.    Imaging " Results (last 24 hours)     Procedure Component Value Units Date/Time    XR Chest 1 View [[id number]] Collected:  03/08/18 0754     Updated:  03/08/18 1351    Narrative:       CLINICAL HISTORY: 85-year-old male with asbestosis, spontaneous left  pneumothorax treated with placement of pigtail type left chest tube and  reported talc pleurodesis.     PORTABLE AP SEMIERECT CHEST 03/08/2018 AT 0542 HOURS.     FINDINGS: When compared to the exam dated 03/07/2018 at 0558 hours, the  pigtail type left chest catheter remains at the superolateral left  chest, and no large pneumothorax is seen. Minimal if any trace of apical  left pneumothorax is seen on the current exam. There is interval  development of patchy to dense opacity in the left lower lobe  distribution. Hazy trace blunting of left costophrenic angle is present  on the current exam and does not exclude trace pleural fluid. The right  costophrenic angle is sharp. The cardiac silhouette is normal in  caliber. Aortic uncoiling with calcification is demonstrated. Oxygen  cannula tubing projects about the neck and right axilla. Monitoring lead  wires are present. Degenerative changes in the bony thorax are noted.  Multifocal calcifications about the right and left chest are again  demonstrated and were found to include both partially calcified pleural  plaques and pulmonary calcifications on CT scan of the chest dated  11/08/2017.     CONCLUSION: Minimal if any trace left apical pneumothorax with pigtail  type left chest tube present. Development of trace hazy blunting of left  costophrenic angle and fairly extensive hazy to dense opacity in the  lower left lung zone.     This report was finalized on 3/8/2018 1:48 PM by Dr. Luis Huddleston MD.       XR Chest 1 View [[id number]] Collected:  03/09/18 0818     Updated:  03/09/18 0917    Narrative:       CLINICAL HISTORY: 85-year-old male with asbestosis, spontaneous left  pneumothorax treated with placement of pigtail type  left chest tube, and  reported talc pleurodesis.     PORTABLE AP ERECT CHEST DATED 03/09/2018 AT 0537 HOURS     FINDINGS: When compared to the most recent available prior chest  radiograph dated 03/08/2018 at 0542 hours, the only demonstrated  pneumothorax is at the apical to superolateral left chest, projecting  adjacent to the pigtail type superolateral left chest catheter and only  2-3 mm demonstrated thickness. Patchy to dense opacity in the left lung  base and some increased patchy opacity in the right lung base are  present. Skin fold artifact is seen over the right upper chest. The  multifocal calcific opacities about the right and left chest are again  noted. The heart is normal in caliber. Aortic uncoiling and  calcification are again demonstrated. No acute change in bony thorax is  seen. Oxygen cannula tubing and monitoring lead wires are again  demonstrated. Metallic surgical anchor in the left proximal humerus is  again demonstrated.     CONCLUSION: Similar fairly extensive hazy and dense opacity in the left  lower chest, possibly including a small amount of left pleural fluid.  Slightly increased patchy opacity in the right lung base. There is only  a minimal trace of left apical to superolateral pneumothorax  superimposing the pigtail type left chest catheter.     This report was finalized on 3/9/2018 9:14 AM by Dr. Luis Huddleston MD.             Lab Results:     Lab Results (last 24 hours)     Procedure Component Value Units Date/Time    POC Glucose Once [[id number]]  (Normal) Collected:  03/08/18 1137    Specimen:  Blood Updated:  03/08/18 1139     Glucose 102 mg/dL     Narrative:       Meter: [id number] : [id number] Rosetta TRUJILLO    POC Glucose Once [[id number]]  (Normal) Collected:  03/08/18 1648    Specimen:  Blood Updated:  03/08/18 1650     Glucose 122 mg/dL     Narrative:       Meter: [id number] : [id number] Torrey Benjamin    POC Glucose Once [[id number]]  (Normal) Collected:  03/08/18 2104     Specimen:  Blood Updated:  03/08/18 2105     Glucose 113 mg/dL     Narrative:       Meter: [id number] : [id number] Pako Wilson RONNIE    POC Glucose Once [[id number]]  (Normal) Collected:  03/09/18 0749    Specimen:  Blood Updated:  03/09/18 0751     Glucose 98 mg/dL     Narrative:       Meter: [id number] : [id number] Avery TRUJILLO           Assessment/Plan     Active Problems:    COPD exacerbation       Assessment:    Condition: In stable condition.  Improving.       Plan:   Encourage ambulation.  Start/continue incentive spirometry.  X-rays as ordered.  (Chest x-ray stable this morning.  He has no airleak noted.  Trial of clamping his chest tube today.  Repeat CXR in am.  Continue current treatment plan.  Pulmonary hygiene, incentive spirometer, increase activity, and pain management.  Miralax daily for constipation.  ).       Tracey Quiroga, APRN  [facility]  03/09/18  10:21 AM

## 2018-03-10 ENCOUNTER — APPOINTMENT (OUTPATIENT)
Dept: GENERAL RADIOLOGY | Facility: HOSPITAL | Age: 83
End: 2018-03-10

## 2018-03-10 VITALS
TEMPERATURE: 97.3 F | OXYGEN SATURATION: 90 % | DIASTOLIC BLOOD PRESSURE: 68 MMHG | WEIGHT: 98.8 LBS | BODY MASS INDEX: 16.46 KG/M2 | SYSTOLIC BLOOD PRESSURE: 119 MMHG | HEART RATE: 103 BPM | HEIGHT: 65 IN | RESPIRATION RATE: 16 BRPM

## 2018-03-10 LAB
GLUCOSE BLDC GLUCOMTR-MCNC: 82 MG/DL (ref 70–130)
GLUCOSE BLDC GLUCOMTR-MCNC: 84 MG/DL (ref 70–130)

## 2018-03-10 PROCEDURE — 94799 UNLISTED PULMONARY SVC/PX: CPT

## 2018-03-10 PROCEDURE — 71045 X-RAY EXAM CHEST 1 VIEW: CPT

## 2018-03-10 PROCEDURE — 94618 PULMONARY STRESS TESTING: CPT

## 2018-03-10 PROCEDURE — 63710000001 PREDNISONE PER 5 MG: Performed by: INTERNAL MEDICINE

## 2018-03-10 PROCEDURE — 82962 GLUCOSE BLOOD TEST: CPT

## 2018-03-10 PROCEDURE — 63710000001 PREDNISONE PER 1 MG: Performed by: INTERNAL MEDICINE

## 2018-03-10 RX ORDER — ALBUTEROL SULFATE 2.5 MG/3ML
2.5 SOLUTION RESPIRATORY (INHALATION) 4 TIMES DAILY PRN
Qty: 125 VIAL | Refills: 2 | Status: SHIPPED | OUTPATIENT
Start: 2018-03-10

## 2018-03-10 RX ORDER — PREDNISONE 10 MG/1
10 TABLET ORAL DAILY
Qty: 5 TABLET | Refills: 0 | Status: SHIPPED | OUTPATIENT
Start: 2018-03-10 | End: 2018-03-15

## 2018-03-10 RX ADMIN — DOCUSATE SODIUM 100 MG: 100 CAPSULE, LIQUID FILLED ORAL at 10:13

## 2018-03-10 RX ADMIN — POLYETHYLENE GLYCOL 3350 17 G: 17 POWDER, FOR SOLUTION ORAL at 10:13

## 2018-03-10 RX ADMIN — BRIMONIDINE TARTRATE, TIMOLOL MALEATE 1 DROP: 2; 5 SOLUTION/ DROPS OPHTHALMIC at 12:55

## 2018-03-10 RX ADMIN — PREDNISONE 30 MG: 20 TABLET ORAL at 10:13

## 2018-03-10 RX ADMIN — IPRATROPIUM BROMIDE AND ALBUTEROL SULFATE 3 ML: .5; 3 SOLUTION RESPIRATORY (INHALATION) at 07:53

## 2018-03-10 RX ADMIN — ASPIRIN 81 MG: 81 TABLET, CHEWABLE ORAL at 10:13

## 2018-03-10 RX ADMIN — PANTOPRAZOLE SODIUM 40 MG: 40 TABLET, DELAYED RELEASE ORAL at 06:22

## 2018-03-10 RX ADMIN — TAMSULOSIN HYDROCHLORIDE 0.4 MG: 0.4 CAPSULE ORAL at 10:13

## 2018-03-10 RX ADMIN — IPRATROPIUM BROMIDE AND ALBUTEROL SULFATE 3 ML: .5; 3 SOLUTION RESPIRATORY (INHALATION) at 16:52

## 2018-03-10 RX ADMIN — ATORVASTATIN CALCIUM 10 MG: 10 TABLET, FILM COATED ORAL at 10:13

## 2018-03-10 NOTE — DISCHARGE SUMMARY
DISCHARGE SUMMARY    Patient Name: Louis Villalba  Age/Sex: 85 y.o. male  : 10/20/1932  MRN: [id number]  Patient Care Team:  Adam Feliz MD as PCP - General (Internal Medicine)       Date of Admit: 3/4/2018  Date of Discharge:  03/10/18  Discharge Condition: Good    Discharge Diagnoses:  1. Shortness of breath, secondary to the problems listed below  2. Recurrent left sided pneumothorax, spontaneous.  First event occurred and October and treated with a chest tube. S/P small bore chest tube insertion on 3/6/18, talc pleurodhesis 3/7/18  3. Acute exacerbation of COPD, moderate COPD with moderate emphysema at baseline  4. BON on CKD, resolved  5. 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 Sinus tachycardia on EKG  7. Macular degeneration with difficulties using his inhaler.   8. Left sided Chest pain related to the chest tube insertion.  9. Right chest rash, no itching    History of present illness from H&P from 3/4/2018:   Louis Villalba is a 85 y.o. male that usually follows with Dr. Chavarria , He is a pleasant 85-year-old white male ex-70-pack-year smoker, quit in  with a past medical history significant for chronic obstructive pulmonary disease, asbestos related lung disease, peptic ulcer disease, gastric outlet obstruction, hyperlipidemia, age related macular degeneration, recent spontaneous left-sided pneumothorax, patient says that he has been a longtime smoker but has quit 10 years back.  He is extremely active and goes to gym twice a day and stays very active.  He woke up this morning he went to [employer] was doing fine but in the early afternoon he started having rapidly progressive chest tightness and was unable to breathe deeply enough.  He was not sure whether there was any associated wheezing but it was evident at the time of assessment.  Patient does not have a nebulizer at home but he is feeling better after the first nebulizer treatment in the emergency  room.  Patient reported that the symptoms were similar to the time when he had his pneumothorax but his x-ray was not suggestive of that.  He denies any fever or chills he denies any productive secretion he does have his chronic cough which is no different.  He denies any sick exposure he denies any hemoptysis.  Patient is slim but he denies any weight loss and according the family is very physically active.  Patient had no mental status changes, he denies any GI or  complaints, he is to have some urinary obstruction that did better with the Flomax.  No heat or cold intolerance no chills.    Hospital Course:   Patient was initially treated for COPD exacerbation.  He did indeed have some exacerbation with productive cough which improved with nebulizers and steroid therapy.  However his chest x-ray was reviewed and showed  left pneumothorax which persisted on follow-up imaging.  Since he has recurrent pneumothorax, decision was made to perform pleurodesis.  Thoracic surgery consulted and recommended pleurodesis with chest tube rather than fats due to the patient's severe COPD and old age.  This was performed successfully.  Patient tolerated very well.  He did not require oxygen at rest or on ambulation however his overnight oximetry showed desaturation with hypoxic burden of 1 and 1 minute.  He will be discharged home with oxygen at night.  I also provided him with the nebulizer since he has difficulties using his rescue inhaler with no reported relief.  His Spiriva dose was increased to 5 mg a day per his daughter request.    Consults:   IP CONSULT TO PULMONOLOGY  IP CONSULT TO NUTRITION SERVICES  IP CONSULT TO THORACIC SURGERY  IP CONSULT TO CASE MANAGEMENT     Significant Discharge Diagnostics   Procedures Performed:         Pertinent Lab Results:    Results from last 7 days  Lab Units 03/08/18  0407 03/05/18  0435 03/04/18  1744   SODIUM mmol/L 139 138 138   POTASSIUM mmol/L 5.0 4.7 5.1   CHLORIDE  mmol/L 106 102 99   CO2 mmol/L 23.4 22.5 25.3   BUN mg/dL 18 18 18   CREATININE mg/dL 0.97 1.19 1.59*   GLUCOSE mg/dL 119* 123* 102*   CALCIUM mg/dL 8.1* 8.9 9.3   AST (SGOT) U/L  --   --  26   ALT (SGPT) U/L  --   --  13       Results from last 7 days  Lab Units 03/04/18  1744   TROPONIN T ng/mL <0.010       Results from last 7 days  Lab Units 03/04/18  1744   WBC 10*3/mm3 8.68   HEMOGLOBIN g/dL 12.3*   HEMATOCRIT % 38.0*   PLATELETS 10*3/mm3 212   MCV fL 96.4*   MCH pg 31.2   MCHC g/dL 32.4*   RDW % 14.2   RDW-SD fl 50.4   MPV fL 12.5*   NEUTROPHIL % % 61.3   LYMPHOCYTE % % 22.7   MONOCYTES % % 12.3*   EOSINOPHIL % % 3.2   BASOPHIL % % 0.3   IMM GRAN % % 0.2   NEUTROS ABS 10*3/mm3 5.31   LYMPHS ABS 10*3/mm3 1.97   MONOS ABS 10*3/mm3 1.07   EOS ABS 10*3/mm3 0.28   BASOS ABS 10*3/mm3 0.03   IMMATURE GRANS (ABS) 10*3/mm3 0.02       Results from last 7 days  Lab Units 03/04/18  1744   INR  1.01               Invalid input(s): LDLCALC    Results from last 7 days  Lab Units 03/04/18  1744   PROBNP pg/mL 238.5                                           Imaging Results:  Imaging Results (all)     Procedure Component Value Units Date/Time    XR Chest 1 View [[id number]] Collected:  03/10/18 1131     Updated:  03/10/18 1135    Narrative:       ONE VIEW PORTABLE CHEST AT 11:24 AM     HISTORY: Recent removal of left-sided chest tube. Previous pneumothorax.     The left-sided chest tube has been removed and no pneumothorax is seen.  The lungs are mildly hyperinflated with scattered calcified pleural  plaques unchanged from previous studies. The heart size is normal.     This report was finalized on 3/10/2018 11:32 AM by Dr. Jose Lee MD.       XR Chest 1 View [[id number]] Collected:  03/10/18 0706     Updated:  03/10/18 0714    Narrative:       ONE VIEW PORTABLE CHEST AT 5:40 AM     HISTORY: Asbestosis. Left pneumothorax treated with chest tube.     FINDINGS: A pigtail chest tube is present near the left apex and there  may  be an extremely tiny 2-3 mm adjacent pneumothorax unchanged from  yesterday's exam. There is improvement in some localized atelectasis at  the left base. The heart size remains normal.     This report was finalized on 3/10/2018 7:11 AM by Dr. Jose Lee MD.       XR Chest 1 View [[id number]] Collected:  03/09/18 0818     Updated:  03/09/18 0917    Narrative:       CLINICAL HISTORY: 85-year-old male with asbestosis, spontaneous left  pneumothorax treated with placement of pigtail type left chest tube, and  reported talc pleurodesis.     PORTABLE AP ERECT CHEST DATED 03/09/2018 AT 0537 HOURS     FINDINGS: When compared to the most recent available prior chest  radiograph dated 03/08/2018 at 0542 hours, the only demonstrated  pneumothorax is at the apical to superolateral left chest, projecting  adjacent to the pigtail type superolateral left chest catheter and only  2-3 mm demonstrated thickness. Patchy to dense opacity in the left lung  base and some increased patchy opacity in the right lung base are  present. Skin fold artifact is seen over the right upper chest. The  multifocal calcific opacities about the right and left chest are again  noted. The heart is normal in caliber. Aortic uncoiling and  calcification are again demonstrated. No acute change in bony thorax is  seen. Oxygen cannula tubing and monitoring lead wires are again  demonstrated. Metallic surgical anchor in the left proximal humerus is  again demonstrated.     CONCLUSION: Similar fairly extensive hazy and dense opacity in the left  lower chest, possibly including a small amount of left pleural fluid.  Slightly increased patchy opacity in the right lung base. There is only  a minimal trace of left apical to superolateral pneumothorax  superimposing the pigtail type left chest catheter.     This report was finalized on 3/9/2018 9:14 AM by Dr. Luis Huddleston MD.       XR Chest 1 View [[id number]] Collected:  03/08/18 0754     Updated:  03/08/18 1351     Narrative:       CLINICAL HISTORY: 85-year-old male with asbestosis, spontaneous left  pneumothorax treated with placement of pigtail type left chest tube and  reported talc pleurodesis.     PORTABLE AP SEMIERECT CHEST 03/08/2018 AT 0542 HOURS.     FINDINGS: When compared to the exam dated 03/07/2018 at 0558 hours, the  pigtail type left chest catheter remains at the superolateral left  chest, and no large pneumothorax is seen. Minimal if any trace of apical  left pneumothorax is seen on the current exam. There is interval  development of patchy to dense opacity in the left lower lobe  distribution. Hazy trace blunting of left costophrenic angle is present  on the current exam and does not exclude trace pleural fluid. The right  costophrenic angle is sharp. The cardiac silhouette is normal in  caliber. Aortic uncoiling with calcification is demonstrated. Oxygen  cannula tubing projects about the neck and right axilla. Monitoring lead  wires are present. Degenerative changes in the bony thorax are noted.  Multifocal calcifications about the right and left chest are again  demonstrated and were found to include both partially calcified pleural  plaques and pulmonary calcifications on CT scan of the chest dated  11/08/2017.     CONCLUSION: Minimal if any trace left apical pneumothorax with pigtail  type left chest tube present. Development of trace hazy blunting of left  costophrenic angle and fairly extensive hazy to dense opacity in the  lower left lung zone.     This report was finalized on 3/8/2018 1:48 PM by Dr. Luis Huddleston MD.       IR Guided Chest Tube Placement [[id number]] Collected:  03/06/18 1447     Updated:  03/08/18 0807    Narrative:       FLUOROSCOPICALLY GUIDED LEFT CHEST TUBE PLACEMENT     HISTORY: Male who is 85 years-old, with a history of recent spontaneous  left pneumothorax presenting for left-sided chest tube placement.      The procedure, including the risk of hemorrhage, infection,  diagnostic  or technical failure, was discussed with the patient prior to  examination and consent was obtained.     PROCEDURE:     Fluoroscopically guided imaging was performed and skin marker was  placed.     The patient was prepped and draped in the usual fashion. Following  adequate local anesthesia with 1% lidocaine, dermatotomy was placed and  needle placement performed.     Utilizing real-time fluoroscopic guidance, guidewire was advanced  through the needle into the pleural cavity and needle was removed. The  tract was dilated and subsequent 14 [place] Kenny catheter was advanced  over the guidewire. Guidewire was removed.     Pneumothorax was evacuated and catheter was sutured in position with  standard technique and 1% Xylocaine.     Tube was attached to waterseal for drainage.     Follow-up imaging demonstrates no complicating feature.       Impression:       Satisfactory uneventful fluoroscopically guided chest tube  placement for treatment of left pneumothorax as described above.     FLUOROSCOPY TIME: 32 seconds, single image           TWO-VIEW CHEST     HISTORY: 85-year-old male with spontaneous left pneumothorax status post  fluoroscopic guided left chest tube placement     COMPARISON: Preop imaging of 3 of 518 which demonstrated 2.3 cm left  upper pneumothorax     FINDINGS:  1. 3 mm residual left apical pneumothorax with Kenny chest tube in good  position.  2. No other interval change.     This report was finalized on 3/6/2018 2:51 PM by Dr. Ace Serra MD.       XR Chest PA & Lateral [[id number]] Collected:  03/06/18 1447     Updated:  03/08/18 0807    Narrative:       FLUOROSCOPICALLY GUIDED LEFT CHEST TUBE PLACEMENT     HISTORY: Male who is 85 years-old, with a history of recent spontaneous  left pneumothorax presenting for left-sided chest tube placement.      The procedure, including the risk of hemorrhage, infection, diagnostic  or technical failure, was discussed with the patient prior  to  examination and consent was obtained.     PROCEDURE:     Fluoroscopically guided imaging was performed and skin marker was  placed.     The patient was prepped and draped in the usual fashion. Following  adequate local anesthesia with 1% lidocaine, dermatotomy was placed and  needle placement performed.     Utilizing real-time fluoroscopic guidance, guidewire was advanced  through the needle into the pleural cavity and needle was removed. The  tract was dilated and subsequent 14 [place] Kenny catheter was advanced  over the guidewire. Guidewire was removed.     Pneumothorax was evacuated and catheter was sutured in position with  standard technique and 1% Xylocaine.     Tube was attached to waterseal for drainage.     Follow-up imaging demonstrates no complicating feature.       Impression:       Satisfactory uneventful fluoroscopically guided chest tube  placement for treatment of left pneumothorax as described above.     FLUOROSCOPY TIME: 32 seconds, single image           TWO-VIEW CHEST     HISTORY: 85-year-old male with spontaneous left pneumothorax status post  fluoroscopic guided left chest tube placement     COMPARISON: Preop imaging of 3 of 518 which demonstrated 2.3 cm left  upper pneumothorax     FINDINGS:  1. 3 mm residual left apical pneumothorax with Kenny chest tube in good  position.  2. No other interval change.     This report was finalized on 3/6/2018 2:51 PM by Dr. Ace Serra MD.       XR Chest 1 View [[id number]] Collected:  03/07/18 0723     Updated:  03/07/18 0727    Narrative:       PORTABLE CHEST SINGLE VIEW     HISTORY: 85-year-old male for chest tube management status post  fluoroscopically guided Kenny catheter placement yesterday     COMPARISON: 03/06/2018     FINDINGS:  1. Left chest tubes in good position, no pneumothorax.  2. No other change since previous study.     This report was finalized on 3/7/2018 7:24 AM by Dr. Ace Serra MD.       XR Chest PA & Lateral [[id number]]  Collected:  03/05/18 1556     Updated:  03/05/18 1635    Narrative:       TWO-VIEW CHEST     HISTORY: Follow-up of pneumothorax. Emphysema.     There is very severe COPD with marked hyperinflation and there are  multiple scattered calcified granulomas. Again noted is a left-sided  pneumothorax extending along the lateral margin of the chest wall and  measuring up to 2.3 cm in thickness compared to 2.0 cm on yesterday's  exam. There is no mediastinal shift. The heart size remains normal.     This report was finalized on 3/5/2018 4:32 PM by Dr. Jose Lee MD.       XR Chest 2 View [[id number]] Collected:  03/04/18 1949     Updated:  03/05/18 1007    Narrative:       CHEST 2 VIEWS     HISTORY: Shortness of air.     FINDINGS: 2 views of the chest demonstrate the heart to be within normal  limits in size. The diaphragm is flattened and AP diameter increased  consistent with COPD. Pleural calcification is noted limiting evaluation  in terms of evaluation for pulmonary nodules but no convincing pulmonary  nodules are identified. The nodule noted on the CT examination of  12/24/2017 is not able to be appreciated. This can be further assessed  with a CT examination of the chest. There is no evidence of focal  infiltrate, effusion or congestive failure.     A pneumothorax is appreciated on the left with approximately 2 cm  separation between the visceral and parietal pleura. A follow-up chest  x-ray is recommended.     The above information was called to and discussed with Dr. Huynh     This report was finalized on 3/5/2018 10:04 AM by Dr. Marko Cee MD.             Objective:   Temp:  [97.3 °F (36.3 °C)-98.3 °F (36.8 °C)] 97.3 °F (36.3 °C)  Heart Rate:  [] 103  Resp:  [14-18] 16  BP: (119-155)/(68-92) 119/68   SpO2:  [89 %-95 %] 90 %  on    Device (Oxygen Therapy): room air    Intake/Output Summary (Last 24 hours) at 03/10/18 1709  Last data filed at 03/10/18 1500   Gross per 24 hour   Intake              360  ml   Output             2025 ml   Net            -1665 ml     Body mass index is 16.44 kg/m².  1    03/04/18  2100 03/05/18  0600 03/05/18  0944   Weight: 44.8 kg (98 lb 12.8 oz) 47.5 kg (104 lb 11.2 oz) 44.8 kg (98 lb 12.8 oz)     Weight change:     Physical Exam:  General Appearance:    Alert, cooperative, in no acute distress   Lungs:     Significantly decreased air entry throughout.  No wheezing or crackles     Heart:    Regular rhythm and normal rate, normal S1 and S2, no            murmur, no gallop, no rub, no click   Chest Wall:    No abnormalities observed   Abdomen:     Soft.  No tenderness    Neuro:   Conscious, alert, oriented x3   Extremities:   Moves all extremities well, no edema, no cyanosis, no             Redness         Discharge Medications and Instructions:     Discharge Medications   Louis Villalba   Home Medication Instructions LALITO:[id number]    Printed on:03/10/18 4752   Medication Information                      acetaminophen (TYLENOL) 325 MG tablet  Take 2 tablets by mouth Every 6 (Six) Hours As Needed for Mild Pain .             albuterol (ACCUNEB) 0.63 MG/3ML nebulizer solution  Take 3 mL by nebulization Every 6 (Six) Hours As Needed for Wheezing or Shortness of Air.             albuterol (PROVENTIL HFA;VENTOLIN HFA) 108 (90 Base) MCG/ACT inhaler  Inhale 2 puffs Every 4 (Four) Hours As Needed for Wheezing.             albuterol (PROVENTIL) (2.5 MG/3ML) 0.083% nebulizer solution  Take 2.5 mg by nebulization 4 (Four) Times a Day As Needed for Wheezing.             aspirin 81 MG chewable tablet  Chew 81 mg Daily.             atorvastatin (LIPITOR) 10 MG tablet  Take 10 mg by mouth Daily.             brimonidine-timolol (COMBIGAN) 0.2-0.5 % ophthalmic solution  Administer 1 drop to both eyes Every 12 (Twelve) Hours.             Multiple Vitamins-Minerals (VITEYES AREDS FORMULA PO)  Take 2 tablets by mouth Daily.             pantoprazole (PROTONIX) 40 MG EC tablet  take 1 tablet by mouth  once daily             tamsulosin (FLOMAX) 0.4 MG capsule 24 hr capsule  Take 1 capsule by mouth Daily.             Tiotropium Bromide Monohydrate (SPIRIVA RESPIMAT) 2.5 MCG/ACT aerosol solution  Inhale 5 mg Daily.                 Discharge Diet:    Dietary Orders     Start     Ordered    03/06/18 1525  Diet Regular; Consistent Carbohydrate  Diet Effective Now     Question Answer Comment   Diet Texture / Consistency Regular    Common Modifiers Consistent Carbohydrate        03/06/18 1524          Activity at Discharge:   as tolerated    Discharge disposition: Home    Discharge Instructions and Follow ups:    Follow-up Information     Adam Feliz MD .    Specialty:  Internal Medicine  Contact information:  [address] [facility] [id number] [phone]             Hilton Chavarria MD Follow up in 2 week(s).    Specialty:  Pulmonary Disease  Contact information:  [address] [facility] [address]  [facility] [id number] [phone]                 Future Appointments  Date Time Provider Department Center   5/30/2018 11:30 AM [facility] 3  [facility]        Medication Reconciliation: Please see electronically completed Med Rec.    Total time spent discharging patient including evaluation, medication reconciliation, arranging follow up, and post hospitalization instructions and education total time exceeds 30 minutes.     Benji Huynh MD  03/10/18  5:09 PM      Dictated utilizing Dragon dictation:  Much of this encounter note is an electronic transcription/translation of spoken language to printed text. The electronic translation of spoken language may permit erroneous, or at times, nonsensical words or phrases to be inadvertently transcribed; Although I have reviewed the note for such errors, some may still exist.

## 2018-03-10 NOTE — PROGRESS NOTES
Thoracic surgery progress note.  Chief complaint recurrent spontaneous pneumothorax shortness of breath  Chest tube was clamped yesterday.  Chest x-ray shows complete lung expansion and the chest tube position.  Chest tube unclamped at bedside today and no air leak ensued.  The chest tube was removed at bedside.  The patient describes no fevers chills night sweats cough hemoptysis shortness of breath at rest.  Following chest tube removal the patient remains asymptomatic.  No palpable subcutaneous emphysema.  Trachea is midline.  Chest expansion symmetrical.  Breath sounds are distant.  Abdomen is nondistended nontender.  Extremities are free of cyanosis clubbing or edema.  The patient is awake and alert with normal mental status.    #1 recurrent spontaneous pneumothorax we'll check chest x-ray post chest tube removal.  #2 advanced COPD

## 2018-03-10 NOTE — PLAN OF CARE
Problem: Patient Care Overview (Adult)  Goal: Plan of Care Review  Outcome: Resolved for upgrade, new template will be applied Date Met: 03/09/18    Goal: Adult Individualization and Mutuality  Outcome: Resolved for upgrade, new template will be applied Date Met: 03/09/18    Goal: Discharge Needs Assessment  Outcome: Resolved for upgrade, new template will be applied Date Met: 03/09/18      Problem: COPD, Chronic Bronchitis/Emphysema (Adult)  Goal: Signs and Symptoms of Listed Potential Problems Will be Absent or Manageable (COPD, Chronic Bronchitis/Emphysema)  Outcome: Resolved for upgrade, new template will be applied Date Met: 03/09/18      Problem: Respiratory Insufficiency (Adult)  Goal: Identify Related Risk Factors and Signs and Symptoms  Outcome: Resolved for upgrade, new template will be applied Date Met: 03/09/18    Goal: Acid/Base Balance  Outcome: Resolved for upgrade, new template will be applied Date Met: 03/09/18    Goal: Effective Ventilation  Outcome: Resolved for upgrade, new template will be applied Date Met: 03/09/18      Problem: Fluid Volume Deficit (Adult)  Goal: Identify Related Risk Factors and Signs and Symptoms  Outcome: Resolved for upgrade, new template will be applied Date Met: 03/09/18    Goal: Fluid/Electrolyte Balance  Outcome: Resolved for upgrade, new template will be applied Date Met: 03/09/18    Goal: Comfort/Well Being  Outcome: Resolved for upgrade, new template will be applied Date Met: 03/09/18      Problem: Infection, Risk/Actual (Adult)  Goal: Identify Related Risk Factors and Signs and Symptoms  Outcome: Resolved for upgrade, new template will be applied Date Met: 03/09/18    Goal: Infection Prevention/Resolution  Outcome: Resolved for upgrade, new template will be applied Date Met: 03/09/18      Problem: Chest Tube Drainage Device (Adult)  Goal: Signs and Symptoms of Listed Potential Problems Will be Absent or Manageable (Chest Tube Drainage Device)  Outcome: Resolved for  upgrade, new template will be applied Date Met: 03/09/18

## 2018-03-10 NOTE — PLAN OF CARE
Problem: Infection, Risk/Actual (Adult)  Goal: Identify Related Risk Factors and Signs and Symptoms  Outcome: Ongoing (interventions implemented as appropriate)    Goal: Infection Prevention/Resolution  Outcome: Ongoing (interventions implemented as appropriate)      Problem: Patient Care Overview  Goal: Plan of Care Review  Outcome: Ongoing (interventions implemented as appropriate)   03/10/18 1703   Coping/Psychosocial   Plan of Care Reviewed With patient   Plan of Care Review   Progress improving   OTHER   Outcome Summary VSS 0 c/o pain this shift. chest tube removed this am. pt up ad denis. will cont to monitor.     Goal: Individualization and Mutuality  Outcome: Ongoing (interventions implemented as appropriate)      Problem: Chronic Obstructive Pulmonary Disease (Adult)  Goal: Signs and Symptoms of Listed Potential Problems Will be Absent, Minimized or Managed (Chronic Obstructive Pulmonary Disease)  Outcome: Ongoing (interventions implemented as appropriate)      Problem: Fall Risk (Adult)  Goal: Identify Related Risk Factors and Signs and Symptoms  Outcome: Ongoing (interventions implemented as appropriate)    Goal: Absence of Fall  Outcome: Ongoing (interventions implemented as appropriate)

## 2018-03-10 NOTE — PLAN OF CARE
Problem: Infection, Risk/Actual (Adult)  Goal: Identify Related Risk Factors and Signs and Symptoms  Outcome: Ongoing (interventions implemented as appropriate)   03/10/18 0537   Infection, Risk/Actual (Adult)   Related Risk Factors (Infection, Risk/Actual) age extremes;chronic illness/condition   Signs and Symptoms (Infection, Risk/Actual) weakness     Goal: Infection Prevention/Resolution  Outcome: Ongoing (interventions implemented as appropriate)   03/10/18 0537   Infection, Risk/Actual (Adult)   Infection Prevention/Resolution making progress toward outcome       Problem: Patient Care Overview  Goal: Plan of Care Review  Outcome: Ongoing (interventions implemented as appropriate)   03/10/18 0537   Coping/Psychosocial   Plan of Care Reviewed With patient   Plan of Care Review   Progress improving   OTHER   Outcome Summary Monitor labs,pain,and vitals. Overnight oximetry on room air. Possible D/C     Goal: Individualization and Mutuality  Outcome: Ongoing (interventions implemented as appropriate)    Goal: Discharge Needs Assessment  Outcome: Ongoing (interventions implemented as appropriate)    Goal: Interprofessional Rounds/Family Conf  Outcome: Ongoing (interventions implemented as appropriate)   03/10/18 0537   Interdisciplinary Rounds/Family Conf   Participants patient       Problem: Chronic Obstructive Pulmonary Disease (Adult)  Goal: Signs and Symptoms of Listed Potential Problems Will be Absent, Minimized or Managed (Chronic Obstructive Pulmonary Disease)  Outcome: Ongoing (interventions implemented as appropriate)   03/10/18 0537   Goal/Outcome Evaluation   Problems Assessed (Chronic Obstructive Pulmonary Disease (COPD)) situational response   Problems Present (COPD, Bronch/Emphy) situational response

## 2018-03-10 NOTE — PROGRESS NOTES
Exercise Oximetry    Patient Name:Louis Villalba   MRN: [id number]   Date: 03/10/18             ROOM AIR BASELINE   SpO2% 93   Heart Rate 107   Blood Pressure      EXERCISE ON ROOM AIR SpO2% EXERCISE ON O2 @   LPM SpO2%   1 MINUTE 94 1 MINUTE    2 MINUTES 93 2 MINUTES    3 MINUTES 92 3 MINUTES    4 MINUTES 93 4 MINUTES    5 MINUTES 94 5 MINUTES    6 MINUTES 94 6 MINUTES               Distance Walked   Distance Walked   Dyspnea (Kody Scale)   Dyspnea (Kody Scale)   Fatigue (Kody Scale)   Fatigue (Kody Scale)   SpO2% Post Exercise   SpO2% Post Exercise   HR Post Exercise  92 HR Post Exercise   Time to Recovery   Time to Recovery     Comments:

## 2018-03-13 NOTE — PROGRESS NOTES
Case Management Discharge Note    Final Note: Pt discharged home, no known needs.  [facility] to deliver nebulizer to pt home.      Destination     No service coordination in this encounter.      Durable Medical Equipment     No service coordination in this encounter.      Dialysis/Infusion     No service coordination in this encounter.      Home Medical Care     No service coordination in this encounter.      Social Care     No service coordination in this encounter.        Other: Other (private auto)    Final Discharge Disposition Code: 01 - home or self-care

## 2018-03-20 DIAGNOSIS — J93.11 PRIMARY SPONTANEOUS PNEUMOTHORAX: Primary | ICD-10-CM

## 2018-03-28 ENCOUNTER — OFFICE VISIT (OUTPATIENT)
Dept: SURGERY | Facility: CLINIC | Age: 83
End: 2018-03-28

## 2018-03-28 ENCOUNTER — HOSPITAL ENCOUNTER (OUTPATIENT)
Dept: GENERAL RADIOLOGY | Facility: HOSPITAL | Age: 83
Discharge: HOME | End: 2018-03-28
Admitting: NURSE PRACTITIONER

## 2018-03-28 VITALS
OXYGEN SATURATION: 100 % | HEART RATE: 86 BPM | SYSTOLIC BLOOD PRESSURE: 141 MMHG | WEIGHT: 112.6 LBS | DIASTOLIC BLOOD PRESSURE: 74 MMHG | HEIGHT: 65 IN | BODY MASS INDEX: 18.76 KG/M2

## 2018-03-28 DIAGNOSIS — J93.11 PRIMARY SPONTANEOUS PNEUMOTHORAX: ICD-10-CM

## 2018-03-28 DIAGNOSIS — J93.11 PRIMARY SPONTANEOUS PNEUMOTHORAX: Primary | ICD-10-CM

## 2018-03-28 PROCEDURE — 99213 OFFICE O/P EST LOW 20 MIN: CPT | Performed by: NURSE PRACTITIONER

## 2018-03-28 PROCEDURE — 71046 X-RAY EXAM CHEST 2 VIEWS: CPT

## 2018-03-28 NOTE — PROGRESS NOTES
Patient ID: Louis Villalba is a 85 y.o. male is here today for follow-up.    Subjective     Chief Complaint   Patient presents with   • Follow-up     CXR TODAY       History of Present Illness    Louis Villalba presents to the office today for hospital follow-up after recurrent left spontaneous pneumothorax which required left chest tube placement and chemical bedside pleuradesis.  CXR improved after pleuradesis with no reoccurrence of pneumothorax.  His chest tube was removed on 3/10/18 and he was discharged home in stable condition.  He presents to the office today for follow-up with CXR.  Since discharge from the hospital, he has been doing well with no new problems or concerns.  He denies any complaints of fever, chills, cough, hemoptysis, pleuritic chest pain, shortness of air, dyspnea with exertion, night sweats, hoarseness, or unintentional weight loss.      Patient Active Problem List   Diagnosis   • Gastric outlet obstruction   • Primary spontaneous pneumothorax   • COPD (chronic obstructive pulmonary disease)   • Acute exacerbation of chronic obstructive pulmonary disease (COPD)   • Influenza B   • HTN (hypertension)   • Lung nodule   • CKD (chronic kidney disease) stage 3, GFR 30-59 ml/min   • COPD exacerbation     Past Medical History:   Diagnosis Date   • Anemia    • Arthritis    • Cancer     skin: BASAL CELL on face, excised- remote   • Colon polyp    • History of transfusion    • Hyperlipidemia    • Macular degeneration      Past Surgical History:   Procedure Laterality Date   • APPENDECTOMY  1958   • COLONOSCOPY  2013   • ENDOSCOPY N/A 10/5/2016    Procedure: ESOPHAGOGASTRODUODENOSCOPY with biopsy;  Surgeon: Edward Gan MD;  Location: [facility] ENDOSCOPY;  Service:    • EYE SURGERY      cataract b/l surgery   • SKIN BIOPSY       Family History   Problem Relation Age of Onset   • No Known Problems Mother    • No Known Problems Father      Social History     Social History   • Marital status:       Spouse name: N/A   • Number of children: N/A   • Years of education: N/A     Occupational History   • Not on file.     Social History Main Topics   • Smoking status: Former Smoker     Packs/day: 1.50     Years: 10.00     Types: Cigarettes     Start date: 12/24/2000     Quit date: 2010   • Smokeless tobacco: Never Used   • Alcohol use 12.6 oz/week     21 Cans of beer per week      Comment: daily   • Drug use: No   • Sexual activity: Defer     Other Topics Concern   • Not on file     Social History Narrative   • No narrative on file       Current Outpatient Prescriptions:   •  acetaminophen (TYLENOL) 325 MG tablet, Take 2 tablets by mouth Every 6 (Six) Hours As Needed for Mild Pain ., Disp: , Rfl:   •  albuterol (ACCUNEB) 0.63 MG/3ML nebulizer solution, Take 3 mL by nebulization Every 6 (Six) Hours As Needed for Wheezing or Shortness of Air., Disp: 360 mL, Rfl: 2  •  albuterol (PROVENTIL HFA;VENTOLIN HFA) 108 (90 Base) MCG/ACT inhaler, Inhale 2 puffs Every 4 (Four) Hours As Needed for Wheezing., Disp: 2 inhaler, Rfl: 0  •  albuterol (PROVENTIL) (2.5 MG/3ML) 0.083% nebulizer solution, Take 2.5 mg by nebulization 4 (Four) Times a Day As Needed for Wheezing., Disp: 125 vial, Rfl: 2  •  aspirin 81 MG chewable tablet, Chew 81 mg Daily., Disp: , Rfl:   •  atorvastatin (LIPITOR) 10 MG tablet, Take 10 mg by mouth Daily., Disp: , Rfl:   •  brimonidine-timolol (COMBIGAN) 0.2-0.5 % ophthalmic solution, Administer 1 drop to both eyes Every 12 (Twelve) Hours., Disp: , Rfl:   •  Multiple Vitamins-Minerals (VITEYES AREDS FORMULA PO), Take 2 tablets by mouth Daily., Disp: , Rfl:   •  pantoprazole (PROTONIX) 40 MG EC tablet, take 1 tablet by mouth once daily, Disp: 30 tablet, Rfl: 5  •  tamsulosin (FLOMAX) 0.4 MG capsule 24 hr capsule, Take 1 capsule by mouth Daily., Disp: , Rfl:   •  Tiotropium Bromide Monohydrate (SPIRIVA RESPIMAT) 2.5 MCG/ACT aerosol solution, Inhale 5 mg Daily., Disp: 1 inhaler, Rfl: 2  Allergies    Allergen Reactions   • Polymyxin B-Trimethoprim    • Sulfa Antibiotics Other (See Comments)     Cannot recall reaction         Review of Systems   Constitution: Negative.   HENT: Negative.    Eyes: Negative.    Cardiovascular: Negative.    Respiratory: Negative.    Endocrine: Negative.    Hematologic/Lymphatic: Negative.    Skin: Negative.    Musculoskeletal: Negative.    Gastrointestinal: Negative.    Genitourinary: Negative.    Neurological: Negative.    Psychiatric/Behavioral: Negative.        Vitals:    03/28/18 1336   BP: 141/74   Pulse: 86   SpO2: 100%       Objective     Physical Exam   Constitutional: He is oriented to person, place, and time. Vital signs are normal. He appears well-developed.   HENT:   Head: Normocephalic and atraumatic.   Neck: Normal range of motion. Neck supple.   Cardiovascular: Normal rate, regular rhythm, normal heart sounds and intact distal pulses.    No murmur heard.  Pulmonary/Chest: Effort normal and breath sounds normal. He has no wheezes. He has no rhonchi. He has no rales. He exhibits no tenderness.   Abdominal: Soft. There is no tenderness.   Musculoskeletal: Normal range of motion. He exhibits no edema or tenderness.   Neurological: He is alert and oriented to person, place, and time. He has normal strength.   Skin: Skin is warm and dry. No rash noted. No cyanosis or erythema.   Psychiatric: He has a normal mood and affect. His behavior is normal.       Review of Radiographic Studies:    CXR      Assessment/Plan   Diagnoses and all orders for this visit:    Primary spontaneous pneumothorax        SUMMARY  Louis Villalba has been doing well since he was last seen. Chest x-ray shows no reoccurrence of the left-sided pneumothorax.  He denies any new complaints or concerns, such as shortness of breath or pleuritic chest pain.  He will only need to follow-up with us as needed for any problems or concerns.  Instructed if he should develop similar symptoms, such as the pleuritic  chest pain or shortness of breath, he is to contact our office or go to the emergency room.    Tracey Quiroga, APRN  03/28/18  1:58 PM

## 2018-03-30 ENCOUNTER — APPOINTMENT (OUTPATIENT)
Dept: GENERAL RADIOLOGY | Facility: HOSPITAL | Age: 83
End: 2018-03-30

## 2018-05-30 ENCOUNTER — LAB (OUTPATIENT)
Dept: LAB | Facility: HOSPITAL | Age: 83
End: 2018-05-30
Attending: INTERNAL MEDICINE

## 2018-05-30 ENCOUNTER — TRANSCRIBE ORDERS (OUTPATIENT)
Dept: ADMINISTRATIVE | Facility: HOSPITAL | Age: 83
End: 2018-05-30

## 2018-05-30 ENCOUNTER — HOSPITAL ENCOUNTER (OUTPATIENT)
Dept: CT IMAGING | Facility: HOSPITAL | Age: 83
Discharge: HOME | End: 2018-05-30
Attending: INTERNAL MEDICINE | Admitting: INTERNAL MEDICINE

## 2018-05-30 DIAGNOSIS — E87.5 HYPERPOTASSEMIA: ICD-10-CM

## 2018-05-30 DIAGNOSIS — N18.30 CHRONIC KIDNEY DISEASE, STAGE III (MODERATE) (HCC): ICD-10-CM

## 2018-05-30 DIAGNOSIS — E87.5 HYPERPOTASSEMIA: Primary | ICD-10-CM

## 2018-05-30 DIAGNOSIS — R91.8 LUNG NODULES: ICD-10-CM

## 2018-05-30 LAB
ANION GAP SERPL CALCULATED.3IONS-SCNC: 13.7 MMOL/L
BILIRUB UR QL STRIP: NEGATIVE
BUN BLD-MCNC: 22 MG/DL (ref 8–23)
BUN/CREAT SERPL: 15.3 (ref 7–25)
CALCIUM SPEC-SCNC: 8.9 MG/DL (ref 8.6–10.5)
CHLORIDE SERPL-SCNC: 99 MMOL/L (ref 98–107)
CLARITY UR: CLEAR
CO2 SERPL-SCNC: 24.3 MMOL/L (ref 22–29)
COLOR UR: YELLOW
CREAT BLD-MCNC: 1.44 MG/DL (ref 0.76–1.27)
GFR SERPL CREATININE-BSD FRML MDRD: 47 ML/MIN/1.73
GLUCOSE BLD-MCNC: 91 MG/DL (ref 65–99)
GLUCOSE UR STRIP-MCNC: NEGATIVE MG/DL
HGB UR QL STRIP.AUTO: NEGATIVE
KETONES UR QL STRIP: NEGATIVE
LEUKOCYTE ESTERASE UR QL STRIP.AUTO: NEGATIVE
NITRITE UR QL STRIP: NEGATIVE
PH UR STRIP.AUTO: 5.5 [PH] (ref 5–8)
POTASSIUM BLD-SCNC: 5 MMOL/L (ref 3.5–5.2)
PROT UR QL STRIP: NEGATIVE
SODIUM BLD-SCNC: 137 MMOL/L (ref 136–145)
SP GR UR STRIP: 1.02 (ref 1–1.03)
UROBILINOGEN UR QL STRIP: NORMAL

## 2018-05-30 PROCEDURE — 71250 CT THORAX DX C-: CPT

## 2018-05-30 PROCEDURE — 36415 COLL VENOUS BLD VENIPUNCTURE: CPT

## 2018-05-30 PROCEDURE — 81003 URINALYSIS AUTO W/O SCOPE: CPT

## 2018-05-30 PROCEDURE — 80048 BASIC METABOLIC PNL TOTAL CA: CPT

## 2018-06-05 RX ORDER — PANTOPRAZOLE SODIUM 40 MG/1
TABLET, DELAYED RELEASE ORAL
Qty: 90 TABLET | Refills: 1 | Status: SHIPPED | OUTPATIENT
Start: 2018-06-05 | End: 2019-11-29 | Stop reason: SDUPTHER

## 2018-06-11 ENCOUNTER — TRANSCRIBE ORDERS (OUTPATIENT)
Dept: ADMINISTRATIVE | Facility: HOSPITAL | Age: 83
End: 2018-06-11

## 2018-06-11 DIAGNOSIS — R91.1 LUNG NODULE: Primary | ICD-10-CM

## 2019-01-02 ENCOUNTER — HOSPITAL ENCOUNTER (OUTPATIENT)
Dept: CT IMAGING | Facility: HOSPITAL | Age: 84
Discharge: HOME | End: 2019-01-02
Attending: INTERNAL MEDICINE | Admitting: INTERNAL MEDICINE

## 2019-01-02 DIAGNOSIS — R91.1 LUNG NODULE: ICD-10-CM

## 2019-01-02 PROCEDURE — 71250 CT THORAX DX C-: CPT

## 2019-01-28 ENCOUNTER — TRANSCRIBE ORDERS (OUTPATIENT)
Dept: ADMINISTRATIVE | Facility: HOSPITAL | Age: 84
End: 2019-01-28

## 2019-01-28 DIAGNOSIS — R91.1 LUNG NODULE: Primary | ICD-10-CM

## 2019-06-21 ENCOUNTER — TRANSCRIBE ORDERS (OUTPATIENT)
Dept: LAB | Facility: HOSPITAL | Age: 84
End: 2019-06-21

## 2019-06-21 ENCOUNTER — LAB (OUTPATIENT)
Dept: LAB | Facility: HOSPITAL | Age: 84
End: 2019-06-21

## 2019-06-21 DIAGNOSIS — N18.30 CHRONIC KIDNEY DISEASE, STAGE III (MODERATE) (HCC): ICD-10-CM

## 2019-06-21 DIAGNOSIS — I12.9 HYPERTENSIVE NEPHROPATHY: ICD-10-CM

## 2019-06-21 DIAGNOSIS — N18.30 CHRONIC KIDNEY DISEASE, STAGE III (MODERATE) (HCC): Primary | ICD-10-CM

## 2019-06-21 LAB
ANION GAP SERPL CALCULATED.3IONS-SCNC: 12.8 MMOL/L
BACTERIA UR QL AUTO: NORMAL /HPF
BASOPHILS # BLD AUTO: 0.03 10*3/MM3 (ref 0–0.2)
BASOPHILS NFR BLD AUTO: 0.4 % (ref 0–1.5)
BILIRUB UR QL STRIP: NEGATIVE
BUN BLD-MCNC: 16 MG/DL (ref 8–23)
BUN/CREAT SERPL: 11.8 (ref 7–25)
CALCIUM SPEC-SCNC: 9.4 MG/DL (ref 8.6–10.5)
CHLORIDE SERPL-SCNC: 102 MMOL/L (ref 98–107)
CLARITY UR: CLEAR
CO2 SERPL-SCNC: 25.2 MMOL/L (ref 22–29)
COLOR UR: ABNORMAL
CREAT BLD-MCNC: 1.36 MG/DL (ref 0.76–1.27)
CREAT UR-MCNC: 176.2 MG/DL
DEPRECATED RDW RBC AUTO: 48.9 FL (ref 37–54)
EOSINOPHIL # BLD AUTO: 0.39 10*3/MM3 (ref 0–0.4)
EOSINOPHIL NFR BLD AUTO: 5.8 % (ref 0.3–6.2)
ERYTHROCYTE [DISTWIDTH] IN BLOOD BY AUTOMATED COUNT: 13.5 % (ref 12.3–15.4)
GFR SERPL CREATININE-BSD FRML MDRD: 50 ML/MIN/1.73
GLUCOSE BLD-MCNC: 187 MG/DL (ref 65–99)
GLUCOSE UR STRIP-MCNC: NEGATIVE MG/DL
HCT VFR BLD AUTO: 41.4 % (ref 37.5–51)
HGB BLD-MCNC: 13.4 G/DL (ref 13–17.7)
HGB UR QL STRIP.AUTO: NEGATIVE
HYALINE CASTS UR QL AUTO: NORMAL /LPF
IMM GRANULOCYTES # BLD AUTO: 0.01 10*3/MM3 (ref 0–0.05)
IMM GRANULOCYTES NFR BLD AUTO: 0.1 % (ref 0–0.5)
KETONES UR QL STRIP: NEGATIVE
LEUKOCYTE ESTERASE UR QL STRIP.AUTO: NEGATIVE
LYMPHOCYTES # BLD AUTO: 1.65 10*3/MM3 (ref 0.7–3.1)
LYMPHOCYTES NFR BLD AUTO: 24.4 % (ref 19.6–45.3)
MCH RBC QN AUTO: 32 PG (ref 26.6–33)
MCHC RBC AUTO-ENTMCNC: 32.4 G/DL (ref 31.5–35.7)
MCV RBC AUTO: 98.8 FL (ref 79–97)
MONOCYTES # BLD AUTO: 0.88 10*3/MM3 (ref 0.1–0.9)
MONOCYTES NFR BLD AUTO: 13 % (ref 5–12)
NEUTROPHILS # BLD AUTO: 3.81 10*3/MM3 (ref 1.7–7)
NEUTROPHILS NFR BLD AUTO: 56.3 % (ref 42.7–76)
NITRITE UR QL STRIP: NEGATIVE
NRBC BLD AUTO-RTO: 0 /100 WBC (ref 0–0.2)
PH UR STRIP.AUTO: <=5 [PH] (ref 5–8)
PLATELET # BLD AUTO: 182 10*3/MM3 (ref 140–450)
PMV BLD AUTO: 10.7 FL (ref 6–12)
POTASSIUM BLD-SCNC: 5.4 MMOL/L (ref 3.5–5.2)
PROT UR QL STRIP: ABNORMAL
PROT UR-MCNC: 55 MG/DL
PROT/CREAT UR: 312.1 MG/G CREA (ref 0–200)
RBC # BLD AUTO: 4.19 10*6/MM3 (ref 4.14–5.8)
RBC # UR: NORMAL /HPF
REF LAB TEST METHOD: NORMAL
SODIUM BLD-SCNC: 140 MMOL/L (ref 136–145)
SP GR UR STRIP: 1.02 (ref 1–1.03)
SQUAMOUS #/AREA URNS HPF: NORMAL /HPF
UROBILINOGEN UR QL STRIP: ABNORMAL
WBC NRBC COR # BLD: 6.77 10*3/MM3 (ref 3.4–10.8)
WBC UR QL AUTO: NORMAL /HPF

## 2019-06-21 PROCEDURE — 82570 ASSAY OF URINE CREATININE: CPT

## 2019-06-21 PROCEDURE — 36415 COLL VENOUS BLD VENIPUNCTURE: CPT

## 2019-06-21 PROCEDURE — 80048 BASIC METABOLIC PNL TOTAL CA: CPT

## 2019-06-21 PROCEDURE — 85025 COMPLETE CBC W/AUTO DIFF WBC: CPT

## 2019-06-21 PROCEDURE — 81001 URINALYSIS AUTO W/SCOPE: CPT

## 2019-06-21 PROCEDURE — 84156 ASSAY OF PROTEIN URINE: CPT

## 2019-11-29 RX ORDER — PANTOPRAZOLE SODIUM 40 MG/1
TABLET, DELAYED RELEASE ORAL
Qty: 30 TABLET | Refills: 0 | Status: SHIPPED | OUTPATIENT
Start: 2019-11-29 | End: 2019-12-30 | Stop reason: SDUPTHER

## 2019-12-16 ENCOUNTER — HOSPITAL ENCOUNTER (OUTPATIENT)
Dept: CT IMAGING | Facility: HOSPITAL | Age: 84
Discharge: HOME | End: 2019-12-16
Attending: INTERNAL MEDICINE | Admitting: INTERNAL MEDICINE

## 2019-12-16 DIAGNOSIS — R91.1 LUNG NODULE: ICD-10-CM

## 2019-12-16 PROCEDURE — 71250 CT THORAX DX C-: CPT

## 2019-12-30 ENCOUNTER — OFFICE VISIT (OUTPATIENT)
Dept: FAMILY MEDICINE CLINIC | Facility: CLINIC | Age: 84
End: 2019-12-30

## 2019-12-30 VITALS
OXYGEN SATURATION: 98 % | SYSTOLIC BLOOD PRESSURE: 126 MMHG | TEMPERATURE: 97.8 F | DIASTOLIC BLOOD PRESSURE: 78 MMHG | WEIGHT: 114 LBS | BODY MASS INDEX: 18.99 KG/M2 | HEART RATE: 84 BPM | HEIGHT: 65 IN

## 2019-12-30 DIAGNOSIS — I10 ESSENTIAL HYPERTENSION: Primary | ICD-10-CM

## 2019-12-30 DIAGNOSIS — K21.9 GASTROESOPHAGEAL REFLUX DISEASE WITHOUT ESOPHAGITIS: ICD-10-CM

## 2019-12-30 DIAGNOSIS — N40.0 BENIGN PROSTATIC HYPERPLASIA WITHOUT LOWER URINARY TRACT SYMPTOMS: ICD-10-CM

## 2019-12-30 DIAGNOSIS — E78.2 MIXED HYPERLIPIDEMIA: ICD-10-CM

## 2019-12-30 PROBLEM — E78.5 HYPERLIPIDEMIA: Status: ACTIVE | Noted: 2019-12-30

## 2019-12-30 PROBLEM — J93.11 PRIMARY SPONTANEOUS PNEUMOTHORAX: Status: RESOLVED | Noted: 2017-10-03 | Resolved: 2019-12-30

## 2019-12-30 PROBLEM — J44.1 ACUTE EXACERBATION OF CHRONIC OBSTRUCTIVE PULMONARY DISEASE (COPD) (HCC): Status: RESOLVED | Noted: 2017-12-24 | Resolved: 2019-12-30

## 2019-12-30 PROBLEM — J10.1 INFLUENZA B: Status: RESOLVED | Noted: 2017-12-24 | Resolved: 2019-12-30

## 2019-12-30 PROBLEM — J44.1 COPD EXACERBATION (HCC): Status: RESOLVED | Noted: 2018-03-04 | Resolved: 2019-12-30

## 2019-12-30 PROCEDURE — 99204 OFFICE O/P NEW MOD 45 MIN: CPT | Performed by: INTERNAL MEDICINE

## 2019-12-30 RX ORDER — PANTOPRAZOLE SODIUM 40 MG/1
40 TABLET, DELAYED RELEASE ORAL
Qty: 90 TABLET | Refills: 1 | Status: SHIPPED | OUTPATIENT
Start: 2019-12-30 | End: 2020-05-13

## 2019-12-30 RX ORDER — ATORVASTATIN CALCIUM 10 MG/1
10 TABLET, FILM COATED ORAL DAILY
Qty: 90 TABLET | Refills: 1 | Status: SHIPPED | OUTPATIENT
Start: 2019-12-30 | End: 2020-09-08

## 2019-12-30 RX ORDER — TAMSULOSIN HYDROCHLORIDE 0.4 MG/1
1 CAPSULE ORAL DAILY
Qty: 90 CAPSULE | Refills: 1 | Status: SHIPPED | OUTPATIENT
Start: 2019-12-30 | End: 2020-09-08

## 2019-12-30 NOTE — PROGRESS NOTES
Subjective   Louis Villalba is a 87 y.o. male.     Chief Complaint   Patient presents with   • Hypertension       History of Present Illness   sister was present during the history-taking and subsequent discussion (and for part of the physical exam) with this patient.  Patient agrees to the presence of the individual during this visit.    Follow-up for cholesterol.  Currently has been feeling well without any myalgias, muscle aches, weakness, numbness, chest pain, short of breath or other issues.  Currently is adherent with medication regimen and denies medication side effects. Is due for lab follow-up.  Follow-up for hypertension.  Currently has been feeling well and asymptomatic without any headaches,vision changes, cough, chest pain, shortness of breath, swelling, focal neurologic deficit, memory loss or syncope.  Has been taking the medications regularly and adherent with the regimen, Denies medication side effects and no significant interval events.    GERD is controlled with the pantoprazole and when misses has significant symptoms.  Has BPH with frequency and nocturia but only 1-2 times per night.  Continues with the tamsulosin and is helping quite a bit.  The following portions of the patient's history were reviewed and updated as appropriate: allergies, current medications, past family history, past medical history, past social history, past surgical history and problem list.    Depression Screen:  PHQ-2/PHQ-9 Depression Screening 12/30/2019   Little interest or pleasure in doing things 0   Feeling down, depressed, or hopeless 0   Total Score 0       Past Medical History:   Diagnosis Date   • Anemia    • Arthritis    • Cancer (CMS/HCC)     skin: BASAL CELL on face, excised- remote   • Colon polyp    • History of transfusion    • Hyperlipidemia    • Macular degeneration        Past Surgical History:   Procedure Laterality Date   • APPENDECTOMY  1958   • COLONOSCOPY  2013   • ENDOSCOPY N/A 10/5/2016     Procedure: ESOPHAGOGASTRODUODENOSCOPY with biopsy;  Surgeon: Edward Gan MD;  Location: [facility] ENDOSCOPY;  Service:    • EYE SURGERY      cataract b/l surgery   • SKIN BIOPSY         Family History   Problem Relation Age of Onset   • No Known Problems Mother    • No Known Problems Father        Social History     Socioeconomic History   • Marital status:      Spouse name: Not on file   • Number of children: Not on file   • Years of education: Not on file   • Highest education level: Not on file   Tobacco Use   • Smoking status: Former Smoker     Packs/day: 1.50     Years: 10.00     Pack years: 15.00     Types: Cigarettes     Start date: 12/24/2000     Last attempt to quit: 2010     Years since quitting: 10.0   • Smokeless tobacco: Never Used   Substance and Sexual Activity   • Alcohol use: Yes     Alcohol/week: 21.0 standard drinks     Types: 21 Cans of beer per week     Comment: daily   • Drug use: No   • Sexual activity: Defer       Current Outpatient Medications   Medication Sig Dispense Refill   • acetaminophen (TYLENOL) 325 MG tablet Take 2 tablets by mouth Every 6 (Six) Hours As Needed for Mild Pain .     • albuterol (ACCUNEB) 0.63 MG/3ML nebulizer solution Take 3 mL by nebulization Every 6 (Six) Hours As Needed for Wheezing or Shortness of Air. 360 mL 2   • albuterol (PROVENTIL HFA;VENTOLIN HFA) 108 (90 Base) MCG/ACT inhaler Inhale 2 puffs Every 4 (Four) Hours As Needed for Wheezing. 2 inhaler 0   • albuterol (PROVENTIL) (2.5 MG/3ML) 0.083% nebulizer solution Take 2.5 mg by nebulization 4 (Four) Times a Day As Needed for Wheezing. 125 vial 2   • aspirin 81 MG chewable tablet Chew 81 mg Daily.     • atorvastatin (LIPITOR) 10 MG tablet Take 1 tablet by mouth Daily. 90 tablet 1   • brimonidine-timolol (COMBIGAN) 0.2-0.5 % ophthalmic solution Administer 1 drop to both eyes Every 12 (Twelve) Hours.     • Multiple Vitamins-Minerals (VITEYES AREDS FORMULA PO) Take 2 tablets by mouth Daily.     •  "pantoprazole (PROTONIX) 40 MG EC tablet Take 1 tablet by mouth every night at bedtime. 90 tablet 1   • tamsulosin (FLOMAX) 0.4 MG capsule 24 hr capsule Take 1 capsule by mouth Daily. 90 capsule 1   • Tiotropium Bromide Monohydrate (SPIRIVA RESPIMAT) 2.5 MCG/ACT aerosol solution Inhale 5 mg Daily. 1 inhaler 2     No current facility-administered medications for this visit.        Review of Systems   Constitutional: Negative for activity change, appetite change, fatigue, fever, unexpected weight gain and unexpected weight loss.   HENT: Positive for hearing loss. Negative for nosebleeds, rhinorrhea, trouble swallowing and voice change.    Eyes: Positive for visual disturbance.   Respiratory: Negative for cough, chest tightness, shortness of breath and wheezing.    Cardiovascular: Negative for chest pain, palpitations and leg swelling.   Gastrointestinal: Negative for abdominal pain, blood in stool, constipation, diarrhea, nausea, vomiting, GERD and indigestion.   Genitourinary: Negative for dysuria, frequency and hematuria.   Musculoskeletal: Negative for arthralgias, back pain and myalgias.   Skin: Negative for rash and bruise.   Neurological: Negative for dizziness, tremors, weakness, light-headedness, numbness, headache and memory problem.   Hematological: Negative for adenopathy. Does not bruise/bleed easily.   Psychiatric/Behavioral: Negative for sleep disturbance and depressed mood. The patient is not nervous/anxious.        Objective   /78   Pulse 84   Temp 97.8 °F (36.6 °C)   Ht 165.1 cm (65\")   Wt 51.7 kg (114 lb)   SpO2 98%   BMI 18.97 kg/m²     Physical Exam   Constitutional: He is oriented to person, place, and time. He appears well-developed and well-nourished. No distress.   HENT:   Head: Normocephalic and atraumatic.   Right Ear: External ear normal.   Left Ear: External ear normal.   Nose: Nose normal.   Mouth/Throat: Oropharynx is clear and moist.   Using hearing aids bilaterally.   Eyes: " Pupils are equal, round, and reactive to light. Conjunctivae and EOM are normal.   Significant vision loss bilaterally   Neck: Normal range of motion. Neck supple. No tracheal deviation present. No thyromegaly present.   Cardiovascular: Normal rate, regular rhythm, normal heart sounds and intact distal pulses. Exam reveals no gallop and no friction rub.   No murmur heard.  Pulmonary/Chest: Effort normal and breath sounds normal. No respiratory distress.   Abdominal: Soft. Bowel sounds are normal. He exhibits no mass. There is no tenderness. There is no guarding.   Musculoskeletal: Normal range of motion. He exhibits no edema.   Lymphadenopathy:     He has no cervical adenopathy.   Neurological: He is alert and oriented to person, place, and time. He displays normal reflexes. He exhibits normal muscle tone.   Skin: Skin is warm and dry. Capillary refill takes less than 2 seconds. No rash noted. He is not diaphoretic.   Psychiatric: He has a normal mood and affect. His behavior is normal. Judgment and thought content normal.   Nursing note and vitals reviewed.      No results found for this or any previous visit (from the past 2016 hour(s)).  Assessment/Plan   Louis was seen today for hypertension.    Diagnoses and all orders for this visit:    Essential hypertension  -     Comprehensive Metabolic Panel  -     Lipid Panel    Mixed hyperlipidemia  -     atorvastatin (LIPITOR) 10 MG tablet; Take 1 tablet by mouth Daily.  -     Comprehensive Metabolic Panel  -     Lipid Panel    Gastroesophageal reflux disease without esophagitis  -     pantoprazole (PROTONIX) 40 MG EC tablet; Take 1 tablet by mouth every night at bedtime.    Benign prostatic hyperplasia without lower urinary tract symptoms  -     tamsulosin (FLOMAX) 0.4 MG capsule 24 hr capsule; Take 1 capsule by mouth Daily.    Reviewed history with patient and his sister.  We will check the labs as noted above I am not too confident that his cholesterol is ideally  controlled we will check the Lipitor dosing based upon his lipid panel that has been ordered.  Continue the pantoprazole for his stomach and the tamsulosin for his prostate.  No further treatment or testing for BPH is indicated other than symptomatic.  Blood pressure is currently controlled no changes are needed and no medication is needed at this time.

## 2019-12-31 DIAGNOSIS — K21.9 GASTROESOPHAGEAL REFLUX DISEASE WITHOUT ESOPHAGITIS: ICD-10-CM

## 2019-12-31 LAB
ALBUMIN SERPL-MCNC: 4.4 G/DL (ref 3.5–4.7)
ALBUMIN/GLOB SERPL: 1.4 {RATIO} (ref 1.2–2.2)
ALP SERPL-CCNC: 113 IU/L (ref 39–117)
ALT SERPL-CCNC: 12 IU/L (ref 0–44)
AST SERPL-CCNC: 24 IU/L (ref 0–40)
BILIRUB SERPL-MCNC: 0.4 MG/DL (ref 0–1.2)
BUN SERPL-MCNC: 17 MG/DL (ref 8–27)
BUN/CREAT SERPL: 14 (ref 10–24)
CALCIUM SERPL-MCNC: 9.2 MG/DL (ref 8.6–10.2)
CHLORIDE SERPL-SCNC: 103 MMOL/L (ref 96–106)
CHOLEST SERPL-MCNC: 149 MG/DL (ref 100–199)
CO2 SERPL-SCNC: 21 MMOL/L (ref 20–29)
CREAT SERPL-MCNC: 1.24 MG/DL (ref 0.76–1.27)
GLOBULIN SER CALC-MCNC: 3.1 G/DL (ref 1.5–4.5)
GLUCOSE SERPL-MCNC: 92 MG/DL (ref 65–99)
HDLC SERPL-MCNC: 67 MG/DL
LDLC SERPL CALC-MCNC: 65 MG/DL (ref 0–99)
POTASSIUM SERPL-SCNC: 5.2 MMOL/L (ref 3.5–5.2)
PROT SERPL-MCNC: 7.5 G/DL (ref 6–8.5)
SODIUM SERPL-SCNC: 140 MMOL/L (ref 134–144)
TRIGL SERPL-MCNC: 86 MG/DL (ref 0–149)
VLDLC SERPL CALC-MCNC: 17 MG/DL (ref 5–40)

## 2019-12-31 RX ORDER — PANTOPRAZOLE SODIUM 40 MG/1
TABLET, DELAYED RELEASE ORAL
Qty: 30 TABLET | OUTPATIENT
Start: 2019-12-31

## 2020-01-27 ENCOUNTER — OFFICE VISIT (OUTPATIENT)
Dept: FAMILY MEDICINE CLINIC | Facility: CLINIC | Age: 85
End: 2020-01-27

## 2020-01-27 VITALS
DIASTOLIC BLOOD PRESSURE: 70 MMHG | BODY MASS INDEX: 18.99 KG/M2 | HEIGHT: 65 IN | HEART RATE: 76 BPM | OXYGEN SATURATION: 100 % | SYSTOLIC BLOOD PRESSURE: 132 MMHG | WEIGHT: 114 LBS

## 2020-01-27 DIAGNOSIS — Z00.00 MEDICARE ANNUAL WELLNESS VISIT, SUBSEQUENT: Primary | ICD-10-CM

## 2020-01-27 DIAGNOSIS — H81.10 BENIGN PAROXYSMAL POSITIONAL VERTIGO, UNSPECIFIED LATERALITY: ICD-10-CM

## 2020-01-27 PROCEDURE — 99213 OFFICE O/P EST LOW 20 MIN: CPT | Performed by: INTERNAL MEDICINE

## 2020-01-27 PROCEDURE — 96160 PT-FOCUSED HLTH RISK ASSMT: CPT | Performed by: INTERNAL MEDICINE

## 2020-01-27 PROCEDURE — G0439 PPPS, SUBSEQ VISIT: HCPCS | Performed by: INTERNAL MEDICINE

## 2020-01-27 RX ORDER — MECLIZINE HYDROCHLORIDE 25 MG/1
25 TABLET ORAL 3 TIMES DAILY PRN
Qty: 30 TABLET | Refills: 0 | Status: SHIPPED | OUTPATIENT
Start: 2020-01-27 | End: 2020-02-12 | Stop reason: SDUPTHER

## 2020-01-27 RX ORDER — DOCUSATE SODIUM 100 MG/1
100 CAPSULE, LIQUID FILLED ORAL 2 TIMES DAILY
COMMUNITY

## 2020-01-27 RX ORDER — ERYTHROMYCIN 5 MG/G
OINTMENT OPHTHALMIC NIGHTLY
COMMUNITY
End: 2021-10-11 | Stop reason: ALTCHOICE

## 2020-01-27 RX ORDER — OFLOXACIN 3 MG/ML
SOLUTION/ DROPS OPHTHALMIC
COMMUNITY
Start: 2020-01-08

## 2020-01-27 NOTE — PATIENT INSTRUCTIONS
Medicare Wellness  Personal Prevention Plan of Service     Date of Office Visit:  2020  Encounter Provider:  Adam Feliz MD  Place of Service:  [facility] PRIMARY [facility]  Patient Name: Louis Villalba  :  10/20/1932    As part of the Medicare Wellness portion of your visit today, we are providing you with this personalized preventive plan of services (PPPS). This plan is based upon recommendations of the [place] Preventive Services Task Force (USPSTF) and the Advisory Committee on Immunization Practices (ACIP).    This lists the preventive care services that should be considered, and provides dates of when you are due. Items listed as completed are up-to-date and do not require any further intervention.    Health Maintenance   Topic Date Due   • TDAP/TD VACCINES (1 - Tdap) 10/20/1943   • ZOSTER VACCINE (1 of 2) 10/20/1982   • PNEUMOCOCCAL VACCINE (65+ HIGH RISK) (1 of 2 - PCV13) 10/20/1997   • MEDICARE ANNUAL WELLNESS  10/10/2019   • LIPID PANEL  2020   • INFLUENZA VACCINE  Completed       No orders of the defined types were placed in this encounter.      No follow-ups on file.

## 2020-01-27 NOTE — PROGRESS NOTES
Subsequent Medicare Wellness Visit   The ABC's of the Annual Wellness Visit    Chief Complaint   Patient presents with   • Dizziness   • Annual Exam       HPI:  Louis Villalba, -10/20/1932, is a 87 y.o. male who presents for a Subsequent Medicare Wellness Visit.  Increasing frequency of dizziness for the last 2 weeks.  It was going away but now is always there but not always severe.  No head trauma, N, D,V, C, rash, syncope, CP or SOA.  Seems worse when change position and when rolls over to the right side.    Recent Hospitalizations:  No hospitalization(s) within the last year..    Current Medical Providers:  Patient Care Team:  Adam Feliz MD as PCP - General (Internal Medicine)  Joe Davis OD (Optometry)  Stef Claire MD as Consulting Physician (Ophthalmology)  Eric Nuñez MD as Consulting Physician (Nephrology)  Hilton Chavarria MD as Consulting Physician (Pulmonary Disease)    Health Habits and Functional and Cognitive Screening and Depression Screening:  Functional & Cognitive Status 2020   Do you have difficulty preparing food and eating? Yes   Do you have difficulty bathing yourself, getting dressed or grooming yourself? No   Do you have difficulty using the toilet? No   Do you have difficulty moving around from place to place? No   Do you have trouble with steps or getting out of a bed or a chair? No   Current Diet Well Balanced Diet   Exercise (times per week) 2 times per week   Current Exercise Activities Include Cardiovasular Workout on Exercise Equipment   Do you need help using the phone?  No   Are you deaf or do you have serious difficulty hearing?  Yes   Do you need help with transportation? Yes   Do you need help shopping? No   Do you need help preparing meals?  Yes   Do you need help with housework?  No   Do you need help with laundry? No   Do you need help taking your medications? No   Do you need help managing money? No   Do you ever drive or ride in  a car without wearing a seat belt? No   Have you felt unusual stress, anger or loneliness in the last month? No   Who do you live with? Alone   If you need help, do you have trouble finding someone available to you? No   Have you been bothered in the last four weeks by sexual problems? No   Do you have difficulty concentrating, remembering or making decisions? No           BILLYADI Fall Risk Clinician Key Questions   Have you fallen in the past year?: Yes(tripped due to vision)  Do you feel unsteady with walking?: No  Are you worried about falling?: No  Stay Idependant Patient Questions   Have you been advised to use a cane or a walker to get around safely?: No  Do you steady yourself by holding onto furniture when walking at home?: No  Do you need to push with your hands to stand up from a chair?: No  Do you have trouble stepping up onto a curb?: No  Do you have to rush to the toilet?: No  Have you lost some feeling in your feet?: No  Do you take medicine that sometimes makes you feel light headed or more tired than usual?: No  Do you take medicine to help you sleep or improve your mood?: No  Do you often feel sad or depressed?: No  Patient Fall Risk Assessment Score : 0      Compared to one year ago, the patient feels his physical health is worse and his mental health is the same.    Depression Screen:  PHQ-2/PHQ-9 Depression Screening 1/27/2020   Little interest or pleasure in doing things 0   Feeling down, depressed, or hopeless 0   Trouble falling or staying asleep, or sleeping too much 0   Feeling tired or having little energy 1   Poor appetite or overeating 0   Feeling bad about yourself - or that you are a failure or have let yourself or your family down 0   Trouble concentrating on things, such as reading the newspaper or watching television 0   Moving or speaking so slowly that other people could have noticed. Or the opposite - being so fidgety or restless that you have been moving around a lot more than  usual 0   Thoughts that you would be better off dead, or of hurting yourself in some way 0   Total Score 1       Falls Risk Assessment:  ANDREIA Fall Risk Clinician Key Questions   Have you fallen in the past year?: Yes(tripped due to vision)  Do you feel unsteady with walking?: No  Are you worried about falling?: No      Past Medical/Family/Social History:  The following portions of the patient's history were reviewed and updated as appropriate: allergies, current medications, past family history, past medical history, past social history, past surgical history and problem list.    Allergies   Allergen Reactions   • Polymyxin B-Trimethoprim Unknown - Low Severity   • Sulfa Antibiotics Other (See Comments)     Cannot recall reaction         Current Outpatient Medications:   •  acetaminophen (TYLENOL) 325 MG tablet, Take 2 tablets by mouth Every 6 (Six) Hours As Needed for Mild Pain ., Disp: , Rfl:   •  albuterol (PROVENTIL HFA;VENTOLIN HFA) 108 (90 Base) MCG/ACT inhaler, Inhale 2 puffs Every 4 (Four) Hours As Needed for Wheezing., Disp: 2 inhaler, Rfl: 0  •  albuterol (PROVENTIL) (2.5 MG/3ML) 0.083% nebulizer solution, Take 2.5 mg by nebulization 4 (Four) Times a Day As Needed for Wheezing., Disp: 125 vial, Rfl: 2  •  atorvastatin (LIPITOR) 10 MG tablet, Take 1 tablet by mouth Daily., Disp: 90 tablet, Rfl: 1  •  brimonidine-timolol (COMBIGAN) 0.2-0.5 % ophthalmic solution, Administer 1 drop to both eyes Every 12 (Twelve) Hours., Disp: , Rfl:   •  docusate sodium (COLACE) 100 MG capsule, Take 100 mg by mouth 2 (Two) Times a Day., Disp: , Rfl:   •  erythromycin (ROMYCIN) 5 MG/GM ophthalmic ointment, Every Night., Disp: , Rfl:   •  Multiple Vitamins-Minerals (VITEYES AREDS FORMULA PO), Take 2 tablets by mouth Daily., Disp: , Rfl:   •  ofloxacin (OCUFLOX) 0.3 % ophthalmic solution, , Disp: , Rfl:   •  pantoprazole (PROTONIX) 40 MG EC tablet, Take 1 tablet by mouth every night at bedtime., Disp: 90 tablet, Rfl: 1  •   tamsulosin (FLOMAX) 0.4 MG capsule 24 hr capsule, Take 1 capsule by mouth Daily., Disp: 90 capsule, Rfl: 1  •  Tiotropium Bromide Monohydrate (SPIRIVA RESPIMAT) 2.5 MCG/ACT aerosol solution, Inhale 5 mg Daily., Disp: 1 inhaler, Rfl: 2  •  meclizine (ANTIVERT) 25 MG tablet, Take 1 tablet by mouth 3 (Three) Times a Day As Needed for Dizziness., Disp: 30 tablet, Rfl: 0    Aspirin use counseling: Does not need ASA (and currently is not on it)    Current medication list contains no high risk medications.  No harmful drug interactions have been identified.     Family History   Problem Relation Age of Onset   • No Known Problems Mother    • No Known Problems Father        Social History     Tobacco Use   • Smoking status: Former Smoker     Packs/day: 1.50     Years: 10.00     Pack years: 15.00     Types: Cigarettes     Start date: 12/24/2000     Last attempt to quit: 2010     Years since quitting: 10.0   • Smokeless tobacco: Never Used   Substance Use Topics   • Alcohol use: Yes     Alcohol/week: 21.0 standard drinks     Types: 21 Cans of beer per week     Comment: daily       Past Surgical History:   Procedure Laterality Date   • APPENDECTOMY  1958   • COLONOSCOPY  2013   • ENDOSCOPY N/A 10/5/2016    Procedure: ESOPHAGOGASTRODUODENOSCOPY with biopsy;  Surgeon: Edward Gan MD;  Location: [facility] ENDOSCOPY;  Service:    • EYE SURGERY      cataract b/l surgery   • SKIN BIOPSY         Patient Active Problem List   Diagnosis   • Gastric outlet obstruction   • COPD (chronic obstructive pulmonary disease) (CMS/[facility])   • HTN (hypertension)   • Lung nodule   • CKD (chronic kidney disease) stage 3, GFR 30-59 ml/min (CMS/[facility])   • Benign prostatic hyperplasia without lower urinary tract symptoms   • Hyperkalemia   • Hyperlipidemia   • GERD (gastroesophageal reflux disease)   • Medicare annual wellness visit, subsequent   • BPPV (benign paroxysmal positional vertigo)       Review of Systems   Constitutional: Negative for  "activity change, appetite change, fatigue, fever, unexpected weight gain and unexpected weight loss.   HENT: Positive for hearing loss. Negative for nosebleeds, rhinorrhea, trouble swallowing and voice change.    Eyes: Negative for visual disturbance.   Respiratory: Negative for cough, chest tightness, shortness of breath and wheezing.    Cardiovascular: Negative for chest pain, palpitations and leg swelling.   Gastrointestinal: Negative for abdominal pain, blood in stool, constipation, diarrhea, nausea, vomiting, GERD and indigestion.   Genitourinary: Negative for dysuria, frequency and hematuria.   Musculoskeletal: Negative for arthralgias, back pain and myalgias.   Skin: Negative for rash and bruise.   Neurological: Positive for dizziness. Negative for tremors, weakness, light-headedness, numbness, headache and memory problem.   Hematological: Negative for adenopathy. Does not bruise/bleed easily.   Psychiatric/Behavioral: Negative for sleep disturbance and depressed mood. The patient is not nervous/anxious.        Objective     Vitals:    01/27/20 1356   BP: 132/70   BP Location: Right arm   Patient Position: Sitting   Cuff Size: Adult   Pulse: 76   SpO2: 100%   Weight: 51.7 kg (114 lb)   Height: 165.1 cm (65\")       Patient's Body mass index is 18.97 kg/m². BMI is below normal parameters. Recommendations include: none (medical contraindication).      No exam data present    The patient has no evidence of cognitve impairment.     Physical Exam   Constitutional: He is oriented to person, place, and time. He appears well-developed and well-nourished. No distress.   HENT:   Head: Normocephalic and atraumatic.   Right Ear: External ear normal.   Left Ear: External ear normal.   Nose: Nose normal.   Mouth/Throat: Oropharynx is clear and moist.   Hearing aids in place and canals clear bilaterally.  Incrased dizziness slightly with rolling to the right.   Eyes: Pupils are equal, round, and reactive to light. " Conjunctivae and EOM are normal.   Neck: Normal range of motion. Neck supple. No tracheal deviation present. No thyromegaly present.   Cardiovascular: Normal rate, regular rhythm, normal heart sounds and intact distal pulses. Exam reveals no gallop and no friction rub.   No murmur heard.  Pulmonary/Chest: Effort normal and breath sounds normal. No respiratory distress.   Abdominal: Soft. Bowel sounds are normal. He exhibits no mass. There is no tenderness. There is no guarding.   Musculoskeletal: Normal range of motion. He exhibits no edema.   Lymphadenopathy:     He has no cervical adenopathy.   Neurological: He is alert and oriented to person, place, and time. He displays normal reflexes. He exhibits normal muscle tone.   Skin: Skin is warm and dry. Capillary refill takes less than 2 seconds. No rash noted. He is not diaphoretic.   Psychiatric: He has a normal mood and affect. His behavior is normal. Judgment and thought content normal.   Nursing note and vitals reviewed.      Recent Lab Results:  Lab Results   Component Value Date    GLU 92 12/30/2019     Lab Results   Component Value Date    TRIG 86 12/30/2019    HDL 67 12/30/2019    VLDL 17 12/30/2019       Assessment/Plan   Age-appropriate Screening Schedule:  Refer to the list below for future screening recommendations based on patient's age, sex and/or medical conditions.      Health Maintenance   Topic Date Due   • TDAP/TD VACCINES (1 - Tdap) 10/20/1943   • ZOSTER VACCINE (1 of 2) 10/20/1982   • PNEUMOCOCCAL VACCINE (65+ HIGH RISK) (1 of 2 - PCV13) 10/20/1997   • LIPID PANEL  12/30/2020   • INFLUENZA VACCINE  Completed       Medicare Risks and Personalized Health Plan:  Advance Directive Discussion  Fall Risk  Glaucoma Risk  Immunizations Discussed/Encouraged (specific immunizations; adacel Tdap and Shingrix )      CMS-Preventive Services Quick Reference  Medicare Preventive Services Addressed:  Annual Wellness Visit (AWV)  Glaucoma screening (for  individuals with diabetes mellitus, family history of glaucoma, -Americans (> or =) age 50, -Americans (> or =) age 65)    Advance Care Planning:  Patient has an advance directive - a copy has not been provided. Have asked the patient to send this to us to add to record    Diagnoses and all orders for this visit:    1. Medicare annual wellness visit, subsequent (Primary)    2. Benign paroxysmal positional vertigo, unspecified laterality  -     meclizine (ANTIVERT) 25 MG tablet; Take 1 tablet by mouth 3 (Three) Times a Day As Needed for Dizziness.  Dispense: 30 tablet; Refill: 0      Annual wellness visit reviewed with patient.  All past history, medications, social history, and problem list were reviewed.  Discussed advanced directives and living will.  Patient has living will: Living will: yes and patient will bring copy to office.  Discussed fall risk and precautions encourage removing throw rugs and using grab bars within the home and bathroom. No need for screening labs as were ordered and done 12/19/19 the blood sugars, kidney, liver, cholesterol for screening.  Discussed flu shot recommended to get the high-dose influenza vaccine annually in the fall.  Prevnar-13 and pneumovax-23 up to date and appropriate.  Shingrix vaccination series recommended.  Encourage follow-up with the eye doctor on annual basis for glaucoma evaluation.  Discussed weight and encouraged exercise as tolerated while following a healthy diet.  Colon cancer screening needed secondary to age.  Follow up with current specialists as needed.    Will try meclizine and if persists then will have PT and vestibular therapy.    An After Visit Summary and PPPS with all of these plans were given to the patient.      Follow Up:  No follow-ups on file.

## 2020-02-12 ENCOUNTER — TELEPHONE (OUTPATIENT)
Dept: FAMILY MEDICINE CLINIC | Facility: CLINIC | Age: 85
End: 2020-02-12

## 2020-02-12 DIAGNOSIS — H81.10 BENIGN PAROXYSMAL POSITIONAL VERTIGO, UNSPECIFIED LATERALITY: ICD-10-CM

## 2020-02-12 DIAGNOSIS — H81.10 BENIGN PAROXYSMAL POSITIONAL VERTIGO, UNSPECIFIED LATERALITY: Primary | ICD-10-CM

## 2020-02-12 RX ORDER — MECLIZINE HYDROCHLORIDE 25 MG/1
25 TABLET ORAL 3 TIMES DAILY PRN
Qty: 60 TABLET | Refills: 0 | Status: SHIPPED | OUTPATIENT
Start: 2020-02-12 | End: 2020-03-06 | Stop reason: SDUPTHER

## 2020-02-12 RX ORDER — MECLIZINE HYDROCHLORIDE 25 MG/1
TABLET ORAL
Qty: 30 TABLET | Refills: 0 | OUTPATIENT
Start: 2020-02-12

## 2020-02-12 NOTE — TELEPHONE ENCOUNTER
Pt daughter called and stated that medication is working. They have put in a refill request with the pharmacy. Also pt is requesting a referral to [facility] in [facility].

## 2020-03-02 ENCOUNTER — TREATMENT (OUTPATIENT)
Dept: PHYSICAL THERAPY | Facility: CLINIC | Age: 85
End: 2020-03-02

## 2020-03-02 DIAGNOSIS — H81.10 BPPV (BENIGN PAROXYSMAL POSITIONAL VERTIGO), UNSPECIFIED LATERALITY: ICD-10-CM

## 2020-03-02 DIAGNOSIS — R42 VERTIGO: Primary | ICD-10-CM

## 2020-03-02 PROCEDURE — 97162 PT EVAL MOD COMPLEX 30 MIN: CPT | Performed by: PHYSICAL THERAPIST

## 2020-03-02 NOTE — PROGRESS NOTES
Physical Therapy Initial Evaluation and Plan of Care    Patient: Louis Villalba   : 10/20/1932  Diagnosis/ICD-10 Code:  Vertigo [R42]  Referring practitioner: Adam Feliz MD  PMx Reviewed : 3/2/2020    Subjective Evaluation    History of Present Illness  Mechanism of injury: Pt presents to PT with a Hx of dizziness since 2019.  The symptoms have gotten worse over time.  Starting taking Meclozine twice a day starting 2 wks ago and it helps control the symptoms.   The meclozine has made the symptoms go away.    Pt denies any Hx of vertigo prior to this episode.      Legally blind 20/400 because of macular degeneration      Occupation:  Retired -   Activities:  Social with friends, Listen to Music, [employer] 2x week, [employer] 1x wk  PLOF: Independent  Medical Hx Reviewed.      Quality of life: excellent    Social Support  [facility]  Lives with: alone             Objective       Assessment & Plan     Assessment  Impairments: activity intolerance and impaired balance  Assessment details: Pt presents to PT with symptoms consistent with vertigo, possibly BPPV.   His symptoms are not 100% aligning with BPPV.  Possibly something like  Meniere's Disease because of the randomness of the vertigo symptoms without motion and transition motions.   It was unclear today from the testing if the pt has BPPV likely from him consistently taking the Meclizine 2x day for the last several weeks.        Pt would benefit from skilled PT intervention to address the deficits noted.       Barriers to therapy: macular degeneration;  Prolong use of Meclizine   Prognosis: fair  Functional Limitations: moving in bed and reaching overhead  Goals  Plan Goals: SHORT TERM GOALS: Time for Goal: 2 weeks    1.  Pt reports that understand the information about BPPV and the treatment.    LONG TERM GOALS: Time for Goal: 1 months  1.  Pt to report absence of vertigo symptoms with all ADL's, IADL's,  household, recreational and community activities, including all motions and positions.       Plan  Therapy options: will be seen for skilled physical therapy services  Other planned modality interventions: Canalith repositioning   Planned therapy interventions: neuromuscular re-education  Other planned therapy interventions: Vestibular Strengthening  Frequency: 1-2x.  Duration in visits: 6  Treatment plan discussed with: patient  Plan details: The pt is to not take any meclozine for 2 dosages prior to his next visit.  Will test for BPPV.  If no clear symptoms are shown upon further testing, he will be referred to [facility] PT, who have Infrared Goggles & a Neurocom for more accurate testing.      If the pt tests positive for BPPV then we will proceed with the appropriate vestibular canalith repositioning.          Manual Therapy:          mins  08749;  Therapeutic Exercise:          mins  32736;     Neuromuscular Levar:         mins  86970;    Therapeutic Activity:           mins  79627;     Gait Training:            mins  11782;     Ultrasound:           mins  66054;    Electrical Stimulation:          mins  51101 (MC );  Dry Needling           mins self-pay  Traction           mins 24797  Canalith Repositioning         mins 84245      Timed Treatment:   -   mins   Total Treatment:     45   mins    PT SIGNATURE: Juan Milton PT   [place] Lic #[id number]    DATE TREATMENT INITIATED: 3/2/2020    Medicare Initial Certification   Certification Period: 5/31/2020  I certify that the therapy services are furnished while this patient is under my care.  The services outlined above are required by this patient, and will be reviewed every 90 days.     PHYSICIAN: Adam Feliz MD      DATE:     Please sign and return via fax to [phone].. Thank you, [facility].

## 2020-03-06 ENCOUNTER — TREATMENT (OUTPATIENT)
Dept: PHYSICAL THERAPY | Facility: CLINIC | Age: 85
End: 2020-03-06

## 2020-03-06 DIAGNOSIS — H81.10 BENIGN PAROXYSMAL POSITIONAL VERTIGO, UNSPECIFIED LATERALITY: ICD-10-CM

## 2020-03-06 DIAGNOSIS — H81.10 BPPV (BENIGN PAROXYSMAL POSITIONAL VERTIGO), UNSPECIFIED LATERALITY: ICD-10-CM

## 2020-03-06 DIAGNOSIS — R42 VERTIGO: Primary | ICD-10-CM

## 2020-03-06 PROCEDURE — 97112 NEUROMUSCULAR REEDUCATION: CPT | Performed by: PHYSICAL THERAPIST

## 2020-03-06 RX ORDER — MECLIZINE HYDROCHLORIDE 25 MG/1
25 TABLET ORAL 3 TIMES DAILY PRN
Qty: 60 TABLET | Refills: 0 | Status: SHIPPED | OUTPATIENT
Start: 2020-03-06 | End: 2021-10-11 | Stop reason: SDUPTHER

## 2020-03-06 NOTE — PROGRESS NOTES
The following note was sent to Dr. Feliz on 3/6/2020.    Dr. Feliz,    I a physical therapist in the back of the [facility], and recently evaluated Louis Villalba.  After testing his symptoms seem to suggest a possible Meniere's Disease vertigo.  I have referred him to [facility] Esquivel PT, at [facility] on 3/17/2020, who has more extensive testing equipment.    I am reaching out to you to discuss Mr. Villalba' Meclizine prescription.  He is about to run out of the medication (7 pills) and currently that is the only thing that helps his vertigo symptoms.  Would you be willing to refill that prescription?  This will help him cope with his symptoms until better treatment can be found.      Thank you,    Juan Milton PT        Juan Milton PT  [place] License #: [id number]    Physical Therapist

## 2020-03-06 NOTE — PROGRESS NOTES
Physical Therapy Daily Progress Note  Visit: 2    Louis Villalba reports: No dizziness since my 1st visit except one time.  I had vertigo symptoms the day after my 1st PT visit.  I was watching TV when the symptoms occurred.      Subjective     Objective   See Exercise, Manual, and Modality Logs for complete treatment.       Assessment & Plan     Assessment  Assessment details: The pt tests negative for BPPV with the DHP & Roll testing.  Upon questioning, the pt's one vertigo episode sounds like a Meniere's type vertigo (at 2 o'clock the symptoms occurred while sitting still watching TV after eating a hotdog lunch).  The pt will benefit from further more specific vertigo testing with [facility].  If nothing is found then referral to the appropriate ENT or neurologist for evaluation.      Plan  Plan details: Refer to [facility] PT.          Manual Therapy:          mins  62859;  Therapeutic Exercise:          mins  15487;     Neuromuscular Levar:     20    mins  01299;    Therapeutic Activity:           mins  77027;     Gait Training:            mins  89296;     Ultrasound:           mins  53759;    Electrical Stimulation:          mins  18972 ( );  Dry Needling           mins self-pay  Traction           mins 15144  Canalith Repositioning         mins 12833      Timed Treatment:   20   mins   Total Treatment:     20   mins    Juan Milton PT  [place] License #: [id number]    Physical Therapist

## 2020-05-13 DIAGNOSIS — K21.9 GASTROESOPHAGEAL REFLUX DISEASE WITHOUT ESOPHAGITIS: ICD-10-CM

## 2020-05-13 RX ORDER — PANTOPRAZOLE SODIUM 40 MG/1
TABLET, DELAYED RELEASE ORAL
Qty: 90 TABLET | Refills: 1 | Status: SHIPPED | OUTPATIENT
Start: 2020-05-13 | End: 2020-12-07

## 2020-09-08 DIAGNOSIS — E78.2 MIXED HYPERLIPIDEMIA: ICD-10-CM

## 2020-09-08 DIAGNOSIS — N40.0 BENIGN PROSTATIC HYPERPLASIA WITHOUT LOWER URINARY TRACT SYMPTOMS: ICD-10-CM

## 2020-09-08 RX ORDER — TAMSULOSIN HYDROCHLORIDE 0.4 MG/1
CAPSULE ORAL
Qty: 90 CAPSULE | Refills: 1 | Status: SHIPPED | OUTPATIENT
Start: 2020-09-08 | End: 2021-03-24

## 2020-09-08 RX ORDER — ATORVASTATIN CALCIUM 10 MG/1
TABLET, FILM COATED ORAL
Qty: 90 TABLET | Refills: 1 | Status: SHIPPED | OUTPATIENT
Start: 2020-09-08 | End: 2021-04-02

## 2020-11-03 ENCOUNTER — OFFICE VISIT (OUTPATIENT)
Dept: FAMILY MEDICINE CLINIC | Facility: CLINIC | Age: 85
End: 2020-11-03

## 2020-11-03 VITALS
SYSTOLIC BLOOD PRESSURE: 120 MMHG | HEIGHT: 65 IN | WEIGHT: 113 LBS | TEMPERATURE: 98 F | DIASTOLIC BLOOD PRESSURE: 64 MMHG | HEART RATE: 90 BPM | BODY MASS INDEX: 18.83 KG/M2 | OXYGEN SATURATION: 100 %

## 2020-11-03 DIAGNOSIS — H66.92 LEFT OTITIS MEDIA, UNSPECIFIED OTITIS MEDIA TYPE: ICD-10-CM

## 2020-11-03 DIAGNOSIS — R06.02 SHORTNESS OF BREATH: ICD-10-CM

## 2020-11-03 DIAGNOSIS — K21.9 GASTROESOPHAGEAL REFLUX DISEASE WITHOUT ESOPHAGITIS: ICD-10-CM

## 2020-11-03 DIAGNOSIS — R42 DIZZINESS: Primary | ICD-10-CM

## 2020-11-03 DIAGNOSIS — J44.9 CHRONIC OBSTRUCTIVE PULMONARY DISEASE, UNSPECIFIED COPD TYPE (HCC): ICD-10-CM

## 2020-11-03 LAB
BILIRUB BLD-MCNC: NEGATIVE MG/DL
CLARITY, POC: CLEAR
COLOR UR: YELLOW
GLUCOSE UR STRIP-MCNC: NEGATIVE MG/DL
KETONES UR QL: ABNORMAL
LEUKOCYTE EST, POC: NEGATIVE
NITRITE UR-MCNC: NEGATIVE MG/ML
PH UR: 6 [PH] (ref 5–8)
PROT UR STRIP-MCNC: ABNORMAL MG/DL
RBC # UR STRIP: NEGATIVE /UL
SP GR UR: 1.02 (ref 1–1.03)
UROBILINOGEN UR QL: NORMAL

## 2020-11-03 PROCEDURE — 81003 URINALYSIS AUTO W/O SCOPE: CPT | Performed by: NURSE PRACTITIONER

## 2020-11-03 PROCEDURE — 99214 OFFICE O/P EST MOD 30 MIN: CPT | Performed by: NURSE PRACTITIONER

## 2020-11-03 RX ORDER — DESONIDE 0.5 MG/G
CREAM TOPICAL
COMMUNITY
Start: 2020-09-01

## 2020-11-03 RX ORDER — AMOXICILLIN AND CLAVULANATE POTASSIUM 875; 125 MG/1; MG/1
1 TABLET, FILM COATED ORAL 2 TIMES DAILY
Qty: 14 TABLET | Refills: 0 | Status: SHIPPED | OUTPATIENT
Start: 2020-11-03 | End: 2020-11-10

## 2020-11-03 NOTE — ASSESSMENT & PLAN NOTE
Increase intake of clear liquids.  Consider daily antihistamine, such as Claritin or Allegra.  Change positions slowly.

## 2020-11-03 NOTE — PATIENT INSTRUCTIONS
Increase intake of clear liquids.  Consider daily antihistamine, such as Claritin or Allegra.  Change positions slowly.  Follow up pending lab results.  Follow up if symptoms persist or worsen.

## 2020-11-03 NOTE — PROGRESS NOTES
Subjective   Louis Villalba is a 88 y.o. male.     Chief Complaint   Patient presents with   • Dizziness     had a fall due to episode on 9/12/20, had another episode yesterday   • Shortness of Breath     walking       History of Present Illness   Patient of Dr. Feliz and is new to me; patient presents with c/o dizziness; dizziness first started in March, saw Dr. Feliz, sent to physical therapy for vertigo and did not help; takes Meclizine as needed and does help; had not had another episode until 9/12/20, then had another episode last night; symptoms improve with laying down; symptoms worse when up moving around, dizziness seems to come on randomly, not necessarily related to position changes; symptoms will last for several hours, then resolve with Meclizine; some room spinning, not related to turning over; if does not hold onto things, will fall; had fall getting out of bed with episode in September; did not hit head; no recent falls; no headaches; no BP medications; does not drink much water; no new medications for long time.    Also, c/o SOA with walking; has history of pneumothorax in past; has COPD; uses nebulizer and inhaler; has had little bit of cough; some productive cough when uses nebulizer; no change in sputum; had some raspy voice yesterday, no sore throat; no fever; occasional left ear pain; has macular degeneration and vision is 20/2400; no dark stools; no blood in BM; no urinary frequency; no dysuria; has had some nausea; goes to [employer] twice per week.    Son was present during the history-taking and subsequent discussion with this patient.  Patient agrees to the presence of the individual during this visit.    The following portions of the patient's history were reviewed and updated as appropriate: allergies, current medications, past family history, past medical history, past social history, past surgical history and problem list.    Current Outpatient Medications on File Prior to  Visit   Medication Sig   • acetaminophen (TYLENOL) 325 MG tablet Take 2 tablets by mouth Every 6 (Six) Hours As Needed for Mild Pain .   • albuterol (PROVENTIL HFA;VENTOLIN HFA) 108 (90 Base) MCG/ACT inhaler Inhale 2 puffs Every 4 (Four) Hours As Needed for Wheezing.   • albuterol (PROVENTIL) (2.5 MG/3ML) 0.083% nebulizer solution Take 2.5 mg by nebulization 4 (Four) Times a Day As Needed for Wheezing.   • Aspirin Buf,CaCarb-MgCarb-MgO, 81 MG tablet Take 81 mg by mouth Daily.   • atorvastatin (LIPITOR) 10 MG tablet TAKE 1 TABLET BY MOUTH ONCE DAILY   • brimonidine-timolol (COMBIGAN) 0.2-0.5 % ophthalmic solution Administer 1 drop to both eyes Every 12 (Twelve) Hours.   • desonide (DESOWEN) 0.05 % cream APPLY TO LEFT LOWER LID BID   • docusate sodium (COLACE) 100 MG capsule Take 100 mg by mouth 2 (Two) Times a Day.   • erythromycin (ROMYCIN) 5 MG/GM ophthalmic ointment Every Night.   • meclizine (ANTIVERT) 25 MG tablet Take 1 tablet by mouth 3 (Three) Times a Day As Needed for Dizziness.   • Multiple Vitamins-Minerals (VITEYES AREDS FORMULA PO) Take 2 tablets by mouth Daily.   • ofloxacin (OCUFLOX) 0.3 % ophthalmic solution    • pantoprazole (PROTONIX) 40 MG EC tablet TAKE 1 TABLET BY MOUTH AT BEDTIME   • tamsulosin (FLOMAX) 0.4 MG capsule 24 hr capsule TAKE 1 CAPSULE BY MOUTH ONCE DAILY   • Tiotropium Bromide Monohydrate (SPIRIVA RESPIMAT) 2.5 MCG/ACT aerosol solution Inhale 5 mg Daily.     No current facility-administered medications on file prior to visit.        Past Medical History:   Diagnosis Date   • Anemia    • Arthritis    • Cancer (CMS/HCC)     skin: BASAL CELL on face, excised- remote   • Colon polyp    • History of transfusion    • Hyperlipidemia    • Macular degeneration        Past Surgical History:   Procedure Laterality Date   • APPENDECTOMY  1958   • COLONOSCOPY  2013   • ENDOSCOPY N/A 10/5/2016    Procedure: ESOPHAGOGASTRODUODENOSCOPY with biopsy;  Surgeon: Edward Gan MD;  Location: [facility]  ENDOSCOPY;  Service:    • EYE SURGERY      cataract b/l surgery   • SKIN BIOPSY         Family History   Problem Relation Age of Onset   • No Known Problems Mother    • No Known Problems Father        Social History     Socioeconomic History   • Marital status:      Spouse name: Not on file   • Number of children: Not on file   • Years of education: Not on file   • Highest education level: Not on file   Tobacco Use   • Smoking status: Former Smoker     Packs/day: 1.50     Years: 10.00     Pack years: 15.00     Types: Cigarettes     Start date: 12/24/2000     Quit date: 2010     Years since quitting: 10.8   • Smokeless tobacco: Never Used   Substance and Sexual Activity   • Alcohol use: Yes     Alcohol/week: 21.0 standard drinks     Types: 21 Cans of beer per week     Comment: drinks 2 beers daily   • Drug use: No   • Sexual activity: Defer       Review of Systems   Constitutional: Positive for appetite change (has had decreased appetite for several months) and fatigue. Negative for chills, fever, unexpected weight gain and unexpected weight loss.   HENT: Negative for postnasal drip, sinus pressure and trouble swallowing. Ear pain: at times.    Eyes: Eye discharge: has dry eyes.   Respiratory: Positive for cough and shortness of breath. Negative for chest tightness.    Cardiovascular: Negative for chest pain, palpitations and leg swelling.   Gastrointestinal: Positive for nausea. Negative for blood in stool, constipation, diarrhea, GERD (no problems as long as takes Pantoprazole daily) and indigestion.   Endocrine: Negative for polydipsia.   Genitourinary: Negative for dysuria, frequency and hematuria.   Musculoskeletal: Negative for back pain. Arthralgias: not too bad.   Skin: Negative for rash.   Neurological: Negative for syncope and headache.   Hematological: Bruises/bleeds easily: bruises easily.       Objective   Vitals:    11/03/20 1126   BP: 120/64   BP Location: Left arm   Patient Position: Sitting  "  Cuff Size: Adult   Pulse: 90   Temp: 98 °F (36.7 °C)   TempSrc: Infrared   SpO2: 100%   Weight: 51.3 kg (113 lb)   Height: 165.1 cm (65\")   PainSc:   2   PainLoc: Abdomen     Body mass index is 18.8 kg/m².    Physical Exam  Vitals signs and nursing note reviewed.   Constitutional:       General: He is not in acute distress.     Appearance: He is not ill-appearing or diaphoretic.   HENT:      Head: Normocephalic.      Jaw: No tenderness or pain on movement.      Right Ear: External ear normal. Right ear decreased hearing: bilateral hearing aids. Right ear middle ear effusion: TM dull. Tympanic membrane is not erythematous.      Left Ear: External ear normal. Left ear decreased hearing: bilateral hearing aids. Left ear drainage: mild drainage in canal. Left ear middle ear effusion: yellowish fluid behind TM. Tympanic membrane is scarred. Tympanic membrane is not erythematous.      Nose: Nose normal.      Right Sinus: No maxillary sinus tenderness or frontal sinus tenderness.      Left Sinus: No maxillary sinus tenderness or frontal sinus tenderness.      Mouth/Throat:      Pharynx: No posterior oropharyngeal erythema. Oropharyngeal exudate: drainage noted in posterior pharynx.   Eyes:      Extraocular Movements: Extraocular movements intact.      Conjunctiva/sclera: Conjunctivae normal.      Comments: Significant vision loss bilaterally   Neck:      Musculoskeletal: Normal range of motion and neck supple.      Vascular: No carotid bruit.   Cardiovascular:      Rate and Rhythm: Normal rate and regular rhythm.      Pulses: Normal pulses.      Heart sounds: Normal heart sounds. No murmur.   Pulmonary:      Effort: Pulmonary effort is normal. No respiratory distress.      Breath sounds: Normal breath sounds. No wheezing or rales. Decreased breath sounds: slight decrease in RLL.   Abdominal:      General: Bowel sounds are normal.      Palpations: Abdomen is soft. There is no hepatomegaly or splenomegaly.      Tenderness: " There is no abdominal tenderness. There is no right CVA tenderness or left CVA tenderness.   Musculoskeletal:      Right lower leg: No edema.      Left lower leg: No edema.   Lymphadenopathy:      Cervical: No cervical adenopathy.   Skin:     General: Skin is warm and dry.   Neurological:      Mental Status: He is alert and oriented to person, place, and time.      Cranial Nerves: No cranial nerve deficit.      Gait: Abnormal gait: guarded gait.      Comments: Negative Peewee-Hallpike test   Psychiatric:         Mood and Affect: Mood normal.         Behavior: Behavior normal.         Thought Content: Thought content normal.         Cognition and Memory: Cognition normal.         Judgment: Judgment normal.         Lab Results   Component Value Date    WBC 6.77 06/21/2019    RBC 4.19 06/21/2019    HGB 13.4 06/21/2019    HCT 41.4 06/21/2019    MCV 98.8 (H) 06/21/2019    MCH 32.0 06/21/2019    MCHC 32.4 06/21/2019    RDW 13.5 06/21/2019    RDWSD 48.9 06/21/2019    MPV 10.7 06/21/2019     06/21/2019    NEUTRORELPCT 56.3 06/21/2019    LYMPHORELPCT 24.4 06/21/2019    MONORELPCT 13.0 (H) 06/21/2019    EOSRELPCT 5.8 06/21/2019    BASORELPCT 0.4 06/21/2019    AUTOIGPER 0.1 06/21/2019    NEUTROABS 3.81 06/21/2019    LYMPHSABS 1.65 06/21/2019    MONOSABS 0.88 06/21/2019    EOSABS 0.39 06/21/2019    BASOSABS 0.03 06/21/2019    AUTOIGNUM 0.01 06/21/2019    NRBC 0.0 06/21/2019     Lab Results   Component Value Date    GLUCOSE 187 (H) 06/21/2019    BUN 17 12/30/2019    CREATININE 1.24 12/30/2019    EGFRIFNONA 52 (L) 12/30/2019    EGFRIFAFRI 60 12/30/2019    BCR 14 12/30/2019    K 5.2 12/30/2019    CO2 21 12/30/2019    CALCIUM 9.2 12/30/2019    PROTENTOTREF 7.5 12/30/2019    ALBUMIN 4.4 12/30/2019    LABIL2 1.4 12/30/2019    AST 24 12/30/2019    ALT 12 12/30/2019      Lab Results   Component Value Date    CHLPL 149 12/30/2019    TRIG 86 12/30/2019    HDL 67 12/30/2019    VLDL 17 12/30/2019    LDL 65 12/30/2019     Office note  from pulmonary on 12/20/19 reviewed.    Assessment/Plan .  Problem List Items Addressed This Visit     COPD (chronic obstructive pulmonary disease) (CMS/[facility])    Current Assessment & Plan     COPD is worsening.  Continue current medications.         Relevant Medications    amoxicillin-clavulanate (Augmentin) 875-125 MG per tablet    GERD (gastroesophageal reflux disease)    Current Assessment & Plan     Stable on Pantoprazole daily.         Dizziness - Primary    Current Assessment & Plan     Increase intake of clear liquids.  Consider daily antihistamine, such as Claritin or Allegra.  Change positions slowly.         Relevant Orders    POC Urinalysis Dipstick, Automated (Completed)    CBC & Differential    Comprehensive Metabolic Panel    Shortness of breath    Relevant Orders    CBC & Differential    Left otitis media    Relevant Medications    amoxicillin-clavulanate (Augmentin) 875-125 MG per tablet          Return in 1 month (on 12/3/2020) for Recheck.or sooner if symptoms persist or worsen.  Will get MRI brain if labs normal and symptoms persist.

## 2020-11-04 LAB
BASOPHILS # BLD AUTO: 0 X10E3/UL (ref 0–0.2)
BASOPHILS NFR BLD AUTO: 0 %
BUN SERPL-MCNC: 19 MG/DL (ref 8–27)
BUN/CREAT SERPL: 15 (ref 10–24)
CALCIUM SERPL-MCNC: 9.2 MG/DL (ref 8.6–10.2)
CHLORIDE SERPL-SCNC: 102 MMOL/L (ref 96–106)
CO2 SERPL-SCNC: 22 MMOL/L (ref 20–29)
CREAT SERPL-MCNC: 1.28 MG/DL (ref 0.76–1.27)
EOSINOPHIL # BLD AUTO: 0 X10E3/UL (ref 0–0.4)
EOSINOPHIL NFR BLD AUTO: 1 %
ERYTHROCYTE [DISTWIDTH] IN BLOOD BY AUTOMATED COUNT: 13 % (ref 11.6–15.4)
GLUCOSE SERPL-MCNC: 99 MG/DL (ref 65–99)
HCT VFR BLD AUTO: 38.1 % (ref 37.5–51)
HGB BLD-MCNC: 12.7 G/DL (ref 13–17.7)
IMM GRANULOCYTES # BLD AUTO: 0 X10E3/UL (ref 0–0.1)
IMM GRANULOCYTES NFR BLD AUTO: 0 %
LYMPHOCYTES # BLD AUTO: 0.9 X10E3/UL (ref 0.7–3.1)
LYMPHOCYTES NFR BLD AUTO: 14 %
MCH RBC QN AUTO: 30.1 PG (ref 26.6–33)
MCHC RBC AUTO-ENTMCNC: 33.3 G/DL (ref 31.5–35.7)
MCV RBC AUTO: 90 FL (ref 79–97)
MONOCYTES # BLD AUTO: 1 X10E3/UL (ref 0.1–0.9)
MONOCYTES NFR BLD AUTO: 15 %
NEUTROPHILS # BLD AUTO: 4.6 X10E3/UL (ref 1.4–7)
NEUTROPHILS NFR BLD AUTO: 70 %
PLATELET # BLD AUTO: 188 X10E3/UL (ref 150–450)
POTASSIUM SERPL-SCNC: 5.6 MMOL/L (ref 3.5–5.2)
RBC # BLD AUTO: 4.22 X10E6/UL (ref 4.14–5.8)
SODIUM SERPL-SCNC: 136 MMOL/L (ref 134–144)
WBC # BLD AUTO: 6.5 X10E3/UL (ref 3.4–10.8)

## 2020-11-09 ENCOUNTER — TELEPHONE (OUTPATIENT)
Dept: FAMILY MEDICINE CLINIC | Facility: CLINIC | Age: 85
End: 2020-11-09

## 2020-11-09 NOTE — TELEPHONE ENCOUNTER
PATIENTS DAUGHTER CALLED IN STATED THAT HE HAS TESTED POSITIVE FOR COVID, ON NOV 6 OR 7.   HE HAS COPD, HE IS CURRENTLY ON AN ANTIBIOTIC, BUT JUST DOESN'T FEEL WELL. BUT IS HAVING SOME DIFFICULTY BREATHING.  SHE WOULD LIKE A CALL BACK JUST BECAUSE SHE JUST DOESN'T KNOW WHAT TO DO     PLEASE ADVISE   [phone]

## 2020-11-09 NOTE — TELEPHONE ENCOUNTER
Spoke to patient daughter Debbie ( on HIPAA) I informed her if he is having trouble breathing he should be taken to the ER. She agrees and will have EMS pick patient up and go to the hospital to be evaluated

## 2020-11-09 NOTE — TELEPHONE ENCOUNTER
CLINTON CALLING TO GET AN UPDATE ABOUT WHY SHE HASN'T HEARD ANYTHING BACK REGARDING THIS. SHE ISN'T SURE IF SHE NEEDS TO TAKE HIM TO THE HOSPITAL OR STAY WHERE HE IS. SHE IS DESPERATE TO GET AN ANSWER.    PLEASE ADVISE  [phone]

## 2020-11-12 ENCOUNTER — OFFICE VISIT (OUTPATIENT)
Dept: FAMILY MEDICINE CLINIC | Facility: CLINIC | Age: 85
End: 2020-11-12

## 2020-11-12 DIAGNOSIS — R79.89 ELEVATED LIVER FUNCTION TESTS: ICD-10-CM

## 2020-11-12 DIAGNOSIS — J06.9 UPPER RESPIRATORY TRACT INFECTION DUE TO COVID-19 VIRUS: Primary | ICD-10-CM

## 2020-11-12 DIAGNOSIS — U07.1 UPPER RESPIRATORY TRACT INFECTION DUE TO COVID-19 VIRUS: Primary | ICD-10-CM

## 2020-11-12 PROCEDURE — 99442 PR PHYS/QHP TELEPHONE EVALUATION 11-20 MIN: CPT | Performed by: INTERNAL MEDICINE

## 2020-11-12 RX ORDER — BENZONATATE 100 MG/1
100 CAPSULE ORAL
COMMUNITY
Start: 2020-11-09 | End: 2022-02-24

## 2020-11-12 RX ORDER — PREDNISONE 20 MG/1
40 TABLET ORAL DAILY
COMMUNITY
Start: 2020-11-09 | End: 2020-11-14

## 2020-11-12 NOTE — PROGRESS NOTES
Subjective   Louis Villalba is a 88 y.o. male.     Chief Complaint   Patient presents with   • COVID-19 positive       History of Present Illness   You have chosen to receive care through a telephone visit. Do you consent to use a telephone visit for your medical care today? Yes    Patient was seein in UC on 11/6/2020 then in ER 11/9/2020 with cough, fever, wheezing, short of breath and fatigue that.  Was given prednisone, tessalon perrles, and albuterol.  Now feels better with more energy and cough/fever resolved.  Chart review showed that patient had a chest x-ray CTA of the chest that showed emphysematous changes otherwise chronic changes and no evidence of an acute disease.  Covid test from 11/6/2020 was positive GFR was 58, calcium level low at 8.2, and AST of 445, ALT of 357 and alkaline phosphatase of 436.  The low calcium and elevated liver function tests are new but may have been affected by some trace hemolysis at the lab.  Patient denies any abdominal pain, nausea, vomiting, new weakness, diarrhea, blood in anything that he can see.    The following portions of the patient's history were reviewed and updated as appropriate: allergies, current medications, past family history, past medical history, past social history, past surgical history and problem list.    Depression Screen:  PHQ-2/PHQ-9 Depression Screening 1/27/2020   Little interest or pleasure in doing things 0   Feeling down, depressed, or hopeless 0   Trouble falling or staying asleep, or sleeping too much 0   Feeling tired or having little energy 1   Poor appetite or overeating 0   Feeling bad about yourself - or that you are a failure or have let yourself or your family down 0   Trouble concentrating on things, such as reading the newspaper or watching television 0   Moving or speaking so slowly that other people could have noticed. Or the opposite - being so fidgety or restless that you have been moving around a lot more than usual 0    Thoughts that you would be better off dead, or of hurting yourself in some way 0   Total Score 1       Past Medical History:   Diagnosis Date   • Anemia    • Arthritis    • Cancer (CMS/HCC)     skin: BASAL CELL on face, excised- remote   • Colon polyp    • History of transfusion    • Hyperlipidemia    • Macular degeneration        Past Surgical History:   Procedure Laterality Date   • APPENDECTOMY  1958   • COLONOSCOPY  2013   • ENDOSCOPY N/A 10/5/2016    Procedure: ESOPHAGOGASTRODUODENOSCOPY with biopsy;  Surgeon: Edward Gan MD;  Location: [facility] ENDOSCOPY;  Service:    • EYE SURGERY      cataract b/l surgery   • SKIN BIOPSY         Family History   Problem Relation Age of Onset   • No Known Problems Mother    • No Known Problems Father        Social History     Socioeconomic History   • Marital status:      Spouse name: Not on file   • Number of children: Not on file   • Years of education: Not on file   • Highest education level: Not on file   Tobacco Use   • Smoking status: Former Smoker     Packs/day: 1.50     Years: 10.00     Pack years: 15.00     Types: Cigarettes     Start date: 12/24/2000     Quit date: 2010     Years since quitting: 10.8   • Smokeless tobacco: Never Used   Substance and Sexual Activity   • Alcohol use: Yes     Alcohol/week: 21.0 standard drinks     Types: 21 Cans of beer per week     Comment: drinks 2 beers daily   • Drug use: No   • Sexual activity: Defer       Current Outpatient Medications   Medication Sig Dispense Refill   • benzonatate (TESSALON) 100 MG capsule Take 100 mg by mouth.     • predniSONE (DELTASONE) 20 MG tablet Take 40 mg by mouth Daily.     • acetaminophen (TYLENOL) 325 MG tablet Take 2 tablets by mouth Every 6 (Six) Hours As Needed for Mild Pain .     • albuterol (PROVENTIL HFA;VENTOLIN HFA) 108 (90 Base) MCG/ACT inhaler Inhale 2 puffs Every 4 (Four) Hours As Needed for Wheezing. 2 inhaler 0   • albuterol (PROVENTIL) (2.5 MG/3ML) 0.083% nebulizer  solution Take 2.5 mg by nebulization 4 (Four) Times a Day As Needed for Wheezing. 125 vial 2   • Aspirin Buf,CaCarb-MgCarb-MgO, 81 MG tablet Take 81 mg by mouth Daily.     • atorvastatin (LIPITOR) 10 MG tablet TAKE 1 TABLET BY MOUTH ONCE DAILY 90 tablet 1   • brimonidine-timolol (COMBIGAN) 0.2-0.5 % ophthalmic solution Administer 1 drop to both eyes Every 12 (Twelve) Hours.     • desonide (DESOWEN) 0.05 % cream APPLY TO LEFT LOWER LID BID     • docusate sodium (COLACE) 100 MG capsule Take 100 mg by mouth 2 (Two) Times a Day.     • erythromycin (ROMYCIN) 5 MG/GM ophthalmic ointment Every Night.     • Incruse Ellipta 62.5 MCG/INH aerosol powder  INL 1 PUFF PO D     • meclizine (ANTIVERT) 25 MG tablet Take 1 tablet by mouth 3 (Three) Times a Day As Needed for Dizziness. 60 tablet 0   • Multiple Vitamins-Minerals (VITEYES AREDS FORMULA PO) Take 2 tablets by mouth Daily.     • ofloxacin (OCUFLOX) 0.3 % ophthalmic solution      • pantoprazole (PROTONIX) 40 MG EC tablet TAKE 1 TABLET BY MOUTH AT BEDTIME 90 tablet 1   • tamsulosin (FLOMAX) 0.4 MG capsule 24 hr capsule TAKE 1 CAPSULE BY MOUTH ONCE DAILY 90 capsule 1   • Tiotropium Bromide Monohydrate (SPIRIVA RESPIMAT) 2.5 MCG/ACT aerosol solution Inhale 5 mg Daily. 1 inhaler 2     No current facility-administered medications for this visit.        Review of Systems   Constitutional: Negative for activity change, appetite change, fatigue, fever, unexpected weight gain and unexpected weight loss.   HENT: Negative for nosebleeds, rhinorrhea, trouble swallowing and voice change.    Eyes: Negative for visual disturbance.   Respiratory: Positive for cough. Negative for chest tightness, shortness of breath and wheezing.    Cardiovascular: Negative for chest pain, palpitations and leg swelling.   Gastrointestinal: Negative for abdominal pain, blood in stool, constipation, diarrhea, nausea, vomiting, GERD and indigestion.   Genitourinary: Negative for dysuria, frequency and  hematuria.   Musculoskeletal: Negative for arthralgias, back pain and myalgias.   Skin: Negative for rash and bruise.   Neurological: Negative for dizziness, tremors, weakness, light-headedness, numbness, headache and memory problem.   Hematological: Negative for adenopathy. Does not bruise/bleed easily.   Psychiatric/Behavioral: Negative for sleep disturbance and depressed mood. The patient is not nervous/anxious.        Objective   There were no vitals taken for this visit.    Physical Exam   Constitutional: No distress.   Pulmonary/Chest: Effort normal.  No respiratory distress.  Neurological: He is alert.   Psychiatric: He has a normal mood and affect. His behavior is normal. Thought content is normal. He does not express abnormal judgement.       Recent Results (from the past 2016 hour(s))   CBC & Differential    Collection Time: 11/03/20 12:22 PM    Specimen: Blood   Result Value Ref Range    WBC 6.5 3.4 - 10.8 x10E3/uL    RBC 4.22 4.14 - 5.80 x10E6/uL    Hemoglobin 12.7 (L) 13.0 - 17.7 g/dL    Hematocrit 38.1 37.5 - 51.0 %    MCV 90 79 - 97 fL    MCH 30.1 26.6 - 33.0 pg    MCHC 33.3 31.5 - 35.7 g/dL    RDW 13.0 11.6 - 15.4 %    Platelets 188 150 - 450 x10E3/uL    Neutrophil Rel % 70 Not Estab. %    Lymphocyte Rel % 14 Not Estab. %    Monocyte Rel % 15 Not Estab. %    Eosinophil Rel % 1 Not Estab. %    Basophil Rel % 0 Not Estab. %    Neutrophils Absolute 4.6 1.4 - 7.0 x10E3/uL    Lymphocytes Absolute 0.9 0.7 - 3.1 x10E3/uL    Monocytes Absolute 1.0 (H) 0.1 - 0.9 x10E3/uL    Eosinophils Absolute 0.0 0.0 - 0.4 x10E3/uL    Basophils Absolute 0.0 0.0 - 0.2 x10E3/uL    Immature Granulocyte Rel % 0 Not Estab. %    Immature Grans Absolute 0.0 0.0 - 0.1 x10E3/uL   Basic Metabolic Panel    Collection Time: 11/03/20 12:22 PM   Result Value Ref Range    Glucose 99 65 - 99 mg/dL    BUN 19 8 - 27 mg/dL    Creatinine 1.28 (H) 0.76 - 1.27 mg/dL    eGFR Non African Am 50 (L) >59 mL/min/1.73    eGFR African Am 57 (L) >59  mL/min/1.73    BUN/Creatinine Ratio 15 10 - 24    Sodium 136 134 - 144 mmol/L    Potassium 5.6 (H) 3.5 - 5.2 mmol/L    Chloride 102 96 - 106 mmol/L    Total CO2 22 20 - 29 mmol/L    Calcium 9.2 8.6 - 10.2 mg/dL   POC Urinalysis Dipstick, Automated    Collection Time: 11/03/20 12:23 PM    Specimen: Urine   Result Value Ref Range    Color Yellow Yellow, Straw, Dark Yellow, Fatmata    Clarity, UA Clear Clear    Specific Gravity  1.025 1.005 - 1.030    pH, Urine 6.0 5.0 - 8.0    Leukocytes Negative Negative    Nitrite, UA Negative Negative    Protein, POC 2+ (A) Negative mg/dL    Glucose, UA Negative Negative, 1000 mg/dL (3+) mg/dL    Ketones, UA Trace (A) Negative    Urobilinogen, UA Normal Normal    Bilirubin Negative Negative    Blood, UA Negative Negative   CBC AND DIFFERENTIAL    Collection Time: 11/09/20  3:49 PM    Specimen type and source: Whole Blood, Blood   Result Value Ref Range    WBC 5.42 4.5 - 11.0 10*3/uL    RBC 3.81 (L) 4.5 - 5.9 10*6/uL    Hemoglobin 11.5 (L) 13.5 - 17.5 g/dL    Hematocrit 34.9 (L) 41.0 - 53.0 %    MCV 91.6 80.0 - 100.0 fL    MCH 30.2 26.0 - 34.0 pg    MCHC 33.0 31.0 - 37.0 g/dL    RDW 13.4 12.0 - 16.8 %    Platelets 180 140 - 440 10*3/uL    MPV 11.1 8.4 - 12.4 fL    Differential Type Hospital CBC w/AutoDiff (arb'U)    Neutrophil Rel % 61.3 45 - 80 %    Lymphocyte Rel % 18.8 15 - 50 %    Monocyte Rel % 17.0 (H) 0 - 15 %    Eosinophil % 2.0 0 - 7 %    Basophil Rel % 0.2 0 - 2 %    Immature Grans % 0.7 0.0 - 1.0 %    nRBC 0 0 /100(WBC)    Neutrophils Absolute 3.32 2.0 - 8.8 10*3/uL    Lymphocytes Absolute 1.02 0.7 - 5.5 10*3/uL    Monocytes Absolute 0.92 0.0 - 1.7 10*3/uL    Eosinophils Absolute 0.11 0.0 - 0.8 10*3/uL    Basophils Absolute 0.01 0.0 - 0.2 10*3/uL    Immature Grans, Absolute 0.04 0.00 - 0.10 10*3/uL   TROPONIN (IN-HOUSE)    Collection Time: 11/09/20  3:49 PM    Specimen: Fresh Frozen Plasma    Specimen type and source: Plasma, Blood   Result Value Ref Range    Troponin I  <0.012 0.000 - 0.034 ng/mL   PROBNP (REFERENCE)    Collection Time: 11/09/20  3:49 PM    Specimen: Fresh Frozen Plasma    Specimen type and source: Plasma, Blood   Result Value Ref Range    .0 <450 pg/mL     Assessment/Plan   Diagnoses and all orders for this visit:    1. Upper respiratory tract infection due to COVID-19 virus (Primary)    2. Elevated liver function tests  -     Hepatic Function Panel; Future    Discussed with patient concerning the COVID-19 natural course and progression and symptoms to watch for.  He actually appears to be improving by history and talking to the patient.  We will continue to observe he has any worsening problems shortness of breath etc. then to the emergency room.  At this time he is to continue and finish his prednisone tablets for the next 2 to 3 days and continue the albuterol as needed.  He is also to stay in his isolation/quarantine as recommended.  Discussed his elevated liver function tests of AST ALT and alkaline phosphatase.  This may be secondary to the lab issue or due to the COVID-19 possibly causing a mild viral hepatitis.  I recommend that we repeat his liver function tests in the next couple weeks and follow him closely.  If he starts having diarrhea abdominal pain changing color such as yellowing of his eyes then he will need to be evaluated sooner.      Telephone visit completed.  Time of visit in discussion and care of patient and one-on-one care not including charting time of 11 minutes.

## 2020-11-26 LAB
ALBUMIN SERPL-MCNC: 4.5 G/DL (ref 3.6–4.6)
ALBUMIN/GLOB SERPL: 1.3 {RATIO} (ref 1.2–2.2)
ALP SERPL-CCNC: 139 IU/L (ref 39–117)
ALT SERPL-CCNC: 14 IU/L (ref 0–44)
AST SERPL-CCNC: 29 IU/L (ref 0–40)
BILIRUB SERPL-MCNC: 0.3 MG/DL (ref 0–1.2)
BUN SERPL-MCNC: 19 MG/DL (ref 8–27)
BUN/CREAT SERPL: 13 (ref 10–24)
CALCIUM SERPL-MCNC: 9.1 MG/DL (ref 8.6–10.2)
CHLORIDE SERPL-SCNC: 98 MMOL/L (ref 96–106)
CO2 SERPL-SCNC: 17 MMOL/L (ref 20–29)
CREAT SERPL-MCNC: 1.46 MG/DL (ref 0.76–1.27)
GLOBULIN SER CALC-MCNC: 3.5 G/DL (ref 1.5–4.5)
GLUCOSE SERPL-MCNC: 103 MG/DL (ref 65–99)
POTASSIUM SERPL-SCNC: 5.6 MMOL/L (ref 3.5–5.2)
PROT SERPL-MCNC: 8 G/DL (ref 6–8.5)
SODIUM SERPL-SCNC: 137 MMOL/L (ref 134–144)
VERBAL ADD-ON: NORMAL
WRITTEN AUTHORIZATION: NORMAL

## 2020-11-30 ENCOUNTER — RESULTS ENCOUNTER (OUTPATIENT)
Dept: FAMILY MEDICINE CLINIC | Facility: CLINIC | Age: 85
End: 2020-11-30

## 2020-11-30 DIAGNOSIS — R79.89 ELEVATED LIVER FUNCTION TESTS: ICD-10-CM

## 2020-12-03 DIAGNOSIS — K21.9 GASTROESOPHAGEAL REFLUX DISEASE WITHOUT ESOPHAGITIS: ICD-10-CM

## 2020-12-07 RX ORDER — PANTOPRAZOLE SODIUM 40 MG/1
TABLET, DELAYED RELEASE ORAL
Qty: 90 TABLET | Refills: 1 | Status: SHIPPED | OUTPATIENT
Start: 2020-12-07 | End: 2021-03-11

## 2020-12-09 ENCOUNTER — TELEPHONE (OUTPATIENT)
Dept: FAMILY MEDICINE CLINIC | Facility: CLINIC | Age: 85
End: 2020-12-09

## 2020-12-09 DIAGNOSIS — H81.10 BENIGN PAROXYSMAL POSITIONAL VERTIGO, UNSPECIFIED LATERALITY: Primary | ICD-10-CM

## 2020-12-09 NOTE — TELEPHONE ENCOUNTER
Patient would like a new referral to [facility]. He states he has been getting very dizzy.    Patient can be reached at [phone].

## 2020-12-15 ENCOUNTER — TREATMENT (OUTPATIENT)
Dept: PHYSICAL THERAPY | Facility: CLINIC | Age: 85
End: 2020-12-15

## 2020-12-15 DIAGNOSIS — R42 VERTIGO: ICD-10-CM

## 2020-12-15 DIAGNOSIS — H81.13 BPPV (BENIGN PAROXYSMAL POSITIONAL VERTIGO), BILATERAL: Primary | ICD-10-CM

## 2020-12-15 PROCEDURE — 97161 PT EVAL LOW COMPLEX 20 MIN: CPT | Performed by: PHYSICAL THERAPIST

## 2020-12-15 PROCEDURE — 95992 CANALITH REPOSITIONING PROC: CPT | Performed by: PHYSICAL THERAPIST

## 2020-12-23 ENCOUNTER — TELEPHONE (OUTPATIENT)
Dept: FAMILY MEDICINE CLINIC | Facility: CLINIC | Age: 85
End: 2020-12-23

## 2020-12-23 NOTE — TELEPHONE ENCOUNTER
Provider: JAYCOB MASCORRO   Caller: CLINTON MEI  Relationship to Patient: DAUGHTER  Phone Number: [phone](PATIENT'S)    Reason for Call: PATIENT'S DAUGHTER CALLED REQUESTING A REFERRAL TO [facility] PER DOCTOR SUBHA. PLEASE FOLLOW UP WITH PATIENT/DAUGHTER

## 2020-12-31 ENCOUNTER — TREATMENT (OUTPATIENT)
Dept: PHYSICAL THERAPY | Facility: CLINIC | Age: 85
End: 2020-12-31

## 2020-12-31 DIAGNOSIS — R42 VERTIGO: Primary | ICD-10-CM

## 2020-12-31 DIAGNOSIS — H81.13 BPPV (BENIGN PAROXYSMAL POSITIONAL VERTIGO), BILATERAL: ICD-10-CM

## 2020-12-31 PROCEDURE — 95992 CANALITH REPOSITIONING PROC: CPT | Performed by: PHYSICAL THERAPIST

## 2020-12-31 NOTE — PROGRESS NOTES
Vestibular Daily Progress Note    Visit#:2  Subjective   I am 99% better. At times I fel like the dizziness could start but it does not. Just a sec or two.   Objective         Positional Testing  Positional Testing: With infrared goggles  [place]-Hallpike Right: No nystagmus  [place]-Hallpike Left: Upbeat, left rotatory nystagmus  [place]-Hallpike Left Onset Time : 7 sec  [place]-Hallpike Left Duration Time : 40 sec          PROCEDURES AND MODALITIES:  Paraffin:    Moist Heat:    Ice:    E-Stim:    Ultrasound:    Ionto:   Traction:    See Exercise, Manual, and Modality Logs for complete treatment.   Other Procedure CPT 07608 minutes 0       Timed Code Treatment: 0 Minutes  Total Treatment Time: 20 Minutes    Assessment/Plan   R PC canalithiasis BPPV is resolved. He tested positive for L PC canalithiasis BPPV. This side iwas much less symptomatic at the I.E.Treated with CRP today. This should clear his remaining symptoms. Progressing nicely.     Progress per Plan of Care    Opal Esquivel, PT, DPT, CHT  Physical Therapist

## 2021-01-05 ENCOUNTER — TREATMENT (OUTPATIENT)
Dept: PHYSICAL THERAPY | Facility: CLINIC | Age: 86
End: 2021-01-05

## 2021-01-05 DIAGNOSIS — H81.13 BPPV (BENIGN PAROXYSMAL POSITIONAL VERTIGO), BILATERAL: ICD-10-CM

## 2021-01-05 DIAGNOSIS — R42 VERTIGO: Primary | ICD-10-CM

## 2021-01-05 PROCEDURE — 95992 CANALITH REPOSITIONING PROC: CPT | Performed by: PHYSICAL THERAPIST

## 2021-01-05 NOTE — PROGRESS NOTES
Vestibular Daily Progress Note    Visit#:3  Subjective   I had a some dizziness this morning when I was getting ready. It only lasted a few seconds. I still feel much better than I did before starting therapy.   Objective         Positional Testing  Positional Testing: With infrared goggles  [place]-Hallpike Right: (NA)  [place]-Hallpike Left: Upbeat, left rotatory nystagmus  [place]-Hallpike Left Onset Time : 7 sec  [place]pike Left Duration Time : 40 sec  Horizontal Roll Test Right: Left-beating  Horizontal Roll Test Left: Left-beating          PROCEDURES AND MODALITIES:  Paraffin:    Moist Heat:    Ice:    E-Stim:    Ultrasound:    Ionto:   Traction:    See Exercise, Manual, and Modality Logs for complete treatment.   Other Procedure CPT 05777 minutes 0       Timed Code Treatment: 0 Minutes  Total Treatment Time: 25 Minutes    Assessment/Plan   Patient is reporting improvement. Pt continues to have positive L DH test indicating L PC canalithiasis BPPV. He also has a left beating nystagmus despite a negative roll test. Issued L Epley for HEP.     Progress per Plan of Care    Opal Esquivel, PT, DPT, CHT  Physical Therapist

## 2021-01-07 ENCOUNTER — TREATMENT (OUTPATIENT)
Dept: PHYSICAL THERAPY | Facility: CLINIC | Age: 86
End: 2021-01-07

## 2021-01-07 DIAGNOSIS — H81.13 BPPV (BENIGN PAROXYSMAL POSITIONAL VERTIGO), BILATERAL: ICD-10-CM

## 2021-01-07 DIAGNOSIS — R42 VERTIGO: Primary | ICD-10-CM

## 2021-01-07 PROCEDURE — 95992 CANALITH REPOSITIONING PROC: CPT | Performed by: PHYSICAL THERAPIST

## 2021-01-07 NOTE — PROGRESS NOTES
Vestibular Daily Progress Note    Visit#:4  Subjective   Patient denies symptoms.   Objective         Positional Testing  Positional Testing: With infrared goggles  [place]-Hallpike Right: (NA)  [place]Hallpike Left: Left-beating Nystagmus  [place] Left Onset Time : (persistent)          PROCEDURES AND MODALITIES:  Paraffin:    Moist Heat:    Ice:    E-Stim:    Ultrasound:    Ionto:   Traction:    See Exercise, Manual, and Modality Logs for complete treatment.   Other Procedure CPT 41209 minutes 0       Timed Code Treatment: 0 Minutes  Total Treatment Time: 30 Minutes    Assessment/Plan   R and L PC are clear of BPPV. I suspect the left beating nystagmus is due to a R UVL but he is not having symptoms from this. Treatment for L LC was performed in the event this is BPPV, although not likely. Patient is symptom free and is appropriate for DC.    Progress per Plan of Care    Opal Esquivel, PT, DPT, CHT  Physical Therapist

## 2021-01-25 ENCOUNTER — TELEPHONE (OUTPATIENT)
Dept: FAMILY MEDICINE CLINIC | Facility: CLINIC | Age: 86
End: 2021-01-25

## 2021-01-25 NOTE — TELEPHONE ENCOUNTER
Debbie is aware that Carlos hasn't been seen at [facility] HE needs to be seen by [facility] as per his hospital d/c from 12/11/20  Can we please get this taken care of he is still having issues, dizzy & falling   Ok to call Carlos (hard of hearing) or Debbie (daughter)  Verbal consent given & will update at next visit for our office

## 2021-01-26 DIAGNOSIS — R42 DIZZINESS: Primary | ICD-10-CM

## 2021-03-10 DIAGNOSIS — K21.9 GASTROESOPHAGEAL REFLUX DISEASE WITHOUT ESOPHAGITIS: ICD-10-CM

## 2021-03-11 RX ORDER — PANTOPRAZOLE SODIUM 40 MG/1
TABLET, DELAYED RELEASE ORAL
Qty: 90 TABLET | Refills: 1 | Status: SHIPPED | OUTPATIENT
Start: 2021-03-11 | End: 2021-06-21

## 2021-03-24 DIAGNOSIS — N40.0 BENIGN PROSTATIC HYPERPLASIA WITHOUT LOWER URINARY TRACT SYMPTOMS: ICD-10-CM

## 2021-03-24 RX ORDER — TAMSULOSIN HYDROCHLORIDE 0.4 MG/1
CAPSULE ORAL
Qty: 90 CAPSULE | Refills: 1 | Status: SHIPPED | OUTPATIENT
Start: 2021-03-24 | End: 2021-09-16

## 2021-04-01 DIAGNOSIS — E78.2 MIXED HYPERLIPIDEMIA: ICD-10-CM

## 2021-04-02 RX ORDER — ATORVASTATIN CALCIUM 10 MG/1
TABLET, FILM COATED ORAL
Qty: 90 TABLET | Refills: 1 | Status: SHIPPED | OUTPATIENT
Start: 2021-04-02 | End: 2021-09-16

## 2021-04-26 ENCOUNTER — OFFICE VISIT (OUTPATIENT)
Dept: FAMILY MEDICINE CLINIC | Facility: CLINIC | Age: 86
End: 2021-04-26

## 2021-04-26 VITALS
TEMPERATURE: 97.4 F | DIASTOLIC BLOOD PRESSURE: 80 MMHG | HEIGHT: 65 IN | WEIGHT: 115 LBS | SYSTOLIC BLOOD PRESSURE: 122 MMHG | BODY MASS INDEX: 19.16 KG/M2 | HEART RATE: 80 BPM | OXYGEN SATURATION: 94 %

## 2021-04-26 DIAGNOSIS — E78.2 MIXED HYPERLIPIDEMIA: ICD-10-CM

## 2021-04-26 DIAGNOSIS — Z00.00 MEDICARE ANNUAL WELLNESS VISIT, SUBSEQUENT: Primary | ICD-10-CM

## 2021-04-26 PROCEDURE — 1159F MED LIST DOCD IN RCRD: CPT | Performed by: INTERNAL MEDICINE

## 2021-04-26 PROCEDURE — 96160 PT-FOCUSED HLTH RISK ASSMT: CPT | Performed by: INTERNAL MEDICINE

## 2021-04-26 PROCEDURE — 1170F FXNL STATUS ASSESSED: CPT | Performed by: INTERNAL MEDICINE

## 2021-04-26 PROCEDURE — G0439 PPPS, SUBSEQ VISIT: HCPCS | Performed by: INTERNAL MEDICINE

## 2021-04-26 NOTE — PATIENT INSTRUCTIONS
Medicare Wellness  Personal Prevention Plan of Service     Date of Office Visit:  2021  Encounter Provider:  Adam Feliz MD  Place of Service:  [facility]  Patient Name: Louis Villalba  :  10/20/1932    As part of the Medicare Wellness portion of your visit today, we are providing you with this personalized preventive plan of services (PPPS). This plan is based upon recommendations of the [place] Preventive Services Task Force (USPSTF) and the Advisory Committee on Immunization Practices (ACIP).    This lists the preventive care services that should be considered, and provides dates of when you are due. Items listed as completed are up-to-date and do not require any further intervention.    Health Maintenance   Topic Date Due   • TDAP/TD VACCINES (1 - Tdap) Never done   • ZOSTER VACCINE (1 of 2) Never done   • LIPID PANEL  2020   • INFLUENZA VACCINE  2021   • ANNUAL WELLNESS VISIT  2022   • COVID-19 Vaccine  Completed   • Pneumococcal Vaccine 65+  Completed       Orders Placed This Encounter   Procedures   • Comprehensive Metabolic Panel     Standing Status:   Future     Standing Expiration Date:   2022     Order Specific Question:   Release to patient     Answer:   Immediate   • Lipid Panel     Standing Status:   Future     Standing Expiration Date:   2022       Return in about 6 months (around 10/26/2021) for Next scheduled follow up.

## 2021-04-26 NOTE — PROGRESS NOTES
Subsequent Medicare Wellness Visit   The ABC's of the Annual Wellness Visit    Chief Complaint   Patient presents with   • Annual Exam       HPI:  Louis Villalba, -10/20/1932, is a 88 y.o. male who presents for a Subsequent Medicare Wellness Visit.  Follow-up for cholesterol.  Currently, has been feeling well without any myalgias, muscle aches, weakness, numbness, chest pain, short of breath or other issues.  Currently, is adherent with medication regimen of atorvastatin 10 mg and denies medication side effects. Is due for lab follow-up.    Recent Hospitalizations:  No hospitalization(s) within the last year..    Current Medical Providers:  Patient Care Team:  Adam Feliz MD as PCP - General (Internal Medicine)  Joe Davis OD (Optometry)  Stef Claire MD as Consulting Physician (Ophthalmology)  Eric Nuñez MD as Consulting Physician (Nephrology)  Hilton Chavarria MD as Consulting Physician (Pulmonary Disease)    Health Habits and Functional and Cognitive Screening and Depression Screening:  Functional & Cognitive Status 2021   Do you have difficulty preparing food and eating? No   Do you have difficulty bathing yourself, getting dressed or grooming yourself? No   Do you have difficulty using the toilet? No   Do you have difficulty moving around from place to place? No   Do you have trouble with steps or getting out of a bed or a chair? No   Current Diet Well Balanced Diet   Dental Exam Up to date   Eye Exam Up to date   Exercise (times per week) 2 times per week   Current Exercises Include Walking;Light Weights   Current Exercise Activities Include -   Do you need help using the phone?  No   Are you deaf or do you have serious difficulty hearing?  Yes   Do you need help with transportation? Yes   Do you need help shopping? No   Do you need help preparing meals?  No   Do you need help with housework?  No   Do you need help with laundry? No   Do you need help taking your  medications? No   Do you need help managing money? No   Do you ever drive or ride in a car without wearing a seat belt? No   Have you felt unusual stress, anger or loneliness in the last month? No   Who do you live with? Alone   If you need help, do you have trouble finding someone available to you? No   Have you been bothered in the last four weeks by sexual problems? No   Do you have difficulty concentrating, remembering or making decisions? No     Chronic hearing issues and has hearing aids.    Compared to one year ago, the patient feels his physical health is the same and his mental health is the same.    Depression Screen:  PHQ-2/PHQ-9 Depression Screening 4/26/2021   Little interest or pleasure in doing things 0   Feeling down, depressed, or hopeless 0   Trouble falling or staying asleep, or sleeping too much 0   Feeling tired or having little energy 0   Poor appetite or overeating 0   Feeling bad about yourself - or that you are a failure or have let yourself or your family down 0   Trouble concentrating on things, such as reading the newspaper or watching television 0   Moving or speaking so slowly that other people could have noticed. Or the opposite - being so fidgety or restless that you have been moving around a lot more than usual 0   Thoughts that you would be better off dead, or of hurting yourself in some way 0   Total Score 0       Falls Risk Assessment:  ANDREIA Fall Risk Clinician Key Questions   Have you fallen in the past year?: No  Do you feel unsteady with walking?: No  Are you worried about falling?: No      Past Medical/Family/Social History:  The following portions of the patient's history were reviewed and updated as appropriate: allergies, current medications, past family history, past medical history, past social history, past surgical history and problem list.    Allergies   Allergen Reactions   • Polymyxin B-Trimethoprim Unknown - Low Severity   • Sulfa Antibiotics Other (See Comments)      Cannot recall reaction         Current Outpatient Medications:   •  acetaminophen (TYLENOL) 325 MG tablet, Take 2 tablets by mouth Every 6 (Six) Hours As Needed for Mild Pain ., Disp: , Rfl:   •  albuterol (PROVENTIL HFA;VENTOLIN HFA) 108 (90 Base) MCG/ACT inhaler, Inhale 2 puffs Every 4 (Four) Hours As Needed for Wheezing., Disp: 2 inhaler, Rfl: 0  •  albuterol (PROVENTIL) (2.5 MG/3ML) 0.083% nebulizer solution, Take 2.5 mg by nebulization 4 (Four) Times a Day As Needed for Wheezing., Disp: 125 vial, Rfl: 2  •  Aspirin Buf,CaCarb-MgCarb-MgO, 81 MG tablet, Take 81 mg by mouth Daily., Disp: , Rfl:   •  atorvastatin (LIPITOR) 10 MG tablet, TAKE 1 TABLET BY MOUTH EVERY DAY, Disp: 90 tablet, Rfl: 1  •  brimonidine-timolol (COMBIGAN) 0.2-0.5 % ophthalmic solution, Administer 1 drop to both eyes Every 12 (Twelve) Hours., Disp: , Rfl:   •  desonide (DESOWEN) 0.05 % cream, APPLY TO LEFT LOWER LID BID, Disp: , Rfl:   •  docusate sodium (COLACE) 100 MG capsule, Take 100 mg by mouth 2 (Two) Times a Day., Disp: , Rfl:   •  erythromycin (ROMYCIN) 5 MG/GM ophthalmic ointment, Every Night., Disp: , Rfl:   •  Incruse Ellipta 62.5 MCG/INH aerosol powder , INL 1 PUFF PO D, Disp: , Rfl:   •  meclizine (ANTIVERT) 25 MG tablet, Take 1 tablet by mouth 3 (Three) Times a Day As Needed for Dizziness., Disp: 60 tablet, Rfl: 0  •  Multiple Vitamins-Minerals (VITEYES AREDS FORMULA PO), Take 2 tablets by mouth Daily., Disp: , Rfl:   •  ofloxacin (OCUFLOX) 0.3 % ophthalmic solution, , Disp: , Rfl:   •  pantoprazole (PROTONIX) 40 MG EC tablet, TAKE 1 TABLET BY MOUTH AT BEDTIME, Disp: 90 tablet, Rfl: 1  •  tamsulosin (FLOMAX) 0.4 MG capsule 24 hr capsule, TAKE 1 CAPSULE BY MOUTH EVERY DAY, Disp: 90 capsule, Rfl: 1  •  Tiotropium Bromide Monohydrate (SPIRIVA RESPIMAT) 2.5 MCG/ACT aerosol solution, Inhale 5 mg Daily., Disp: 1 inhaler, Rfl: 2  •  benzonatate (TESSALON) 100 MG capsule, Take 100 mg by mouth., Disp: , Rfl:     Aspirin use  counseling: Taking ASA appropriately as indicated    Current medication list contains no high risk medications.  No harmful drug interactions have been identified.     Family History   Problem Relation Age of Onset   • No Known Problems Mother    • No Known Problems Father        Social History     Tobacco Use   • Smoking status: Former Smoker     Packs/day: 1.50     Years: 10.00     Pack years: 15.00     Types: Cigarettes     Start date: 2000     Quit date:      Years since quittin.3   • Smokeless tobacco: Never Used   Substance Use Topics   • Alcohol use: Yes     Alcohol/week: 21.0 standard drinks     Types: 21 Cans of beer per week     Comment: drinks 2 beers daily       Past Surgical History:   Procedure Laterality Date   • APPENDECTOMY     • COLONOSCOPY     • ENDOSCOPY N/A 10/5/2016    Procedure: ESOPHAGOGASTRODUODENOSCOPY with biopsy;  Surgeon: Edward Gan MD;  Location: [facility] ENDOSCOPY;  Service:    • EYE SURGERY      cataract b/l surgery   • SKIN BIOPSY         Patient Active Problem List   Diagnosis   • Gastric outlet obstruction   • COPD (chronic obstructive pulmonary disease) (CMS/[facility])   • HTN (hypertension)   • Lung nodule   • CKD (chronic kidney disease) stage 3, GFR 30-59 ml/min (CMS/[facility])   • Benign prostatic hyperplasia without lower urinary tract symptoms   • Hyperkalemia   • Hyperlipidemia   • GERD (gastroesophageal reflux disease)   • Medicare annual wellness visit, subsequent   • BPPV (benign paroxysmal positional vertigo)   • Dizziness   • Shortness of breath   • Left otitis media   • Upper respiratory tract infection due to COVID-19 virus   • Elevated liver function tests       Review of Systems   Constitutional: Negative for activity change, appetite change, fatigue, fever, unexpected weight gain and unexpected weight loss.   HENT: Negative for nosebleeds, rhinorrhea, trouble swallowing and voice change.    Eyes: Positive for visual disturbance.        Chronic  "vision loss   Respiratory: Negative for cough, chest tightness, shortness of breath and wheezing.    Cardiovascular: Negative for chest pain, palpitations and leg swelling.   Gastrointestinal: Negative for abdominal pain, blood in stool, constipation, diarrhea, nausea, vomiting, GERD and indigestion.   Genitourinary: Negative for dysuria, frequency and hematuria.   Musculoskeletal: Negative for arthralgias, back pain and myalgias.   Skin: Negative for rash and bruise.   Neurological: Negative for dizziness, tremors, weakness, light-headedness, numbness, headache and memory problem.   Hematological: Negative for adenopathy. Does not bruise/bleed easily.   Psychiatric/Behavioral: Negative for sleep disturbance and depressed mood. The patient is not nervous/anxious.        Objective     Vitals:    04/26/21 1419   BP: 122/80   BP Location: Left arm   Patient Position: Sitting   Cuff Size: Adult   Pulse: 80   Temp: 97.4 °F (36.3 °C)   TempSrc: Temporal   SpO2: 94%   Weight: 52.2 kg (115 lb)   Height: 165.1 cm (65\")       Patient's Body mass index is 19.14 kg/m². BMI is within normal parameters. No follow-up required..      No exam data present    The patient has no evidence of cognitve impairment.     Physical Exam  Vitals and nursing note reviewed.   Constitutional:       General: He is not in acute distress.     Appearance: He is well-developed. He is not diaphoretic.   HENT:      Head: Normocephalic and atraumatic.      Right Ear: External ear normal.      Left Ear: External ear normal.      Nose: Nose normal.   Eyes:      Conjunctiva/sclera: Conjunctivae normal.      Pupils: Pupils are equal, round, and reactive to light.   Neck:      Thyroid: No thyromegaly.      Trachea: No tracheal deviation.   Cardiovascular:      Rate and Rhythm: Normal rate and regular rhythm.      Heart sounds: Normal heart sounds. No murmur heard.   No friction rub. No gallop.    Pulmonary:      Effort: Pulmonary effort is normal. No " respiratory distress.      Breath sounds: Normal breath sounds.   Abdominal:      General: Bowel sounds are normal.      Palpations: Abdomen is soft. There is no mass.      Tenderness: There is no abdominal tenderness. There is no guarding.   Musculoskeletal:         General: Normal range of motion.      Cervical back: Normal range of motion and neck supple.   Lymphadenopathy:      Cervical: No cervical adenopathy.   Skin:     General: Skin is warm and dry.      Capillary Refill: Capillary refill takes less than 2 seconds.      Findings: No rash.   Neurological:      Mental Status: He is alert and oriented to person, place, and time.      Motor: No abnormal muscle tone.      Deep Tendon Reflexes: Reflexes normal.   Psychiatric:         Behavior: Behavior normal.         Thought Content: Thought content normal.         Judgment: Judgment normal.         Recent Lab Results:  Lab Results   Component Value Date    GLU 99 11/03/2020     (H) 11/03/2020     Lab Results   Component Value Date    TRIG 86 12/30/2019    HDL 67 12/30/2019    VLDL 17 12/30/2019       Assessment/Plan   Age-appropriate Screening Schedule:  Refer to the list below for future screening recommendations based on patient's age, sex and/or medical conditions.      Health Maintenance   Topic Date Due   • TDAP/TD VACCINES (1 - Tdap) Never done   • ZOSTER VACCINE (1 of 2) Never done   • LIPID PANEL  12/30/2020   • INFLUENZA VACCINE  08/01/2021       Medicare Risks and Personalized Health Plan:  Glaucoma Risk  Immunizations Discussed/Encouraged (specific immunizations; Tdap and Shingrix )      CMS-Preventive Services Quick Reference  Medicare Preventive Services Addressed:  Annual Wellness Visit (AWV)  Glaucoma screening (for individuals with diabetes mellitus, family history of glaucoma, -Americans (> or =) age 50, -Americans (> or =) age 65)    Advance Care Planning:  ACP discussion was held with the patient during this visit.  Patient has an advance directive in EMR which is still valid.     Diagnoses and all orders for this visit:    1. Medicare annual wellness visit, subsequent (Primary)    2. Mixed hyperlipidemia  -     Comprehensive Metabolic Panel; Future  -     Lipid Panel; Future      Annual wellness visit reviewed with patient.  All past history, medications, social history, and problem list were reviewed.  Discussed advanced directives and living will.  Patient has living will: Living will: Directive already scanned in chart.  Discussed fall risk and precautions encourage removing throw rugs and using grab bars within the home and bathroom.  Will check the labs as ordered above to evaluate the blood sugars, kidney, liver, cholesterol for screening.  Discussed flu shot recommended to get the high-dose influenza vaccine annually in the fall.  Pneumovax-23 and prevnar-13 up to date.  Tdap and Shingrix vaccination series recommended.  Encourage follow-up with the eye doctor on annual basis for glaucoma evaluation.  Discussed weight and encouraged exercise as tolerated while following a healthy diet.   An After Visit Summary and PPPS with all of these plans were given to the patient.      Follow Up:  No follow-ups on file.           · COVID-19 Precautions - Patient was compliant in wearing a mask. When I saw the patient, I used appropriate personal protective equipment (PPE) including mask and eye shield (standard procedure).  Additionally, I used gown and gloves if indicated.  Hand hygiene was completed before and after seeing the patient.  · Dictated utilizing Dragon Dictation

## 2021-06-18 DIAGNOSIS — K21.9 GASTROESOPHAGEAL REFLUX DISEASE WITHOUT ESOPHAGITIS: ICD-10-CM

## 2021-06-21 RX ORDER — PANTOPRAZOLE SODIUM 40 MG/1
TABLET, DELAYED RELEASE ORAL
Qty: 90 TABLET | Refills: 1 | Status: SHIPPED | OUTPATIENT
Start: 2021-06-21 | End: 2021-11-11

## 2021-09-16 DIAGNOSIS — N40.0 BENIGN PROSTATIC HYPERPLASIA WITHOUT LOWER URINARY TRACT SYMPTOMS: ICD-10-CM

## 2021-09-16 DIAGNOSIS — E78.2 MIXED HYPERLIPIDEMIA: ICD-10-CM

## 2021-09-16 RX ORDER — ATORVASTATIN CALCIUM 10 MG/1
TABLET, FILM COATED ORAL
Qty: 90 TABLET | Refills: 1 | Status: SHIPPED | OUTPATIENT
Start: 2021-09-16 | End: 2022-03-15

## 2021-09-16 RX ORDER — TAMSULOSIN HYDROCHLORIDE 0.4 MG/1
CAPSULE ORAL
Qty: 90 CAPSULE | Refills: 1 | Status: SHIPPED | OUTPATIENT
Start: 2021-09-16 | End: 2022-03-15

## 2021-09-16 NOTE — TELEPHONE ENCOUNTER
Rx Refill Note  Requested Prescriptions     Pending Prescriptions Disp Refills   • tamsulosin (FLOMAX) 0.4 MG capsule 24 hr capsule [Pharmacy Med Name: TAMSULOSIN 0.4MG CAPSULES] 90 capsule 1     Sig: TAKE 1 CAPSULE BY MOUTH EVERY DAY   • atorvastatin (LIPITOR) 10 MG tablet [Pharmacy Med Name: ATORVASTATIN 10MG TABLETS] 90 tablet 1     Sig: TAKE 1 TABLET BY MOUTH EVERY DAY      Last office visit with prescribing clinician: 4/26/2021      Next office visit with prescribing clinician: Visit date not found            Caitlin Thibodeaux MA  09/16/21, 16:44 EDT

## 2021-10-11 ENCOUNTER — OFFICE VISIT (OUTPATIENT)
Dept: FAMILY MEDICINE CLINIC | Facility: CLINIC | Age: 86
End: 2021-10-11

## 2021-10-11 VITALS
HEART RATE: 80 BPM | DIASTOLIC BLOOD PRESSURE: 68 MMHG | OXYGEN SATURATION: 96 % | HEIGHT: 65 IN | TEMPERATURE: 98 F | WEIGHT: 114 LBS | BODY MASS INDEX: 18.99 KG/M2 | SYSTOLIC BLOOD PRESSURE: 124 MMHG

## 2021-10-11 DIAGNOSIS — H81.10 BENIGN PAROXYSMAL POSITIONAL VERTIGO, UNSPECIFIED LATERALITY: ICD-10-CM

## 2021-10-11 DIAGNOSIS — R13.19 OTHER DYSPHAGIA: ICD-10-CM

## 2021-10-11 DIAGNOSIS — Z87.19 HISTORY OF ESOPHAGEAL STRICTURE: ICD-10-CM

## 2021-10-11 DIAGNOSIS — R42 DIZZINESS: Primary | ICD-10-CM

## 2021-10-11 PROCEDURE — 99213 OFFICE O/P EST LOW 20 MIN: CPT | Performed by: INTERNAL MEDICINE

## 2021-10-11 RX ORDER — MECLIZINE HYDROCHLORIDE 25 MG/1
25 TABLET ORAL 3 TIMES DAILY PRN
Qty: 60 TABLET | Refills: 1 | Status: SHIPPED | OUTPATIENT
Start: 2021-10-11 | End: 2022-02-24 | Stop reason: SDUPTHER

## 2021-10-11 NOTE — PROGRESS NOTES
Subjective   Louis iVllalba is a 88 y.o. male.     Chief Complaint   Patient presents with   • Difficulty Swallowing   • Dizziness       History of Present Illness   Patient was seen in ER on 9/22/21 with dehydration dizziness nausea and had CT with no acute changes. Admitted overnight per patient but appears more to be emergency room only visit. The symptoms are improved and states only some mild intermittent dizziness not related to position or head movement.  Meclizine does help.  Having difficulty swallowing solids again like he was having prior to last EGD and esopahgeal dilation secondary to stenosis cared for by Dr Villatoro.    The following portions of the patient's history were reviewed and updated as appropriate: allergies, current medications, past family history, past medical history, past social history, past surgical history and problem list.    Depression Screen:  PHQ-2/PHQ-9 Depression Screening 4/26/2021   Little interest or pleasure in doing things 0   Feeling down, depressed, or hopeless 0   Trouble falling or staying asleep, or sleeping too much 0   Feeling tired or having little energy 0   Poor appetite or overeating 0   Feeling bad about yourself - or that you are a failure or have let yourself or your family down 0   Trouble concentrating on things, such as reading the newspaper or watching television 0   Moving or speaking so slowly that other people could have noticed. Or the opposite - being so fidgety or restless that you have been moving around a lot more than usual 0   Thoughts that you would be better off dead, or of hurting yourself in some way 0   Total Score 0       Past Medical History:   Diagnosis Date   • Anemia    • Arthritis    • Cancer (HCC)     skin: BASAL CELL on face, excised- remote   • Colon polyp    • History of transfusion    • Hyperlipidemia    • Macular degeneration        Past Surgical History:   Procedure Laterality Date   • APPENDECTOMY  1958   • COLONOSCOPY  2013    • ENDOSCOPY N/A 10/5/2016    Procedure: ESOPHAGOGASTRODUODENOSCOPY with biopsy;  Surgeon: Edward Gan MD;  Location: [facility] ENDOSCOPY;  Service:    • EYE SURGERY      cataract b/l surgery   • SKIN BIOPSY         Family History   Problem Relation Age of Onset   • No Known Problems Mother    • No Known Problems Father        Social History     Socioeconomic History   • Marital status:    Tobacco Use   • Smoking status: Former Smoker     Packs/day: 1.50     Years: 10.00     Pack years: 15.00     Types: Cigarettes     Start date: 2000     Quit date:      Years since quittin.7   • Smokeless tobacco: Never Used   Substance and Sexual Activity   • Alcohol use: Yes     Alcohol/week: 21.0 standard drinks     Types: 21 Cans of beer per week     Comment: drinks 2 beers daily   • Drug use: No   • Sexual activity: Defer       Current Outpatient Medications   Medication Sig Dispense Refill   • acetaminophen (TYLENOL) 325 MG tablet Take 2 tablets by mouth Every 6 (Six) Hours As Needed for Mild Pain .     • albuterol (PROVENTIL HFA;VENTOLIN HFA) 108 (90 Base) MCG/ACT inhaler Inhale 2 puffs Every 4 (Four) Hours As Needed for Wheezing. 2 inhaler 0   • albuterol (PROVENTIL) (2.5 MG/3ML) 0.083% nebulizer solution Take 2.5 mg by nebulization 4 (Four) Times a Day As Needed for Wheezing. 125 vial 2   • Aspirin Buf,CaCarb-MgCarb-MgO, 81 MG tablet Take 81 mg by mouth Daily.     • atorvastatin (LIPITOR) 10 MG tablet TAKE 1 TABLET BY MOUTH EVERY DAY 90 tablet 1   • brimonidine-timolol (COMBIGAN) 0.2-0.5 % ophthalmic solution Administer 1 drop to both eyes Every 12 (Twelve) Hours.     • desonide (DESOWEN) 0.05 % cream APPLY TO LEFT LOWER LID BID     • docusate sodium (COLACE) 100 MG capsule Take 100 mg by mouth 2 (Two) Times a Day.     • Incruse Ellipta 62.5 MCG/INH aerosol powder  INL 1 PUFF PO D     • meclizine (ANTIVERT) 25 MG tablet Take 1 tablet by mouth 3 (Three) Times a Day As Needed for Dizziness. 60  "tablet 1   • Multiple Vitamins-Minerals (VITEYES AREDS FORMULA PO) Take 2 tablets by mouth Daily.     • ofloxacin (OCUFLOX) 0.3 % ophthalmic solution      • pantoprazole (PROTONIX) 40 MG EC tablet TAKE 1 TABLET BY MOUTH AT BEDTIME 90 tablet 1   • tamsulosin (FLOMAX) 0.4 MG capsule 24 hr capsule TAKE 1 CAPSULE BY MOUTH EVERY DAY 90 capsule 1   • Tiotropium Bromide Monohydrate (SPIRIVA RESPIMAT) 2.5 MCG/ACT aerosol solution Inhale 5 mg Daily. 1 inhaler 2   • benzonatate (TESSALON) 100 MG capsule Take 100 mg by mouth.       No current facility-administered medications for this visit.       Review of Systems   Constitutional: Negative for activity change, appetite change, fatigue, fever, unexpected weight gain and unexpected weight loss.   HENT: Negative for nosebleeds, rhinorrhea, trouble swallowing and voice change.    Eyes: Negative for visual disturbance.   Respiratory: Negative for cough, chest tightness, shortness of breath and wheezing.    Cardiovascular: Negative for chest pain, palpitations and leg swelling.   Gastrointestinal: Negative for abdominal pain, blood in stool, constipation, diarrhea, nausea, vomiting, GERD and indigestion.        Difficulty swallowing solid food   Genitourinary: Negative for dysuria, frequency and hematuria.   Musculoskeletal: Negative for arthralgias, back pain and myalgias.   Skin: Negative for rash and bruise.   Neurological: Positive for dizziness. Negative for tremors, weakness, light-headedness, numbness, headache and memory problem.   Hematological: Negative for adenopathy. Does not bruise/bleed easily.   Psychiatric/Behavioral: Negative for sleep disturbance and depressed mood. The patient is not nervous/anxious.        Objective   /68 (BP Location: Left arm, Patient Position: Sitting, Cuff Size: Adult)   Pulse 80   Temp 98 °F (36.7 °C) (Temporal)   Ht 165.1 cm (65\")   Wt 51.7 kg (114 lb)   SpO2 96%   BMI 18.97 kg/m²     Physical Exam  Vitals and nursing note " reviewed.   Constitutional:       General: He is not in acute distress.     Appearance: He is well-developed. He is not diaphoretic.      Comments: Thin male in no apparent distress who is active without any distress.   HENT:      Head: Normocephalic and atraumatic.      Right Ear: External ear normal.      Left Ear: External ear normal.      Nose: Nose normal.   Eyes:      Conjunctiva/sclera: Conjunctivae normal.      Pupils: Pupils are equal, round, and reactive to light.   Neck:      Thyroid: No thyromegaly.      Trachea: No tracheal deviation.   Cardiovascular:      Rate and Rhythm: Normal rate and regular rhythm.      Heart sounds: Normal heart sounds. No murmur heard.  No friction rub. No gallop.    Pulmonary:      Effort: Pulmonary effort is normal. No respiratory distress.      Breath sounds: Normal breath sounds.   Abdominal:      General: Bowel sounds are normal.      Palpations: Abdomen is soft. There is no mass.      Tenderness: There is no abdominal tenderness. There is no guarding.   Musculoskeletal:         General: Normal range of motion.      Cervical back: Normal range of motion and neck supple.   Lymphadenopathy:      Cervical: No cervical adenopathy.   Skin:     General: Skin is warm and dry.      Capillary Refill: Capillary refill takes less than 2 seconds.      Findings: No rash.   Neurological:      Mental Status: He is alert and oriented to person, place, and time.      Motor: No abnormal muscle tone.      Deep Tendon Reflexes: Reflexes normal.   Psychiatric:         Behavior: Behavior normal.         Thought Content: Thought content normal.         Judgment: Judgment normal.         Recent Results (from the past 2016 hour(s))   TROPONIN (IN-HOUSE)    Collection Time: 09/22/21  3:19 PM    Specimen: Fresh Frozen Plasma    Specimen type and source: Plasma, Blood   Result Value Ref Range    Troponin I <0.010 0.000 - 0.034 ng/mL   CBC AND DIFFERENTIAL    Collection Time: 09/22/21  3:19 PM     Specimen type and source: Whole Blood, Blood   Result Value Ref Range    WBC 11.52 (H) 4.5 - 11.0 10*3/uL    RBC 4.10 (L) 4.5 - 5.9 10*6/uL    Hemoglobin 12.4 (L) 13.5 - 17.5 g/dL    Hematocrit 37.8 (L) 41.0 - 53.0 %    MCV 92.2 80.0 - 100.0 fL    MCH 30.2 26.0 - 34.0 pg    MCHC 32.8 31.0 - 37.0 g/dL    RDW 13.7 12.0 - 16.8 %    Platelets 191 140 - 440 10*3/uL    MPV 11.0 8.4 - 12.4 fL    Differential Type Hospital CBC w/AutoDiff (arb'U)    Neutrophil Rel % 86.6 (H) 45 - 80 %    Lymphocyte Rel % 7.2 (L) 15 - 50 %    Monocyte Rel % 5.4 0 - 15 %    Eosinophil % 0.1 0 - 7 %    Basophil Rel % 0.3 0 - 2 %    Immature Grans % 0.4 0.0 - 1.0 %    nRBC 0 0 /100(WBC)    Neutrophils Absolute 9.98 (H) 2.0 - 8.8 10*3/uL    Lymphocytes Absolute 0.83 0.7 - 5.5 10*3/uL    Monocytes Absolute 0.62 0.0 - 1.7 10*3/uL    Eosinophils Absolute 0.01 0.0 - 0.8 10*3/uL    Basophils Absolute 0.03 0.0 - 0.2 10*3/uL    Immature Grans, Absolute 0.05 0.00 - 0.10 10*3/uL   PROTIME-INR    Collection Time: 09/22/21  3:19 PM    Specimen: Fresh Frozen Plasma    Specimen type and source: Plasma, Blood   Result Value Ref Range    Protime 10.8 10.3 - 13.3 s    INR 0.9 INR   APTT    Collection Time: 09/22/21  3:19 PM    Specimen: Fresh Frozen Plasma    Specimen type and source: Plasma, Blood   Result Value Ref Range    PTT 29.1 25.1 - 36.5 s   TYPE AND SCREEN    Collection Time: 09/22/21  3:19 PM    Specimen type and source: Whole Blood, Blood   Result Value Ref Range    ABORh O Positive     Antibody Screen Negative     Crossmatch Expiration 09/25/2021,2359    CBC AND DIFFERENTIAL    Collection Time: 09/23/21  4:05 AM    Specimen type and source: Whole Blood, Blood   Result Value Ref Range    WBC 10.18 4.5 - 11.0 10*3/uL    RBC 3.66 (L) 4.5 - 5.9 10*6/uL    Hemoglobin 11.2 (L) 13.5 - 17.5 g/dL    Hematocrit 34.3 (L) 41.0 - 53.0 %    MCV 93.7 80.0 - 100.0 fL    MCH 30.6 26.0 - 34.0 pg    MCHC 32.7 31.0 - 37.0 g/dL    RDW 13.8 12.0 - 16.8 %    Platelets 155  140 - 440 10*3/uL    MPV 11.0 8.4 - 12.4 fL    Differential Type Hospital CBC w/AutoDiff (arb'U)    Neutrophil Rel % 70.8 45 - 80 %    Lymphocyte Rel % 17.3 15 - 50 %    Monocyte Rel % 10.4 0 - 15 %    Eosinophil % 0.9 0 - 7 %    Basophil Rel % 0.3 0 - 2 %    Immature Grans % 0.3 0.0 - 1.0 %    nRBC 0 0 /100(WBC)    Neutrophils Absolute 7.21 2.0 - 8.8 10*3/uL    Lymphocytes Absolute 1.76 0.7 - 5.5 10*3/uL    Monocytes Absolute 1.06 0.0 - 1.7 10*3/uL    Eosinophils Absolute 0.09 0.0 - 0.8 10*3/uL    Basophils Absolute 0.03 0.0 - 0.2 10*3/uL    Immature Grans, Absolute 0.03 0.00 - 0.10 10*3/uL   MAGNESIUM    Collection Time: 09/23/21  4:05 AM    Specimen: Fresh Frozen Plasma    Specimen type and source: Plasma, Blood   Result Value Ref Range    Magnesium 1.8 1.6 - 2.6 mg/dL   PHOSPHORUS    Collection Time: 09/23/21  4:05 AM    Specimen: Fresh Frozen Plasma    Specimen type and source: Plasma, Blood   Result Value Ref Range    Phosphorus 2.8 2.3 - 4.7 mg/dL     Assessment/Plan   Diagnoses and all orders for this visit:    1. Dizziness (Primary)    2. Other dysphagia  -     Ambulatory Referral to Gastroenterology    3. History of esophageal stricture  -     Ambulatory Referral to Gastroenterology    4. Benign paroxysmal positional vertigo, unspecified laterality  -     meclizine (ANTIVERT) 25 MG tablet; Take 1 tablet by mouth 3 (Three) Times a Day As Needed for Dizziness.  Dispense: 60 tablet; Refill: 1    Discussed with patient dizziness appears to be more of a vertigo type issue.  Is tolerating well and improving with use of meclizine as needed.  Continue at this time.  Patient dysphagia and history of esophageal strictures.  Will refer to new gastroenterologist as his old one Dr. Villatoro who did his past dilations has retired.           · COVID-19 Precautions - Patient was compliant in wearing a mask. When I saw the patient, I used appropriate personal protective equipment (PPE) including mask and eye shield (standard  procedure).  Additionally, I used gown and gloves if indicated.  Hand hygiene was completed before and after seeing the patient.  · Dictated utilizing Dragon Dictation

## 2021-11-11 ENCOUNTER — TELEPHONE (OUTPATIENT)
Dept: GASTROENTEROLOGY | Facility: CLINIC | Age: 86
End: 2021-11-11

## 2021-11-11 ENCOUNTER — OFFICE VISIT (OUTPATIENT)
Dept: GASTROENTEROLOGY | Facility: CLINIC | Age: 86
End: 2021-11-11

## 2021-11-11 VITALS — HEIGHT: 64 IN | WEIGHT: 116 LBS | TEMPERATURE: 96.3 F | BODY MASS INDEX: 19.81 KG/M2

## 2021-11-11 DIAGNOSIS — R13.10 DYSPHAGIA, UNSPECIFIED TYPE: Primary | ICD-10-CM

## 2021-11-11 DIAGNOSIS — K21.9 GASTROESOPHAGEAL REFLUX DISEASE, UNSPECIFIED WHETHER ESOPHAGITIS PRESENT: ICD-10-CM

## 2021-11-11 PROCEDURE — 99214 OFFICE O/P EST MOD 30 MIN: CPT | Performed by: NURSE PRACTITIONER

## 2021-11-11 RX ORDER — PANTOPRAZOLE SODIUM 40 MG/1
40 TABLET, DELAYED RELEASE ORAL 2 TIMES DAILY
Qty: 180 TABLET | Refills: 3 | Status: SHIPPED | OUTPATIENT
Start: 2021-11-11 | End: 2022-09-14

## 2021-11-11 NOTE — TELEPHONE ENCOUNTER
BROOKS Thrasher for EGD on 02/01/2022  arrive at  11am . Gave Prep instructions in office.    Advised PT  that  will call with final arrival time  24 hrs before procedure. If they do not get a phone call, arrival time will stay the same as given on instructions

## 2021-11-11 NOTE — PROGRESS NOTES
Chief Complaint   Patient presents with   • Difficulty Swallowing       HPI    Louis Villalba is a  89 y.o. male here to establish care as a new patient for complaints of esophageal dysphagia.    This patient will also follow with Dr. Weller.    Onset of current symptoms approximately 4 months ago.  Patient reports issues with solid food.  Feels food lodged in the sternal notch will regurgitate for relief.  Becoming of more frequent issue occurring almost daily.  No issues with liquids.  Longstanding history of GERD.  He is on Protonix 40 mg once daily.  Denies nausea, vomiting, poor appetite, or weight loss.  Patient reports having esophageal dilation for stricture greater than 12 years ago performed by Dr. Villatoro unavailable for review.    No diarrhea, constipation, or rectal bleeding.    Patient's last EGD was 2016 performed by Dr. Gan secondary to complaints of nausea and vomiting.  He underwent an EGD with findings of hiatal hernia, gastritis, and a pyloric ulcer.    Past Medical History:   Diagnosis Date   • Anemia    • Arthritis    • Cancer (HCC)     skin: BASAL CELL on face, excised- remote   • Colon polyp    • History of transfusion    • Hyperlipidemia    • Macular degeneration        Past Surgical History:   Procedure Laterality Date   • APPENDECTOMY  1958   • COLONOSCOPY  2013   • ENDOSCOPY N/A 10/5/2016    Procedure: ESOPHAGOGASTRODUODENOSCOPY with biopsy;  Surgeon: Edward Gan MD;  Location: [facility] ENDOSCOPY;  Service:    • EYE SURGERY      cataract b/l surgery   • SKIN BIOPSY         Scheduled Meds:     Continuous Infusions: No current facility-administered medications for this visit.      PRN Meds:     Allergies   Allergen Reactions   • Polymyxin B-Trimethoprim Unknown - Low Severity   • Sulfa Antibiotics Other (See Comments)     Cannot recall reaction       Social History     Socioeconomic History   • Marital status:    Tobacco Use   • Smoking status: Former Smoker      Packs/day: 1.50     Years: 10.00     Pack years: 15.00     Types: Cigarettes     Start date: 2000     Quit date: 2010     Years since quittin.8   • Smokeless tobacco: Never Used   Substance and Sexual Activity   • Alcohol use: Yes     Alcohol/week: 21.0 standard drinks     Types: 21 Cans of beer per week     Comment: drinks 2 beers daily   • Drug use: No   • Sexual activity: Defer       Family History   Problem Relation Age of Onset   • No Known Problems Mother    • No Known Problems Father        Review of Systems   Constitutional: Negative for activity change, appetite change, fatigue, fever and unexpected weight change.   HENT: Positive for trouble swallowing.    Eyes: Negative.    Respiratory: Negative.  Negative for apnea, cough, choking, chest tightness, shortness of breath and wheezing.    Cardiovascular: Negative.  Negative for chest pain, palpitations and leg swelling.   Gastrointestinal: Negative for abdominal distention, abdominal pain, anal bleeding, blood in stool, constipation, diarrhea, nausea, rectal pain and vomiting.   Endocrine: Negative.    Genitourinary: Negative.    Musculoskeletal: Negative.    Allergic/Immunologic: Negative.    Neurological: Negative.    Hematological: Negative.    Psychiatric/Behavioral: Negative.        Vitals:    21 1028   Temp: 96.3 °F (35.7 °C)       Physical Exam  Constitutional:       Appearance: He is well-developed.   Abdominal:      General: Bowel sounds are normal. There is no distension.      Palpations: Abdomen is soft. There is no mass.      Tenderness: There is no abdominal tenderness. There is no guarding.      Hernia: No hernia is present.   Skin:     General: Skin is warm and dry.      Capillary Refill: Capillary refill takes less than 2 seconds.   Neurological:      Mental Status: He is alert and oriented to person, place, and time.   Psychiatric:         Behavior: Behavior normal.     Assessment    Diagnoses and all orders for this  visit:    1. Dysphagia, unspecified type (Primary)  -     Case Request; Standing  -     Case Request    2. Gastroesophageal reflux disease, unspecified whether esophagitis present  -     Case Request; Standing  -     Case Request    Other orders  -     pantoprazole (PROTONIX) 40 MG EC tablet; Take 1 tablet by mouth 2 (Two) Times a Day.  Dispense: 180 tablet; Refill: 3    Plan    Very pleasant 89-year-old male seen today for dysphagia with a longstanding history of GERD.  Differential diagnosis for the dysphagia includes GERD, peptic stricture, ring/band, eosinophilic esophagitis, or esophageal dysmotility or hypersensitivity, malignancy. We will proceed with EGD and possible empiric dilation of the esophagus. Meanwhile, we will assess for improvement in symptoms on twice daily dosing PPI.  Procedure to be done at [facility] by Dr. Weller.  Follow an antireflux diet.  Further recommendations and follow-up pending the aforementioned work-up.         MARTIR Gutierrez  [facility], [facility]  [address] [place] [address]  Office: [phone]

## 2022-01-18 ENCOUNTER — TRANSCRIBE ORDERS (OUTPATIENT)
Dept: ADMINISTRATIVE | Facility: HOSPITAL | Age: 87
End: 2022-01-18

## 2022-01-18 DIAGNOSIS — Z01.818 OTHER SPECIFIED PRE-OPERATIVE EXAMINATION: Primary | ICD-10-CM

## 2022-01-27 ENCOUNTER — TELEPHONE (OUTPATIENT)
Dept: GASTROENTEROLOGY | Facility: CLINIC | Age: 87
End: 2022-01-27

## 2022-01-27 DIAGNOSIS — Z01.818 PRE-OP TESTING: Primary | ICD-10-CM

## 2022-01-29 ENCOUNTER — LAB (OUTPATIENT)
Dept: LAB | Facility: HOSPITAL | Age: 87
End: 2022-01-29

## 2022-01-29 DIAGNOSIS — Z01.818 OTHER SPECIFIED PRE-OPERATIVE EXAMINATION: ICD-10-CM

## 2022-01-29 LAB — SARS-COV-2 ORF1AB RESP QL NAA+PROBE: NOT DETECTED

## 2022-01-29 PROCEDURE — C9803 HOPD COVID-19 SPEC COLLECT: HCPCS

## 2022-01-29 PROCEDURE — U0004 COV-19 TEST NON-CDC HGH THRU: HCPCS

## 2022-02-01 ENCOUNTER — ANESTHESIA EVENT (OUTPATIENT)
Dept: GASTROENTEROLOGY | Facility: HOSPITAL | Age: 87
End: 2022-02-01

## 2022-02-01 ENCOUNTER — ANESTHESIA (OUTPATIENT)
Dept: GASTROENTEROLOGY | Facility: HOSPITAL | Age: 87
End: 2022-02-01

## 2022-02-01 ENCOUNTER — HOSPITAL ENCOUNTER (OUTPATIENT)
Facility: HOSPITAL | Age: 87
Setting detail: HOSPITAL OUTPATIENT SURGERY
Discharge: HOME | End: 2022-02-01
Attending: INTERNAL MEDICINE | Admitting: INTERNAL MEDICINE

## 2022-02-01 VITALS
HEIGHT: 64 IN | WEIGHT: 115.5 LBS | HEART RATE: 107 BPM | RESPIRATION RATE: 16 BRPM | SYSTOLIC BLOOD PRESSURE: 147 MMHG | DIASTOLIC BLOOD PRESSURE: 85 MMHG | BODY MASS INDEX: 19.72 KG/M2 | OXYGEN SATURATION: 94 %

## 2022-02-01 DIAGNOSIS — R13.10 DYSPHAGIA, UNSPECIFIED TYPE: ICD-10-CM

## 2022-02-01 DIAGNOSIS — K21.9 GASTROESOPHAGEAL REFLUX DISEASE, UNSPECIFIED WHETHER ESOPHAGITIS PRESENT: ICD-10-CM

## 2022-02-01 PROCEDURE — 25010000002 PROPOFOL 10 MG/ML EMULSION: Performed by: ANESTHESIOLOGY

## 2022-02-01 PROCEDURE — 25010000002 PHENYLEPHRINE 10 MG/ML SOLUTION: Performed by: ANESTHESIOLOGY

## 2022-02-01 PROCEDURE — S0260 H&P FOR SURGERY: HCPCS | Performed by: INTERNAL MEDICINE

## 2022-02-01 PROCEDURE — 43249 ESOPH EGD DILATION <30 MM: CPT | Performed by: INTERNAL MEDICINE

## 2022-02-01 PROCEDURE — 88305 TISSUE EXAM BY PATHOLOGIST: CPT | Performed by: INTERNAL MEDICINE

## 2022-02-01 PROCEDURE — 43239 EGD BIOPSY SINGLE/MULTIPLE: CPT | Performed by: INTERNAL MEDICINE

## 2022-02-01 RX ORDER — SUCRALFATE ORAL 1 G/10ML
1 SUSPENSION ORAL 2 TIMES DAILY
Qty: 600 ML | Refills: 1 | Status: SHIPPED | OUTPATIENT
Start: 2022-02-01 | End: 2022-03-30

## 2022-02-01 RX ORDER — PHENYLEPHRINE HYDROCHLORIDE 10 MG/ML
INJECTION INTRAVENOUS AS NEEDED
Status: DISCONTINUED | OUTPATIENT
Start: 2022-02-01 | End: 2022-02-01 | Stop reason: SURG

## 2022-02-01 RX ORDER — PROPOFOL 10 MG/ML
VIAL (ML) INTRAVENOUS AS NEEDED
Status: DISCONTINUED | OUTPATIENT
Start: 2022-02-01 | End: 2022-02-01 | Stop reason: SURG

## 2022-02-01 RX ORDER — LIDOCAINE HYDROCHLORIDE 20 MG/ML
INJECTION, SOLUTION INFILTRATION; PERINEURAL AS NEEDED
Status: DISCONTINUED | OUTPATIENT
Start: 2022-02-01 | End: 2022-02-01 | Stop reason: SURG

## 2022-02-01 RX ORDER — PROPOFOL 10 MG/ML
VIAL (ML) INTRAVENOUS CONTINUOUS PRN
Status: DISCONTINUED | OUTPATIENT
Start: 2022-02-01 | End: 2022-02-01 | Stop reason: SURG

## 2022-02-01 RX ORDER — SODIUM CHLORIDE, SODIUM LACTATE, POTASSIUM CHLORIDE, CALCIUM CHLORIDE 600; 310; 30; 20 MG/100ML; MG/100ML; MG/100ML; MG/100ML
1000 INJECTION, SOLUTION INTRAVENOUS CONTINUOUS
Status: DISCONTINUED | OUTPATIENT
Start: 2022-02-01 | End: 2022-02-01 | Stop reason: HOSPADM

## 2022-02-01 RX ORDER — SODIUM CHLORIDE 0.9 % (FLUSH) 0.9 %
10 SYRINGE (ML) INJECTION AS NEEDED
Status: DISCONTINUED | OUTPATIENT
Start: 2022-02-01 | End: 2022-02-01 | Stop reason: HOSPADM

## 2022-02-01 RX ADMIN — PROPOFOL 80 MG: 10 INJECTION, EMULSION INTRAVENOUS at 12:19

## 2022-02-01 RX ADMIN — Medication 200 MCG/KG/MIN: at 12:19

## 2022-02-01 RX ADMIN — PHENYLEPHRINE HYDROCHLORIDE 100 MCG: 10 INJECTION, SOLUTION INTRAVENOUS at 12:34

## 2022-02-01 RX ADMIN — SODIUM CHLORIDE, POTASSIUM CHLORIDE, SODIUM LACTATE AND CALCIUM CHLORIDE 1000 ML: 600; 310; 30; 20 INJECTION, SOLUTION INTRAVENOUS at 11:09

## 2022-02-01 RX ADMIN — LIDOCAINE HYDROCHLORIDE 40 MG: 20 INJECTION, SOLUTION INFILTRATION; PERINEURAL at 12:18

## 2022-02-01 NOTE — ANESTHESIA PREPROCEDURE EVALUATION
Anesthesia Evaluation     no history of anesthetic complications:               Airway   Mallampati: II  TM distance: >3 FB  Neck ROM: full  Dental    (+) poor dentition    Pulmonary    (+) a smoker Former, COPD moderate, shortness of breath, recent URI,   Cardiovascular     (+) hypertension, hyperlipidemia,   (-) dysrhythmias, angina      Neuro/Psych  (+) dizziness/light headedness,     GI/Hepatic/Renal/Endo    (+)  GERD,  liver disease history of elevated LFT, renal disease CRI,   (-) hepatitis    Musculoskeletal     Abdominal    Substance History      OB/GYN          Other   blood dyscrasia anemia,                   Anesthesia Plan    ASA 3     MAC     intravenous induction     Anesthetic plan, all risks, benefits, and alternatives have been provided, discussed and informed consent has been obtained with: patient.        CODE STATUS:

## 2022-02-01 NOTE — DISCHARGE INSTRUCTIONS
For the next 24 hours patient needs to be with a responsible adult.    For 24 hours DO NOT drive, operate machinery, appliances, drink alcohol, make important decisions or sign legal documents.    Start with a light or bland diet if you are feeling sick to your stomach otherwise advance to regular diet as tolerated.    Follow recommendations on procedure report if provided by your doctor.    Call Dr Weller for problems [phone]    Problems may include but not limited to: large amounts of bleeding, trouble breathing, repeated vomiting, severe unrelieved pain, fever or chills.      Esophageal Stricture    Esophageal stricture is a narrowing of the esophagus. The esophagus is the part of the body that moves food and liquid from your mouth to your stomach. The esophagus can become narrow because of disease or damage to the area. This condition can make swallowing difficult, painful, or even impossible. It also makes choking more likely.  What are the causes?  The most common cause of this condition is gastroesophageal reflux disease (GERD). Normally, food travels down the esophagus and stays in the stomach to be digested. In GERD, food and stomach acid move back up into the esophagus. Over time, this causes scar tissue and leads to narrowing.  Other causes of esophageal stricture include:  · Scarring from swallowing a harmful substance.  · Damage from medical instruments used in the esophagus.  · Radiation therapy.  · Cancer.  · Inflammation of the esophagus.  What increases the risk?  You are more likely to develop an esophageal stricture if you have GERD or esophageal cancer.  What are the signs or symptoms?  Symptoms of this condition include:  · Difficulty swallowing.  · Pain when swallowing.  · Burning pain or discomfort in the throat or chest (heartburn).  · Vomiting or spitting up food or liquids.  · Unexplained weight loss.  How is this diagnosed?  This condition may be diagnosed based on:  · Your symptoms  and a physical exam.  · Tests, such as:  ? Upper endoscopy. Your health care provider will insert a flexible tube with a tiny camera on it (endoscope) into your esophagus to check for a stricture. A tissue sample may also be taken to be examined under a microscope (biopsy).  ? Esophageal pH monitoring. This test involves using a tube to collect acid in the esophagus to determine how much stomach acid is entering the esophagus.  ? Barium swallow test. For this test, you will drink a chalky liquid (barium solution) that coats the lining of the esophagus. Then you will have an X-ray taken. The barium solution helps to show if there is a stricture.  How is this treated?  Treatment for esophageal stricture depends on what is causing your condition and how severe your condition is. Treatment options include:  · Esophageal dilation. In this procedure, a health care provider inserts an endoscope or a tool called a dilator into the esophagus to gently stretch it and make the opening wider.  · Stents. In some cases, a health care provider may place a small device (stent) in the esophagus to keep it open.  · Acid-blocking medicines. Taking these can help you manage GERD symptoms after an esophageal stricture. Controlling your GERD symptoms or being free of them can prevent the stricture from returning.  Follow these instructions at home:  Eating and drinking  · Follow instructions from your health care provider about eating or drinking restrictions.  · Cut your food into small pieces, chew well, and eat slowly.  · Try to eat soft food that is easier to swallow.  · Eat and drink only when you are sitting upright.  · Do not drink alcohol. If you need help quitting, ask your health care provider.  · Do not eat during the 3 hours before bedtime.  · Do not overeat at meals.  · Do not eat foods that can make reflux worse. These include:  ? Fatty foods, such as red meat and processed foods.  ? Spicy foods.  ? Soda.  ? Tomato  products.  ? Chocolate.  General instructions  · Take over-the-counter and prescription medicines only as told by your health care provider.  · Do not use any products that contain nicotine or tobacco, such as cigarettes, e-cigarettes, and chewing tobacco. If you need help quitting, ask your health care provider.  · Lose weight if you are overweight.  · Wear loose, comfortable clothing.  · When lying in bed, raise your head with pillows. This will help to prevent your stomach contents from backing up into your esophagus while you sleep.  · Keep all follow-up visits. This is important.  Contact a health care provider if:  · You have problems eating or swallowing.  · You vomit or spit up food and liquid.  · Your symptoms do not improve with treatment.  Get help right away if:  · You can no longer keep down any food, drink, or your saliva.  Summary  · Esophageal stricture is a narrowing of the part of the body that moves food and liquid from your mouth to your stomach (esophagus).  · The esophagus can become narrow because of disease or damage to the area. This can make swallowing difficult, painful, or even impossible.  · Treatment for esophageal stricture depends on what is causing your condition and how severe your condition is. In some cases, procedures may be done to make the opening of the esophagus wider or to place a stent in the esophagus to keep it open.  · Do not drink alcohol, overeat at meals, or eat foods that can make reflux worse.  This information is not intended to replace advice given to you by your health care provider. Make sure you discuss any questions you have with your health care provider.  Document Revised: 05/05/2021 Document Reviewed: 05/05/2021  [employer] Patient Education © 2021 [employer]  Hiatal Hernia    A hiatal hernia occurs when part of the stomach slides above the muscle that separates the abdomen from the chest (diaphragm). A person can be born with a hiatal hernia (congenital),  or it may develop over time. In almost all cases of hiatal hernia, only the top part of the stomach pushes through the diaphragm.  Many people have a hiatal hernia with no symptoms. The larger the hernia, the more likely it is that you will have symptoms. In some cases, a hiatal hernia allows stomach acid to flow back into the tube that carries food from your mouth to your stomach (esophagus). This may cause heartburn symptoms. Severe heartburn symptoms may mean that you have developed a condition called gastroesophageal reflux disease (GERD).  What are the causes?  This condition is caused by a weakness in the opening (hiatus) where the esophagus passes through the diaphragm to attach to the upper part of the stomach. A person may be born with a weakness in the hiatus, or a weakness can develop over time.  What increases the risk?  This condition is more likely to develop in:  · Older people. Age is a major risk factor for a hiatal hernia, especially if you are over the age of 50.  · Pregnant women.  · People who are overweight.  · People who have frequent constipation.  What are the signs or symptoms?  Symptoms of this condition usually develop in the form of GERD symptoms. Symptoms include:  · Heartburn.  · Belching.  · Indigestion.  · Trouble swallowing.  · Coughing or wheezing.  · Sore throat.  · Hoarseness.  · Chest pain.  · Nausea and vomiting.  How is this diagnosed?  This condition may be diagnosed during testing for GERD. Tests that may be done include:  · X-rays of your stomach or chest.  · An upper gastrointestinal (GI) series. This is an X-ray exam of your GI tract that is taken after you swallow a chalky liquid that shows up clearly on the X-ray.  · Endoscopy. This is a procedure to look into your stomach using a thin, flexible tube that has a tiny camera and light on the end of it.  How is this treated?  This condition may be treated by:  · Dietary and lifestyle changes to help reduce GERD  symptoms.  · Medicines. These may include:  ? Over-the-counter antacids.  ? Medicines that make your stomach empty more quickly.  ? Medicines that block the production of stomach acid (H2 blockers).  ? Stronger medicines to reduce stomach acid (proton pump inhibitors).  · Surgery to repair the hernia, if other treatments are not helping.  If you have no symptoms, you may not need treatment.  Follow these instructions at home:  Lifestyle and activity  · Do not use any products that contain nicotine or tobacco, such as cigarettes and e-cigarettes. If you need help quitting, ask your health care provider.  · Try to achieve and maintain a healthy body weight.  · Avoid putting pressure on your abdomen. Anything that puts pressure on your abdomen increases the amount of acid that may be pushed up into your esophagus.  ? Avoid bending over, especially after eating.  ? Raise the head of your bed by putting blocks under the legs. This keeps your head and esophagus higher than your stomach.  ? Do not wear tight clothing around your chest or stomach.  ? Try not to strain when having a bowel movement, when urinating, or when lifting heavy objects.  Eating and drinking  · Avoid foods that can worsen GERD symptoms. These may include:  ? Fatty foods, like fried foods.  ? Citrus fruits, like oranges or lemon.  ? Other foods and drinks that contain acid, like orange juice or tomatoes.  ? Spicy food.  ? Chocolate.  · Eat frequent small meals instead of three large meals a day. This helps prevent your stomach from getting too full.  ? Eat slowly.  ? Do not lie down right after eating.  ? Do not eat 1-2 hours before bed.  · Do not drink beverages with caffeine. These include cola, coffee, cocoa, and tea.  · Do not drink alcohol.  General instructions  · Take over-the-counter and prescription medicines only as told by your health care provider.  · Keep all follow-up visits as told by your health care provider. This is  important.  Contact a health care provider if:  · Your symptoms are not controlled with medicines or lifestyle changes.  · You are having trouble swallowing.  · You have coughing or wheezing that will not go away.  Get help right away if:  · Your pain is getting worse.  · Your pain spreads to your arms, neck, jaw, teeth, or back.  · You have shortness of breath.  · You sweat for no reason.  · You feel sick to your stomach (nauseous) or you vomit.  · You vomit blood.  · You have bright red blood in your stools.  · You have black, tarry stools.  This information is not intended to replace advice given to you by your health care provider. Make sure you discuss any questions you have with your health care provider.  Document Revised: 11/30/2018 Document Reviewed: 07/23/2018  [employer] Patient Education © 2021 [employer]

## 2022-02-01 NOTE — ANESTHESIA POSTPROCEDURE EVALUATION
"Patient: Louis Villalba    Procedure Summary     Date: 02/01/22 Room / Location:  [facility]    Anesthesia Start: 1216 Anesthesia Stop: 1240    Procedure: ESOPHAGOGASTRODUODENOSCOPY WITH 8MM-12MM BALLOON DILATION AND BIOPSIES (N/A Esophagus) Diagnosis:       Dysphagia, unspecified type      Gastroesophageal reflux disease, unspecified whether esophagitis present      (Dysphagia, unspecified type [R13.10])      (Gastroesophageal reflux disease, unspecified whether esophagitis present [K21.9])    Surgeons: Skyler Weller MD Provider: Asael Archuleta MD    Anesthesia Type: MAC ASA Status: 3          Anesthesia Type: MAC    Vitals  Vitals Value Taken Time   /85 02/01/22 1251   Temp     Pulse 107 02/01/22 1251   Resp 16 02/01/22 1251   SpO2 94 % 02/01/22 1251           Post Anesthesia Care and Evaluation    Patient location during evaluation: PACU  Patient participation: complete - patient participated  Level of consciousness: awake  Pain score: 0  Pain management: adequate  Airway patency: patent  Anesthetic complications: No anesthetic complications  PONV Status: none  Cardiovascular status: acceptable  Respiratory status: acceptable  Hydration status: acceptable    Comments: /85 (BP Location: Left arm, Patient Position: Lying)   Pulse 107   Resp 16   Ht 162.6 cm (64\")   Wt 52.4 kg (115 lb 8 oz)   SpO2 94%   BMI 19.83 kg/m²       "

## 2022-02-01 NOTE — H&P
[facility]  Pre Procedure History & Physical    Chief Complaint:   Time for my egd     Subjective     HPI:   89 y.o. male dysphagia to solids    Past Medical History:   Past Medical History:   Diagnosis Date   • Anemia    • Arthritis    • Cancer (HCC)     skin: BASAL CELL on face, excised- remote   • Colon polyp    • History of transfusion    • Hyperlipidemia    • Macular degeneration        Family History:  Family History   Problem Relation Age of Onset   • No Known Problems Mother    • No Known Problems Father        Social History:   reports that he quit smoking about 12 years ago. His smoking use included cigarettes. He started smoking about 21 years ago. He has a 15.00 pack-year smoking history. He has never used smokeless tobacco. He reports current alcohol use of about 21.0 standard drinks of alcohol per week. He reports that he does not use drugs.    Medications:   Medications Prior to Admission   Medication Sig Dispense Refill Last Dose   • albuterol (PROVENTIL HFA;VENTOLIN HFA) 108 (90 Base) MCG/ACT inhaler Inhale 2 puffs Every 4 (Four) Hours As Needed for Wheezing. 2 inhaler 0 2/1/2022 at Unknown time   • albuterol (PROVENTIL) (2.5 MG/3ML) 0.083% nebulizer solution Take 2.5 mg by nebulization 4 (Four) Times a Day As Needed for Wheezing. 125 vial 2 2/1/2022 at Unknown time   • atorvastatin (LIPITOR) 10 MG tablet TAKE 1 TABLET BY MOUTH EVERY DAY 90 tablet 1 1/31/2022 at Unknown time   • brimonidine-timolol (COMBIGAN) 0.2-0.5 % ophthalmic solution Administer 1 drop to both eyes Every 12 (Twelve) Hours.   2/1/2022 at Unknown time   • desonide (DESOWEN) 0.05 % cream APPLY TO LEFT LOWER LID BID   1/31/2022 at Unknown time   • docusate sodium (COLACE) 100 MG capsule Take 100 mg by mouth 2 (Two) Times a Day.   1/31/2022 at Unknown time   • Incruse Ellipta 62.5 MCG/INH aerosol powder  INL 1 PUFF PO D   2/1/2022 at Unknown time   • meclizine (ANTIVERT) 25 MG tablet Take 1 tablet by mouth 3  "(Three) Times a Day As Needed for Dizziness. 60 tablet 1 Past Week at Unknown time   • Multiple Vitamins-Minerals (VITEYES AREDS FORMULA PO) Take 2 tablets by mouth Daily.   Past Month at Unknown time   • ofloxacin (OCUFLOX) 0.3 % ophthalmic solution    2/1/2022 at Unknown time   • pantoprazole (PROTONIX) 40 MG EC tablet Take 1 tablet by mouth 2 (Two) Times a Day. 180 tablet 3 1/31/2022 at Unknown time   • tamsulosin (FLOMAX) 0.4 MG capsule 24 hr capsule TAKE 1 CAPSULE BY MOUTH EVERY DAY 90 capsule 1 1/31/2022 at Unknown time   • Tiotropium Bromide Monohydrate (SPIRIVA RESPIMAT) 2.5 MCG/ACT aerosol solution Inhale 5 mg Daily. 1 inhaler 2 2/1/2022 at Unknown time   • acetaminophen (TYLENOL) 325 MG tablet Take 2 tablets by mouth Every 6 (Six) Hours As Needed for Mild Pain .   More than a month at Unknown time   • Aspirin Buf,CaCarb-MgCarb-MgO, 81 MG tablet Take 81 mg by mouth Daily.      • benzonatate (TESSALON) 100 MG capsule Take 100 mg by mouth.   More than a month at Unknown time       Allergies:  Polymyxin b-trimethoprim and Sulfa antibiotics    ROS:    Pertinent items are noted in HPI     Objective     Blood pressure 159/78, pulse 88, resp. rate 16, height 162.6 cm (64\"), weight 52.4 kg (115 lb 8 oz), SpO2 96 %.    Physical Exam   Constitutional: Pt is oriented to person, place, and time and well-developed, well-nourished, and in no distress.   Abdominal: Soft.   Psychiatric: Mood, memory, affect and judgment normal.     Assessment/Plan     Diagnosis: dysphagia    Anticipated Surgical Procedure:  egd with dilation    The risks, benefits, and alternatives of this procedure have been discussed with the patient or the responsible party- the patient understands and agrees to proceed.                                                                "

## 2022-02-02 LAB
LAB AP CASE REPORT: NORMAL
PATH REPORT.FINAL DX SPEC: NORMAL
PATH REPORT.GROSS SPEC: NORMAL

## 2022-02-08 ENCOUNTER — TELEPHONE (OUTPATIENT)
Dept: GASTROENTEROLOGY | Facility: CLINIC | Age: 87
End: 2022-02-08

## 2022-02-08 ENCOUNTER — PREP FOR SURGERY (OUTPATIENT)
Dept: OTHER | Facility: HOSPITAL | Age: 87
End: 2022-02-08

## 2022-02-08 DIAGNOSIS — K21.9 GASTROESOPHAGEAL REFLUX DISEASE, UNSPECIFIED WHETHER ESOPHAGITIS PRESENT: Primary | ICD-10-CM

## 2022-02-08 DIAGNOSIS — R13.19 OTHER DYSPHAGIA: ICD-10-CM

## 2022-02-08 NOTE — TELEPHONE ENCOUNTER
Called pt and advised of Dr. Weller's note.  Pt verbalized understanding.     Msg sent to Shelley TIWARI to schedule EGD.

## 2022-02-08 NOTE — TELEPHONE ENCOUNTER
----- Message from Skyler Weller MD sent at 2/8/2022 12:29 PM EST -----  Pathology is benignContinue twice daily PPISchedule EGD in 6 weeks, case request is in

## 2022-02-15 ENCOUNTER — TELEPHONE (OUTPATIENT)
Dept: GASTROENTEROLOGY | Facility: CLINIC | Age: 87
End: 2022-02-15

## 2022-02-15 NOTE — TELEPHONE ENCOUNTER
[facility] for EGD on 05/12/2022  arrive at 12pm  . Mailed Prep instructions to Mailing address on-file.         Advised PT  that [facility] will call with final arrival time  24 hrs before procedure. If they do not get a phone call, arrival time will stay the same as given on instructions

## 2022-02-24 ENCOUNTER — OFFICE VISIT (OUTPATIENT)
Dept: FAMILY MEDICINE CLINIC | Facility: CLINIC | Age: 87
End: 2022-02-24

## 2022-02-24 VITALS
DIASTOLIC BLOOD PRESSURE: 68 MMHG | OXYGEN SATURATION: 99 % | TEMPERATURE: 97.3 F | HEART RATE: 82 BPM | WEIGHT: 115.2 LBS | HEIGHT: 64 IN | BODY MASS INDEX: 19.67 KG/M2 | SYSTOLIC BLOOD PRESSURE: 132 MMHG

## 2022-02-24 DIAGNOSIS — H81.10 BENIGN PAROXYSMAL POSITIONAL VERTIGO, UNSPECIFIED LATERALITY: ICD-10-CM

## 2022-02-24 PROCEDURE — 99213 OFFICE O/P EST LOW 20 MIN: CPT | Performed by: INTERNAL MEDICINE

## 2022-02-24 RX ORDER — MECLIZINE HYDROCHLORIDE 25 MG/1
25 TABLET ORAL 3 TIMES DAILY PRN
Qty: 60 TABLET | Refills: 1 | Status: SHIPPED | OUTPATIENT
Start: 2022-02-24 | End: 2022-05-16

## 2022-02-24 NOTE — PROGRESS NOTES
Subjective   Louis Villalba is a 89 y.o. male.     Chief Complaint   Patient presents with   • Difficulty Swallowing   • Dizziness       History of Present Illness   Patient was seen by Dr Weller 2/1/22 with EGD and esophageal dilation and has repeat scheduled 5/12/22.    For the last 8-9 day with dizzy.  Has had several times in the past and uses meclizine as needed but is out.  Has been doing the home Epley manuevers that does help some.  Needs meclizine.    The following portions of the patient's history were reviewed and updated as appropriate: allergies, current medications, past family history, past medical history, past social history, past surgical history and problem list.    Depression Screen:  PHQ-2/PHQ-9 Depression Screening 4/26/2021   Little interest or pleasure in doing things 0   Feeling down, depressed, or hopeless 0   Trouble falling or staying asleep, or sleeping too much 0   Feeling tired or having little energy 0   Poor appetite or overeating 0   Feeling bad about yourself - or that you are a failure or have let yourself or your family down 0   Trouble concentrating on things, such as reading the newspaper or watching television 0   Moving or speaking so slowly that other people could have noticed. Or the opposite - being so fidgety or restless that you have been moving around a lot more than usual 0   Thoughts that you would be better off dead, or of hurting yourself in some way 0   Total Score 0       Past Medical History:   Diagnosis Date   • Anemia    • Arthritis    • Cancer (HCC)     skin: BASAL CELL on face, excised- remote   • Colon polyp    • History of transfusion    • Hyperlipidemia    • Macular degeneration        Past Surgical History:   Procedure Laterality Date   • APPENDECTOMY  1958   • COLONOSCOPY  2013   • ENDOSCOPY N/A 10/5/2016    Procedure: ESOPHAGOGASTRODUODENOSCOPY with biopsy;  Surgeon: Edward Gan MD;  Location: [facility] ENDOSCOPY;  Service:    • ENDOSCOPY N/A  2022    Procedure: ESOPHAGOGASTRODUODENOSCOPY WITH 8MM-12MM BALLOON DILATION AND BIOPSIES;  Surgeon: Skyler Weller MD;  Location: [facility] ENDOSCOPY;  Service: Gastroenterology;  Laterality: N/A;  PRE- DYSPHAGIA  POST-  HIATAL HERNIA, ESOPHAGEAL STRICTURE   • EYE SURGERY      cataract b/l surgery   • SKIN BIOPSY         Family History   Problem Relation Age of Onset   • No Known Problems Mother    • No Known Problems Father        Social History     Socioeconomic History   • Marital status:    Tobacco Use   • Smoking status: Former Smoker     Packs/day: 1.50     Years: 10.00     Pack years: 15.00     Types: Cigarettes     Start date: 2000     Quit date:      Years since quittin.1   • Smokeless tobacco: Never Used   Substance and Sexual Activity   • Alcohol use: Yes     Alcohol/week: 21.0 standard drinks     Types: 21 Cans of beer per week     Comment: drinks 2 beers daily   • Drug use: No   • Sexual activity: Defer       Current Outpatient Medications   Medication Sig Dispense Refill   • acetaminophen (TYLENOL) 325 MG tablet Take 2 tablets by mouth Every 6 (Six) Hours As Needed for Mild Pain .     • albuterol (PROVENTIL HFA;VENTOLIN HFA) 108 (90 Base) MCG/ACT inhaler Inhale 2 puffs Every 4 (Four) Hours As Needed for Wheezing. 2 inhaler 0   • albuterol (PROVENTIL) (2.5 MG/3ML) 0.083% nebulizer solution Take 2.5 mg by nebulization 4 (Four) Times a Day As Needed for Wheezing. 125 vial 2   • Aspirin Buf,CaCarb-MgCarb-MgO, 81 MG tablet Take 81 mg by mouth Daily.     • atorvastatin (LIPITOR) 10 MG tablet TAKE 1 TABLET BY MOUTH EVERY DAY 90 tablet 1   • brimonidine-timolol (COMBIGAN) 0.2-0.5 % ophthalmic solution Administer 1 drop to both eyes Every 12 (Twelve) Hours.     • desonide (DESOWEN) 0.05 % cream APPLY TO LEFT LOWER LID BID     • docusate sodium (COLACE) 100 MG capsule Take 100 mg by mouth 2 (Two) Times a Day.     • Incruse Ellipta 62.5 MCG/INH aerosol powder  INL 1 PUFF PO D     •  meclizine (ANTIVERT) 25 MG tablet Take 1 tablet by mouth 3 (Three) Times a Day As Needed for Dizziness. 60 tablet 1   • Multiple Vitamins-Minerals (VITEYES AREDS FORMULA PO) Take 2 tablets by mouth Daily.     • ofloxacin (OCUFLOX) 0.3 % ophthalmic solution      • pantoprazole (PROTONIX) 40 MG EC tablet Take 1 tablet by mouth 2 (Two) Times a Day. 180 tablet 3   • sucralfate (Carafate) 1 GM/10ML suspension Take 10 mL by mouth 2 (Two) Times a Day. 600 mL 1   • tamsulosin (FLOMAX) 0.4 MG capsule 24 hr capsule TAKE 1 CAPSULE BY MOUTH EVERY DAY 90 capsule 1   • Tiotropium Bromide Monohydrate (SPIRIVA RESPIMAT) 2.5 MCG/ACT aerosol solution Inhale 5 mg Daily. 1 inhaler 2     No current facility-administered medications for this visit.       Review of Systems   Constitutional: Negative for activity change, appetite change, fatigue, fever, unexpected weight gain and unexpected weight loss.   HENT: Negative for nosebleeds, rhinorrhea, trouble swallowing and voice change.    Eyes: Negative for visual disturbance.   Respiratory: Negative for cough, chest tightness, shortness of breath and wheezing.    Cardiovascular: Negative for chest pain, palpitations and leg swelling.   Gastrointestinal: Negative for abdominal pain, blood in stool, constipation, diarrhea, nausea, vomiting, GERD and indigestion.        Some swallowing difficulties but improved since his most recent dilation.   Genitourinary: Negative for dysuria, frequency and hematuria.   Musculoskeletal: Negative for arthralgias, back pain and myalgias.   Skin: Negative for rash and wound.   Neurological: Positive for dizziness. Negative for tremors, weakness, light-headedness, numbness, headache and memory problem.   Hematological: Negative for adenopathy. Does not bruise/bleed easily.   Psychiatric/Behavioral: Negative for sleep disturbance and depressed mood. The patient is not nervous/anxious.        Objective   /68 (BP Location: Left arm, Patient Position:  "Sitting, Cuff Size: Adult)   Pulse 82   Temp 97.3 °F (36.3 °C) (Temporal)   Ht 162.6 cm (64.02\")   Wt 52.3 kg (115 lb 3.2 oz)   SpO2 99%   BMI 19.76 kg/m²     Physical Exam  Vitals and nursing note reviewed.   Constitutional:       General: He is not in acute distress.     Appearance: He is well-developed. He is not diaphoretic.   HENT:      Head: Normocephalic and atraumatic.      Right Ear: External ear normal.      Left Ear: External ear normal.      Nose: Nose normal.   Eyes:      Conjunctiva/sclera: Conjunctivae normal.      Pupils: Pupils are equal, round, and reactive to light.   Neck:      Thyroid: No thyromegaly.      Trachea: No tracheal deviation.   Cardiovascular:      Rate and Rhythm: Normal rate and regular rhythm.      Heart sounds: Normal heart sounds. No murmur heard.  No friction rub. No gallop.    Pulmonary:      Effort: Pulmonary effort is normal. No respiratory distress.      Breath sounds: Normal breath sounds.   Abdominal:      General: Bowel sounds are normal.      Palpations: Abdomen is soft. There is no mass.      Tenderness: There is no abdominal tenderness. There is no guarding.   Musculoskeletal:         General: Normal range of motion.      Cervical back: Normal range of motion and neck supple.   Lymphadenopathy:      Cervical: No cervical adenopathy.   Skin:     General: Skin is warm and dry.      Capillary Refill: Capillary refill takes less than 2 seconds.      Findings: No rash.   Neurological:      Mental Status: He is alert and oriented to person, place, and time.      Motor: No abnormal muscle tone.      Deep Tendon Reflexes: Reflexes normal.   Psychiatric:         Behavior: Behavior normal.         Thought Content: Thought content normal.         Judgment: Judgment normal.         Recent Results (from the past 2016 hour(s))   COVID-19,APTIMA PANTHER(MADDI),[facility], NP/OP SWAB IN UTM/VTM/[facility] TRANSPORT MEDIA,24 HR TAT - Swab, Nasopharynx    Collection Time: 01/29/22  " 9:10 AM    Specimen: Nasopharynx; Swab   Result Value Ref Range    COVID19 Not Detected Not Detected - Ref. Range   Tissue Pathology Exam    Collection Time: 02/01/22 12:28 PM    Specimen: Esophagus; Tissue   Result Value Ref Range    Case Report       Surgical Pathology Report                         Case: [id number]                                  Authorizing Provider:  Skyler Weller MD        Collected:           02/01/2022 12:28 PM          Ordering Location:     [facility]  Received:            02/01/2022 01:36 PM                                 ENDO SUITES                                                                  Pathologist:           Deneen Allan MD                                                          Specimen:    Esophagus                                                                                  Final Diagnosis       1. Esophagus, Biopsy:  Squamoglandular mucosa and submucosa with   A. No definitive well-developed goblet cell metaplasia identified by routine staining.   B. Mild mixed but predominantly chronic inflammation and repair; negative for dysplasia and malignancy.   C. No significant eosinophilia.   D. No viral inclusions nor fungal organisms identified.    [facility]/s      Gross Description       1. The specimen is received in formalin labeled with the patient's name and further designated 'esophagus biopsy' are multiple small fragments of gray-tan tissue. The specimen is submitted for embedding as received.         Assessment/Plan   Diagnoses and all orders for this visit:    1. Benign paroxysmal positional vertigo, unspecified laterality  -     meclizine (ANTIVERT) 25 MG tablet; Take 1 tablet by mouth 3 (Three) Times a Day As Needed for Dizziness.  Dispense: 60 tablet; Refill: 1    Continue meclizine as needed and he is to continue with his home Epley maneuvers at this time.  Follow-up with Dr. Jennifer YOUSIF for repeat esophageal dilation as scheduled.  Continue  all other medications unchanged at this time.  If he has worsening problems is a follow-up or contact us.           · COVID-19 Precautions - Patient was compliant in wearing a mask. When I saw the patient, I used appropriate personal protective equipment (PPE) including mask and eye shield (standard procedure).  Additionally, I used gown and gloves if indicated.  Hand hygiene was completed before and after seeing the patient.  · Dictated utilizing Dragon Dictation

## 2022-02-25 ENCOUNTER — TELEPHONE (OUTPATIENT)
Dept: FAMILY MEDICINE CLINIC | Facility: CLINIC | Age: 87
End: 2022-02-25

## 2022-03-15 DIAGNOSIS — N40.0 BENIGN PROSTATIC HYPERPLASIA WITHOUT LOWER URINARY TRACT SYMPTOMS: ICD-10-CM

## 2022-03-15 DIAGNOSIS — E78.2 MIXED HYPERLIPIDEMIA: ICD-10-CM

## 2022-03-15 RX ORDER — ATORVASTATIN CALCIUM 10 MG/1
TABLET, FILM COATED ORAL
Qty: 90 TABLET | Refills: 1 | Status: SHIPPED | OUTPATIENT
Start: 2022-03-15 | End: 2022-09-12

## 2022-03-15 RX ORDER — TAMSULOSIN HYDROCHLORIDE 0.4 MG/1
CAPSULE ORAL
Qty: 90 CAPSULE | Refills: 1 | Status: SHIPPED | OUTPATIENT
Start: 2022-03-15 | End: 2022-09-12

## 2022-03-15 NOTE — TELEPHONE ENCOUNTER
Rx Refill Note  Requested Prescriptions     Pending Prescriptions Disp Refills   • tamsulosin (FLOMAX) 0.4 MG capsule 24 hr capsule [Pharmacy Med Name: TAMSULOSIN 0.4MG CAPSULES] 90 capsule 1     Sig: TAKE 1 CAPSULE BY MOUTH EVERY DAY   • atorvastatin (LIPITOR) 10 MG tablet [Pharmacy Med Name: ATORVASTATIN 10MG TABLETS] 90 tablet 1     Sig: TAKE 1 TABLET BY MOUTH EVERY DAY      Last office visit with prescribing clinician: 2/24/2022      Next office visit with prescribing clinician: due 6/24/22    Cinthya Infante MA  03/15/22, 14:07 EDT

## 2022-03-30 RX ORDER — SUCRALFATE ORAL 1 G/10ML
SUSPENSION ORAL
Qty: 600 ML | Refills: 1 | Status: SHIPPED | OUTPATIENT
Start: 2022-03-30

## 2022-05-04 ENCOUNTER — TRANSCRIBE ORDERS (OUTPATIENT)
Dept: ADMINISTRATIVE | Facility: HOSPITAL | Age: 87
End: 2022-05-04

## 2022-05-04 DIAGNOSIS — Z01.818 OTHER SPECIFIED PRE-OPERATIVE EXAMINATION: Primary | ICD-10-CM

## 2022-05-05 ENCOUNTER — TELEPHONE (OUTPATIENT)
Dept: GASTROENTEROLOGY | Facility: CLINIC | Age: 87
End: 2022-05-05

## 2022-05-05 DIAGNOSIS — Z01.818 PRE-OP TESTING: Primary | ICD-10-CM

## 2022-05-10 ENCOUNTER — LAB (OUTPATIENT)
Dept: LAB | Facility: HOSPITAL | Age: 87
End: 2022-05-10

## 2022-05-10 DIAGNOSIS — Z01.818 OTHER SPECIFIED PRE-OPERATIVE EXAMINATION: ICD-10-CM

## 2022-05-10 LAB — SARS-COV-2 ORF1AB RESP QL NAA+PROBE: NOT DETECTED

## 2022-05-10 PROCEDURE — C9803 HOPD COVID-19 SPEC COLLECT: HCPCS

## 2022-05-10 PROCEDURE — U0004 COV-19 TEST NON-CDC HGH THRU: HCPCS

## 2022-05-12 ENCOUNTER — HOSPITAL ENCOUNTER (OUTPATIENT)
Facility: HOSPITAL | Age: 87
Setting detail: HOSPITAL OUTPATIENT SURGERY
Discharge: HOME | End: 2022-05-12
Attending: INTERNAL MEDICINE | Admitting: INTERNAL MEDICINE

## 2022-05-12 ENCOUNTER — ANESTHESIA EVENT (OUTPATIENT)
Dept: GASTROENTEROLOGY | Facility: HOSPITAL | Age: 87
End: 2022-05-12

## 2022-05-12 ENCOUNTER — ANESTHESIA (OUTPATIENT)
Dept: GASTROENTEROLOGY | Facility: HOSPITAL | Age: 87
End: 2022-05-12

## 2022-05-12 VITALS
DIASTOLIC BLOOD PRESSURE: 86 MMHG | BODY MASS INDEX: 19.76 KG/M2 | OXYGEN SATURATION: 99 % | SYSTOLIC BLOOD PRESSURE: 140 MMHG | HEIGHT: 64 IN | HEART RATE: 83 BPM | RESPIRATION RATE: 20 BRPM

## 2022-05-12 PROCEDURE — S0260 H&P FOR SURGERY: HCPCS | Performed by: INTERNAL MEDICINE

## 2022-05-12 PROCEDURE — C1726 CATH, BAL DIL, NON-VASCULAR: HCPCS | Performed by: INTERNAL MEDICINE

## 2022-05-12 PROCEDURE — 43249 ESOPH EGD DILATION <30 MM: CPT | Performed by: INTERNAL MEDICINE

## 2022-05-12 PROCEDURE — 25010000002 PROPOFOL 10 MG/ML EMULSION: Performed by: ANESTHESIOLOGY

## 2022-05-12 PROCEDURE — 43245 EGD DILATE STRICTURE: CPT | Performed by: INTERNAL MEDICINE

## 2022-05-12 RX ORDER — SODIUM CHLORIDE 0.9 % (FLUSH) 0.9 %
10 SYRINGE (ML) INJECTION EVERY 12 HOURS SCHEDULED
Status: DISCONTINUED | OUTPATIENT
Start: 2022-05-12 | End: 2022-05-12 | Stop reason: HOSPADM

## 2022-05-12 RX ORDER — SODIUM CHLORIDE, SODIUM LACTATE, POTASSIUM CHLORIDE, CALCIUM CHLORIDE 600; 310; 30; 20 MG/100ML; MG/100ML; MG/100ML; MG/100ML
30 INJECTION, SOLUTION INTRAVENOUS CONTINUOUS PRN
Status: DISCONTINUED | OUTPATIENT
Start: 2022-05-12 | End: 2022-05-12 | Stop reason: HOSPADM

## 2022-05-12 RX ORDER — LIDOCAINE HYDROCHLORIDE 20 MG/ML
INJECTION, SOLUTION INFILTRATION; PERINEURAL AS NEEDED
Status: DISCONTINUED | OUTPATIENT
Start: 2022-05-12 | End: 2022-05-12 | Stop reason: SURG

## 2022-05-12 RX ORDER — SODIUM CHLORIDE 0.9 % (FLUSH) 0.9 %
10 SYRINGE (ML) INJECTION AS NEEDED
Status: DISCONTINUED | OUTPATIENT
Start: 2022-05-12 | End: 2022-05-12 | Stop reason: HOSPADM

## 2022-05-12 RX ORDER — PROPOFOL 10 MG/ML
VIAL (ML) INTRAVENOUS AS NEEDED
Status: DISCONTINUED | OUTPATIENT
Start: 2022-05-12 | End: 2022-05-12 | Stop reason: SURG

## 2022-05-12 RX ADMIN — PROPOFOL 200 MG: 10 INJECTION, EMULSION INTRAVENOUS at 11:23

## 2022-05-12 RX ADMIN — SODIUM CHLORIDE, POTASSIUM CHLORIDE, SODIUM LACTATE AND CALCIUM CHLORIDE 30 ML/HR: 600; 310; 30; 20 INJECTION, SOLUTION INTRAVENOUS at 11:14

## 2022-05-12 RX ADMIN — LIDOCAINE HYDROCHLORIDE 100 MG: 20 INJECTION, SOLUTION INFILTRATION; PERINEURAL at 11:23

## 2022-05-12 NOTE — H&P
[facility]  Pre Procedure History & Physical    Chief Complaint:   Time for my egd    Subjective     HPI:   89 y.o. male     Past Medical History:   Past Medical History:   Diagnosis Date   • Anemia    • Arthritis    • Cancer (HCC)     skin: BASAL CELL on face, excised- remote   • Colon polyp    • History of COVID-19     2020   • History of transfusion    • Hyperlipidemia    • Macular degeneration    • Urinary retention     post-op EGD on 2/1/2022       Family History:  Family History   Problem Relation Age of Onset   • No Known Problems Mother    • No Known Problems Father    • Malig Hyperthermia Neg Hx        Social History:   reports that he quit smoking about 12 years ago. His smoking use included cigarettes. He started smoking about 21 years ago. He has a 15.00 pack-year smoking history. He has never used smokeless tobacco. He reports current alcohol use of about 21.0 standard drinks of alcohol per week. He reports that he does not use drugs.    Medications:   Medications Prior to Admission   Medication Sig Dispense Refill Last Dose   • acetaminophen (TYLENOL) 325 MG tablet Take 2 tablets by mouth Every 6 (Six) Hours As Needed for Mild Pain .      • albuterol (PROVENTIL HFA;VENTOLIN HFA) 108 (90 Base) MCG/ACT inhaler Inhale 2 puffs Every 4 (Four) Hours As Needed for Wheezing. 2 inhaler 0    • albuterol (PROVENTIL) (2.5 MG/3ML) 0.083% nebulizer solution Take 2.5 mg by nebulization 4 (Four) Times a Day As Needed for Wheezing. 125 vial 2 5/12/2022 at Unknown time   • atorvastatin (LIPITOR) 10 MG tablet TAKE 1 TABLET BY MOUTH EVERY DAY 90 tablet 1 5/11/2022 at Unknown time   • brimonidine-timolol (COMBIGAN) 0.2-0.5 % ophthalmic solution Administer 1 drop to both eyes Every 12 (Twelve) Hours.   5/12/2022 at Unknown time   • desonide (DESOWEN) 0.05 % cream APPLY TO LEFT LOWER LID BID      • docusate sodium (COLACE) 100 MG capsule Take 100 mg by mouth 2 (Two) Times a Day.   5/11/2022 at  "Unknown time   • Incruse Ellipta 62.5 MCG/INH aerosol powder  INL 1 PUFF PO D   5/11/2022 at Unknown time   • meclizine (ANTIVERT) 25 MG tablet Take 1 tablet by mouth 3 (Three) Times a Day As Needed for Dizziness. 60 tablet 1 5/12/2022 at Unknown time   • Multiple Vitamins-Minerals (VITEYES AREDS FORMULA PO) Take 2 tablets by mouth Daily.   5/11/2022 at Unknown time   • pantoprazole (PROTONIX) 40 MG EC tablet Take 1 tablet by mouth 2 (Two) Times a Day. 180 tablet 3 5/12/2022 at Unknown time   • sucralfate (CARAFATE) 1 GM/10ML suspension SHAKE LIQUID AND TAKE 10 ML BY MOUTH TWICE DAILY 600 mL 1 Past Week at Unknown time   • tamsulosin (FLOMAX) 0.4 MG capsule 24 hr capsule TAKE 1 CAPSULE BY MOUTH EVERY DAY 90 capsule 1 5/11/2022 at Unknown time   • Tiotropium Bromide Monohydrate (SPIRIVA RESPIMAT) 2.5 MCG/ACT aerosol solution Inhale 5 mg Daily. 1 inhaler 2 5/11/2022 at Unknown time   • Aspirin Buf,CaCarb-MgCarb-MgO, 81 MG tablet Take 81 mg by mouth Daily.      • ofloxacin (OCUFLOX) 0.3 % ophthalmic solution           Allergies:  Polymyxin b-trimethoprim and Sulfa antibiotics    ROS:    Pertinent items are noted in HPI     Objective     Blood pressure 138/78, pulse 84, resp. rate 16, height 162.6 cm (64.02\"), SpO2 97 %.    Physical Exam   Constitutional: Pt is oriented to person, place, and time and well-developed, well-nourished, and in no distress.   Abdominal: Soft.   Psychiatric: Mood, memory, affect and judgment normal.     Assessment & Plan     Diagnosis:  dysphagia    Anticipated Surgical Procedure:  egd    The risks, benefits, and alternatives of this procedure have been discussed with the patient or the responsible party- the patient understands and agrees to proceed.                                                                "

## 2022-05-12 NOTE — ANESTHESIA POSTPROCEDURE EVALUATION
"Patient: Louis Villalba    Procedure Summary     Date: 05/12/22 Room / Location:  [facility] /  [facility]    Anesthesia Start: 1116 Anesthesia Stop: 1145    Procedure: ESOPHAGOGASTRODUODENOSCOPY WITH 15MM BALLOON PYLORIC DILATATION AND 12-18MM ESOPHAGEAL DILATATION (N/A Esophagus) Diagnosis:       Gastroesophageal reflux disease, unspecified whether esophagitis present      Other dysphagia      (Gastroesophageal reflux disease, unspecified whether esophagitis present [K21.9])      (Other dysphagia [R13.19])    Surgeons: Skyler Weller MD Provider: Asael Goel MD    Anesthesia Type: MAC ASA Status: 3          Anesthesia Type: MAC    Vitals  Vitals Value Taken Time   /86 05/12/22 1203   Temp     Pulse 83 05/12/22 1203   Resp 20 05/12/22 1203   SpO2 99 % 05/12/22 1203           Post Anesthesia Care and Evaluation    Patient location during evaluation: bedside  Patient participation: complete - patient participated  Level of consciousness: awake and alert  Pain management: adequate  Airway patency: patent  Anesthetic complications: No anesthetic complications    Cardiovascular status: acceptable  Respiratory status: acceptable  Hydration status: acceptable    Comments: /86   Pulse 83   Resp 20   Ht 162.6 cm (64.02\")   SpO2 99%   BMI 19.76 kg/m²       "

## 2022-05-12 NOTE — ANESTHESIA PREPROCEDURE EVALUATION
Anesthesia Evaluation     NPO Solid Status: > 8 hours             Airway   Mallampati: II  TM distance: >3 FB  Neck ROM: full  no difficulty expected  Dental - normal exam     Pulmonary - normal exam   (+) COPD, shortness of breath, recent URI,   Cardiovascular - normal exam    (+) hypertension, hyperlipidemia,       Neuro/Psych  (+) dizziness/light headedness,    GI/Hepatic/Renal/Endo    (+)  GERD,  renal disease CRI,     Musculoskeletal     Abdominal  - normal exam   Substance History      OB/GYN          Other   arthritis,    history of cancer                    Anesthesia Plan    ASA 3     MAC       Anesthetic plan, all risks, benefits, and alternatives have been provided, discussed and informed consent has been obtained with: patient.        CODE STATUS:

## 2022-05-16 DIAGNOSIS — H81.10 BENIGN PAROXYSMAL POSITIONAL VERTIGO, UNSPECIFIED LATERALITY: ICD-10-CM

## 2022-05-16 RX ORDER — MECLIZINE HYDROCHLORIDE 25 MG/1
TABLET ORAL
Qty: 60 TABLET | Refills: 1 | Status: SHIPPED | OUTPATIENT
Start: 2022-05-16 | End: 2022-09-29 | Stop reason: SDUPTHER

## 2022-05-16 NOTE — TELEPHONE ENCOUNTER
Rx Refill Note  Requested Prescriptions     Pending Prescriptions Disp Refills   • meclizine (ANTIVERT) 25 MG tablet [Pharmacy Med Name: MECLIZINE 25MG RX TABLETS] 60 tablet 1     Sig: TAKE 1 TABLET BY MOUTH THREE TIMES DAILY AS NEEDED FOR DIZZINESS      Last office visit with prescribing clinician: 2/24/2022      Next office visit with prescribing clinician: Visit date not found            Nissa Boyer MA  05/16/22, 13:38 EDT

## 2022-09-11 DIAGNOSIS — E78.2 MIXED HYPERLIPIDEMIA: ICD-10-CM

## 2022-09-11 DIAGNOSIS — N40.0 BENIGN PROSTATIC HYPERPLASIA WITHOUT LOWER URINARY TRACT SYMPTOMS: ICD-10-CM

## 2022-09-12 RX ORDER — ATORVASTATIN CALCIUM 10 MG/1
TABLET, FILM COATED ORAL
Qty: 90 TABLET | Refills: 1 | Status: SHIPPED | OUTPATIENT
Start: 2022-09-12 | End: 2023-03-10

## 2022-09-12 RX ORDER — TAMSULOSIN HYDROCHLORIDE 0.4 MG/1
CAPSULE ORAL
Qty: 90 CAPSULE | Refills: 1 | Status: SHIPPED | OUTPATIENT
Start: 2022-09-12 | End: 2023-03-10

## 2022-09-12 NOTE — TELEPHONE ENCOUNTER
Rx Refill Note  Requested Prescriptions     Pending Prescriptions Disp Refills   • tamsulosin (FLOMAX) 0.4 MG capsule 24 hr capsule [Pharmacy Med Name: TAMSULOSIN 0.4MG CAPSULES] 90 capsule 1     Sig: TAKE 1 CAPSULE BY MOUTH EVERY DAY   • atorvastatin (LIPITOR) 10 MG tablet [Pharmacy Med Name: ATORVASTATIN 10MG TABLETS] 90 tablet 1     Sig: TAKE 1 TABLET BY MOUTH EVERY DAY      Last office visit with prescribing clinician: 2/24/2022      Next office visit with prescribing clinician: Visit date not found  lov 02/24/2022

## 2022-09-14 RX ORDER — PANTOPRAZOLE SODIUM 40 MG/1
TABLET, DELAYED RELEASE ORAL
Qty: 180 TABLET | Refills: 3 | Status: SHIPPED | OUTPATIENT
Start: 2022-09-14

## 2022-09-29 ENCOUNTER — OFFICE VISIT (OUTPATIENT)
Dept: FAMILY MEDICINE CLINIC | Facility: CLINIC | Age: 87
End: 2022-09-29

## 2022-09-29 VITALS
HEART RATE: 98 BPM | WEIGHT: 116 LBS | SYSTOLIC BLOOD PRESSURE: 124 MMHG | BODY MASS INDEX: 19.81 KG/M2 | HEIGHT: 64 IN | TEMPERATURE: 97.5 F | OXYGEN SATURATION: 94 % | DIASTOLIC BLOOD PRESSURE: 72 MMHG

## 2022-09-29 DIAGNOSIS — Z00.00 MEDICARE ANNUAL WELLNESS VISIT, SUBSEQUENT: Primary | ICD-10-CM

## 2022-09-29 DIAGNOSIS — E78.2 MIXED HYPERLIPIDEMIA: ICD-10-CM

## 2022-09-29 DIAGNOSIS — N18.30 STAGE 3 CHRONIC KIDNEY DISEASE, UNSPECIFIED WHETHER STAGE 3A OR 3B CKD: ICD-10-CM

## 2022-09-29 DIAGNOSIS — I10 PRIMARY HYPERTENSION: ICD-10-CM

## 2022-09-29 DIAGNOSIS — H81.10 BENIGN PAROXYSMAL POSITIONAL VERTIGO, UNSPECIFIED LATERALITY: ICD-10-CM

## 2022-09-29 PROCEDURE — 1159F MED LIST DOCD IN RCRD: CPT | Performed by: INTERNAL MEDICINE

## 2022-09-29 PROCEDURE — 96160 PT-FOCUSED HLTH RISK ASSMT: CPT | Performed by: INTERNAL MEDICINE

## 2022-09-29 PROCEDURE — G0439 PPPS, SUBSEQ VISIT: HCPCS | Performed by: INTERNAL MEDICINE

## 2022-09-29 PROCEDURE — 1170F FXNL STATUS ASSESSED: CPT | Performed by: INTERNAL MEDICINE

## 2022-09-29 RX ORDER — MECLIZINE HYDROCHLORIDE 25 MG/1
25 TABLET ORAL 3 TIMES DAILY PRN
Qty: 60 TABLET | Refills: 1 | Status: SHIPPED | OUTPATIENT
Start: 2022-09-29

## 2022-09-29 RX ORDER — TOBRAMYCIN 3 MG/ML
SOLUTION/ DROPS OPHTHALMIC
COMMUNITY
Start: 2022-09-26

## 2022-09-29 RX ORDER — MOXIFLOXACIN 5 MG/ML
SOLUTION/ DROPS OPHTHALMIC
COMMUNITY
Start: 2022-09-26

## 2022-09-29 NOTE — PROGRESS NOTES
Subsequent Medicare Wellness Visit   The ABC's of the Annual Wellness Visit    Chief Complaint   Patient presents with   • Dizziness   • medicare wellness       HPI:  Louis Villalba, -10/20/1932, is a 89 y.o. male who presents for a Subsequent Medicare Wellness Visit.    Friend was present, who transported patient, during the history-taking and subsequent discussion (and for part of the physical exam) with this patient.  Patient agrees to the presence of the individual during this visit.    Follow-up for cholesterol.  Currently, has been feeling well without any myalgias, muscle aches, weakness, numbness, chest pain, short of breath or other issues.  Currently, is adherent with medication regimen of atorvastatin 10 mg and denies medication side effects. Is due for lab follow-up.    Patient with history of intermittent vertigo/dizziness.  Was admitted to the hospital little over a year ago due to this for evaluation which is negative.  Was diagnosed with a BPPV and given meclizine.  The episodes of dizziness is mild and the meclizine is helping.    Recent Hospitalizations:  No hospitalization(s) within the last year..    Current Medical Providers:  Patient Care Team:  Adam Feliz MD as PCP - General (Internal Medicine)  Joe Davis OD (Optometry)  Stef Claire MD as Consulting Physician (Ophthalmology)  Eric Nuñez MD as Consulting Physician (Nephrology)  Hilton Chavarria MD as Consulting Physician (Pulmonary Disease)  Skyler Weller MD as Consulting Physician (Gastroenterology)    Health Habits and Functional and Cognitive Screening and Depression Screening:  Functional & Cognitive Status 2022   Do you have difficulty preparing food and eating? No   Do you have difficulty bathing yourself, getting dressed or grooming yourself? No   Do you have difficulty using the toilet? No   Do you have difficulty moving around from place to place? No   Do you have trouble with steps  or getting out of a bed or a chair? No   Current Diet Well Balanced Diet   Dental Exam Up to date   Eye Exam Up to date   Exercise (times per week) 5 times per week   Current Exercises Include (No Data)        Exercise Comment SILVER SNEAKERS   Current Exercise Activities Include -   Do you need help using the phone?  Yes   Are you deaf or do you have serious difficulty hearing?  No   Do you need help with transportation? Yes   Do you need help shopping? Yes   Do you need help preparing meals?  No   Do you need help with housework?  Yes   Do you need help with laundry? Yes   Do you need help taking your medications? No   Do you need help managing money? No   Do you ever drive or ride in a car without wearing a seat belt? No   Have you felt unusual stress, anger or loneliness in the last month? No   Who do you live with? Alone   If you need help, do you have trouble finding someone available to you? No   Have you been bothered in the last four weeks by sexual problems? No   Do you have difficulty concentrating, remembering or making decisions? -   Patient with chronic hearing loss and using hearing aids and macular degeneration with vision loss.    Compared to one year ago, the patient feels his physical health is the same and his mental health is the same.    Depression Screen:  PHQ-2/PHQ-9 Depression Screening 9/29/2022   Retired PHQ-9 Total Score -   Retired Total Score -   Little Interest or Pleasure in Doing Things 0-->not at all   Feeling Down, Depressed or Hopeless 0-->not at all   PHQ-9: Brief Depression Severity Measure Score 0     Falls Risk Assessment:  ANDREIA Fall Risk Clinician Key Questions   Have you fallen in the past year?: No  Do you feel unsteady with walking?: Yes  Patient has had intermittent dizziness off and on with BPPV.  This is increasing his concern about falling.    Past Medical/Family/Social History:  The following portions of the patient's history were reviewed and updated as  appropriate: allergies, current medications, past family history, past medical history, past social history, past surgical history and problem list.    Allergies   Allergen Reactions   • Polymyxin B-Trimethoprim Unknown - Low Severity   • Sulfa Antibiotics Other (See Comments)     Cannot recall reaction       Current Outpatient Medications:   •  albuterol (PROVENTIL HFA;VENTOLIN HFA) 108 (90 Base) MCG/ACT inhaler, Inhale 2 puffs Every 4 (Four) Hours As Needed for Wheezing., Disp: 2 inhaler, Rfl: 0  •  albuterol (PROVENTIL) (2.5 MG/3ML) 0.083% nebulizer solution, Take 2.5 mg by nebulization 4 (Four) Times a Day As Needed for Wheezing., Disp: 125 vial, Rfl: 2  •  atorvastatin (LIPITOR) 10 MG tablet, TAKE 1 TABLET BY MOUTH EVERY DAY, Disp: 90 tablet, Rfl: 1  •  brimonidine-timolol (COMBIGAN) 0.2-0.5 % ophthalmic solution, Administer 1 drop to both eyes Every 12 (Twelve) Hours., Disp: , Rfl:   •  Dietary Management Product (TOZAL PO), Take  by mouth. Takes 3 times daily - PT NOT SURE ABOUT THIS ONE, ON HIS MED LIST, Disp: , Rfl:   •  docusate sodium (COLACE) 100 MG capsule, Take 100 mg by mouth 2 (Two) Times a Day., Disp: , Rfl:   •  Incruse Ellipta 62.5 MCG/INH aerosol powder , INL 1 PUFF PO D, Disp: , Rfl:   •  meclizine (ANTIVERT) 25 MG tablet, Take 1 tablet by mouth 3 (Three) Times a Day As Needed for Dizziness., Disp: 60 tablet, Rfl: 1  •  moxifloxacin (VIGAMOX) 0.5 % ophthalmic solution, INSTILL 1 DROP IN LEFT EYE EVERY 2 HOURS AS DIRECTED, Disp: , Rfl:   •  Multiple Vitamins-Minerals (VITEYES AREDS FORMULA PO), Take 2 tablets by mouth Daily., Disp: , Rfl:   •  ofloxacin (OCUFLOX) 0.3 % ophthalmic solution, , Disp: , Rfl:   •  pantoprazole (PROTONIX) 40 MG EC tablet, TAKE 1 TABLET BY MOUTH TWICE DAILY, Disp: 180 tablet, Rfl: 3  •  tamsulosin (FLOMAX) 0.4 MG capsule 24 hr capsule, TAKE 1 CAPSULE BY MOUTH EVERY DAY, Disp: 90 capsule, Rfl: 1  •  Tiotropium Bromide Monohydrate (SPIRIVA RESPIMAT) 2.5 MCG/ACT aerosol  solution, Inhale 5 mg Daily., Disp: 1 inhaler, Rfl: 2  •  tobramycin 0.3 % solution ophthalmic solution, INSTILL 1 DROP IN LEFT EYE EVERY 2 HOURS, Disp: , Rfl:   •  acetaminophen (TYLENOL) 325 MG tablet, Take 2 tablets by mouth Every 6 (Six) Hours As Needed for Mild Pain ., Disp: , Rfl:   •  desonide (DESOWEN) 0.05 % cream, APPLY TO LEFT LOWER LID BID, Disp: , Rfl:   •  sucralfate (CARAFATE) 1 GM/10ML suspension, SHAKE LIQUID AND TAKE 10 ML BY MOUTH TWICE DAILY, Disp: 600 mL, Rfl: 1    Aspirin use counseling: Does not need ASA (and currently is not on it)    Current medication list contains no high risk medications.  No harmful drug interactions have been identified.     Family History   Problem Relation Age of Onset   • No Known Problems Mother    • No Known Problems Father    • Malig Hyperthermia Neg Hx      Social History     Tobacco Use   • Smoking status: Former Smoker     Packs/day: 1.50     Years: 10.00     Pack years: 15.00     Types: Cigarettes     Start date: 2000     Quit date:      Years since quittin.7   • Smokeless tobacco: Never Used   Substance Use Topics   • Alcohol use: Yes     Alcohol/week: 21.0 standard drinks     Types: 21 Cans of beer per week     Comment: drinks 2 beers daily     Past Surgical History:   Procedure Laterality Date   • APPENDECTOMY     • COLONOSCOPY     • ENDOSCOPY N/A 10/5/2016    Procedure: ESOPHAGOGASTRODUODENOSCOPY with biopsy;  Surgeon: Edward Gan MD;  Location: [facility] ENDOSCOPY;  Service:    • ENDOSCOPY N/A 2022    Procedure: ESOPHAGOGASTRODUODENOSCOPY WITH 8MM-12MM BALLOON DILATION AND BIOPSIES;  Surgeon: Skyler Weller MD;  Location: [facility] ENDOSCOPY;  Service: Gastroenterology;  Laterality: N/A;  PRE- DYSPHAGIA  POST-  HIATAL HERNIA, ESOPHAGEAL STRICTURE   • ENDOSCOPY N/A 2022    Procedure: ESOPHAGOGASTRODUODENOSCOPY WITH 15MM BALLOON PYLORIC DILATATION AND 12-18MM ESOPHAGEAL DILATATION;  Surgeon: Skyler Weller MD;   Location: [facility] ENDOSCOPY;  Service: Gastroenterology;  Laterality: N/A;  pre: dysphagia  post: esophageal stricture, pyloric stricture   • EYE SURGERY      cataract b/l surgery   • SKIN BIOPSY       Patient Active Problem List   Diagnosis   • Gastric outlet obstruction   • COPD (chronic obstructive pulmonary disease) (HCC)   • HTN (hypertension)   • Lung nodule   • CKD (chronic kidney disease) stage 3, GFR 30-59 ml/min (HCC)   • Benign prostatic hyperplasia without lower urinary tract symptoms   • Hyperkalemia   • Hyperlipidemia   • GERD (gastroesophageal reflux disease)   • Medicare annual wellness visit, subsequent   • BPPV (benign paroxysmal positional vertigo)   • Dizziness   • Shortness of breath   • Left otitis media   • Upper respiratory tract infection due to COVID-19 virus   • Elevated liver function tests   • Other dysphagia   • History of esophageal stricture     Review of Systems   Constitutional: Negative for activity change, appetite change, fatigue, fever, unexpected weight gain and unexpected weight loss.   HENT: Positive for hearing loss. Negative for nosebleeds, rhinorrhea, trouble swallowing and voice change.    Eyes: Positive for visual disturbance.   Respiratory: Negative for cough, chest tightness, shortness of breath and wheezing.    Cardiovascular: Negative for chest pain, palpitations and leg swelling.   Gastrointestinal: Negative for abdominal pain, blood in stool, constipation, diarrhea, nausea, vomiting, GERD and indigestion.   Genitourinary: Negative for dysuria, frequency and hematuria.   Musculoskeletal: Negative for arthralgias, back pain and myalgias.   Skin: Negative for rash and wound.   Neurological: Positive for dizziness. Negative for tremors, weakness, light-headedness, numbness, headache and memory problem.   Hematological: Negative for adenopathy. Does not bruise/bleed easily.   Psychiatric/Behavioral: Negative for sleep disturbance and depressed mood. The patient is not  "nervous/anxious.      Objective     Vitals:    09/29/22 0925   BP: 124/72   Pulse: 98   Temp: 97.5 °F (36.4 °C)   TempSrc: Infrared   SpO2: 94%   Weight: 52.6 kg (116 lb)   Height: 162.6 cm (64\")   PainSc: 0-No pain     BMI is within normal parameters. No other follow-up for BMI required.    The patient has no evidence of cognitve impairment.     Physical Exam  Vitals and nursing note reviewed.   Constitutional:       General: He is not in acute distress.     Appearance: He is well-developed. He is not diaphoretic.   HENT:      Head: Normocephalic and atraumatic.      Right Ear: External ear normal.      Left Ear: External ear normal.      Nose: Nose normal.   Eyes:      Conjunctiva/sclera: Conjunctivae normal.      Pupils: Pupils are equal, round, and reactive to light.   Neck:      Thyroid: No thyromegaly.      Trachea: No tracheal deviation.   Cardiovascular:      Rate and Rhythm: Normal rate and regular rhythm.      Heart sounds: Normal heart sounds. No murmur heard.    No friction rub. No gallop.   Pulmonary:      Effort: Pulmonary effort is normal. No respiratory distress.      Breath sounds: Normal breath sounds.   Abdominal:      General: Bowel sounds are normal.      Palpations: Abdomen is soft. There is no mass.      Tenderness: There is no abdominal tenderness. There is no guarding.   Musculoskeletal:         General: Normal range of motion.      Cervical back: Normal range of motion and neck supple.   Lymphadenopathy:      Cervical: No cervical adenopathy.   Skin:     General: Skin is warm and dry.      Capillary Refill: Capillary refill takes less than 2 seconds.      Findings: No rash.   Neurological:      Mental Status: He is alert and oriented to person, place, and time.      Motor: No abnormal muscle tone.      Deep Tendon Reflexes: Reflexes normal.   Psychiatric:         Behavior: Behavior normal.         Thought Content: Thought content normal.         Judgment: Judgment normal.       Recent Lab " Results:     Lab Results   Component Value Date    TRIG 52 04/30/2021    HDL 76 (H) 04/30/2021    VLDL 11 04/30/2021     Assessment & Plan   Age-appropriate Screening Schedule:  Refer to the list below for future screening recommendations based on patient's age, sex and/or medical conditions.      Health Maintenance   Topic Date Due   • TDAP/TD VACCINES (1 - Tdap) Never done   • ZOSTER VACCINE (1 of 2) Never done   • LIPID PANEL  04/30/2022   • INFLUENZA VACCINE  08/01/2022     Medicare Risks and Personalized Health Plan:  Fall Risk  Glaucoma Risk  Immunizations Discussed/Encouraged (specific immunizations; Tdap, Influenza, Shingrix and COVID19 )    CMS-Preventive Services Quick Reference  Medicare Preventive Services Addressed:  Annual Wellness Visit (AWV)  Glaucoma screening (for individuals with diabetes mellitus, family history of glaucoma, -Americans (> or =) age 50, -Americans (> or =) age 65)    Advance Care Planning:  ACP discussion was held with the patient during this visit. Patient has an advance directive in EMR which is still valid.     Diagnoses and all orders for this visit:    1. Medicare annual wellness visit, subsequent (Primary)    2. Benign paroxysmal positional vertigo, unspecified laterality  -     meclizine (ANTIVERT) 25 MG tablet; Take 1 tablet by mouth 3 (Three) Times a Day As Needed for Dizziness.  Dispense: 60 tablet; Refill: 1    3. Primary hypertension  -     Comprehensive Metabolic Panel  -     Lipid Panel  -     CBC & Differential    4. Mixed hyperlipidemia  -     Comprehensive Metabolic Panel  -     Lipid Panel    5. Stage 3 chronic kidney disease, unspecified whether stage 3a or 3b CKD (HCC)  -     Comprehensive Metabolic Panel      Annual wellness visit reviewed with patient.  All past history, medications, social history, and problem list were reviewed.  Discussed advanced directives and living will.  Patient has living will: Living will: Directive already scanned in  chart.  Discussed fall risk and precautions encourage removing throw rugs and using grab bars within the home and bathroom.  Will check the labs as ordered above to evaluate the blood sugars, kidney, liver, cholesterol for screening.  Discussed flu shot recommended to get the high-dose influenza vaccine annually in the fall.  The patient has started, but not completed, their COVID-19 vaccination series.  Prevnar-13 and pneumovax-23 up to date and appropriate.  Tdap, COVID by Valent booster, and Shingrix vaccination series recommended and has scheduled at the pharmacy.  Encourage follow-up with the eye doctor on annual basis for glaucoma evaluation.  Discussed weight and encouraged exercise as tolerated while following a healthy diet.  Follow up with current specialists as needed.    Continue the meclizine as needed for the dizziness.    An After Visit Summary and PPPS with all of these plans were given to the patient.      Follow Up:  No follow-ups on file.           · COVID-19 Precautions - Patient was compliant in wearing a mask. When I saw the patient, I used appropriate personal protective equipment (PPE) including mask and eye shield (standard procedure).  Additionally, I used gown and gloves if indicated.  Hand hygiene was completed before and after seeing the patient.  · Dictated utilizing Dragon Dictation

## 2022-09-29 NOTE — PATIENT INSTRUCTIONS
Medicare Wellness  Personal Prevention Plan of Service     Date of Office Visit:    Encounter Provider:  Adam Feliz MD  Place of Service:  [facility]  Patient Name: Louis Villalba  :  10/20/1932    As part of the Medicare Wellness portion of your visit today, we are providing you with this personalized preventive plan of services (PPPS). This plan is based upon recommendations of the [place] Preventive Services Task Force (USPSTF) and the Advisory Committee on Immunization Practices (ACIP).    This lists the preventive care services that should be considered, and provides dates of when you are due. Items listed as completed are up-to-date and do not require any further intervention.    Health Maintenance   Topic Date Due    TDAP/TD VACCINES (1 - Tdap) Never done    ZOSTER VACCINE (1 of 2) Never done    LIPID PANEL  2022    COVID-19 Vaccine (5 - Booster for Pfizer series) 2022    INFLUENZA VACCINE  2022    ANNUAL WELLNESS VISIT  2023    Pneumococcal Vaccine 65+  Completed       Orders Placed This Encounter   Procedures    Comprehensive Metabolic Panel     Order Specific Question:   Release to patient     Answer:   Routine Release    Lipid Panel    CBC & Differential     Order Specific Question:   Manual Differential     Answer:   No       Return in about 1 year (around 2023) for Medicare Wellness.

## 2022-09-30 LAB
ALBUMIN SERPL-MCNC: 4.6 G/DL (ref 3.5–5.2)
ALBUMIN/GLOB SERPL: 1.5 G/DL
ALP SERPL-CCNC: 125 U/L (ref 39–117)
ALT SERPL-CCNC: 13 U/L (ref 1–41)
AST SERPL-CCNC: 16 U/L (ref 1–40)
BASOPHILS # BLD AUTO: 0.03 10*3/MM3 (ref 0–0.2)
BASOPHILS NFR BLD AUTO: 0.3 % (ref 0–1.5)
BILIRUB SERPL-MCNC: 0.3 MG/DL (ref 0–1.2)
BUN SERPL-MCNC: 19 MG/DL (ref 8–23)
BUN/CREAT SERPL: 12.2 (ref 7–25)
CALCIUM SERPL-MCNC: 9.8 MG/DL (ref 8.6–10.5)
CHLORIDE SERPL-SCNC: 104 MMOL/L (ref 98–107)
CHOLEST SERPL-MCNC: 166 MG/DL (ref 0–200)
CO2 SERPL-SCNC: 22.7 MMOL/L (ref 22–29)
CREAT SERPL-MCNC: 1.56 MG/DL (ref 0.76–1.27)
EGFRCR SERPLBLD CKD-EPI 2021: 42.2 ML/MIN/1.73
EOSINOPHIL # BLD AUTO: 0.2 10*3/MM3 (ref 0–0.4)
EOSINOPHIL NFR BLD AUTO: 2.1 % (ref 0.3–6.2)
ERYTHROCYTE [DISTWIDTH] IN BLOOD BY AUTOMATED COUNT: 13.2 % (ref 12.3–15.4)
GLOBULIN SER CALC-MCNC: 3.1 GM/DL
GLUCOSE SERPL-MCNC: 112 MG/DL (ref 65–99)
HCT VFR BLD AUTO: 36.3 % (ref 37.5–51)
HDLC SERPL-MCNC: 77 MG/DL (ref 40–60)
HGB BLD-MCNC: 12 G/DL (ref 13–17.7)
IMM GRANULOCYTES # BLD AUTO: 0.04 10*3/MM3 (ref 0–0.05)
IMM GRANULOCYTES NFR BLD AUTO: 0.4 % (ref 0–0.5)
LDLC SERPL CALC-MCNC: 75 MG/DL (ref 0–100)
LYMPHOCYTES # BLD AUTO: 1.87 10*3/MM3 (ref 0.7–3.1)
LYMPHOCYTES NFR BLD AUTO: 19.6 % (ref 19.6–45.3)
MCH RBC QN AUTO: 30.1 PG (ref 26.6–33)
MCHC RBC AUTO-ENTMCNC: 33.1 G/DL (ref 31.5–35.7)
MCV RBC AUTO: 91 FL (ref 79–97)
MONOCYTES # BLD AUTO: 0.94 10*3/MM3 (ref 0.1–0.9)
MONOCYTES NFR BLD AUTO: 9.8 % (ref 5–12)
NEUTROPHILS # BLD AUTO: 6.48 10*3/MM3 (ref 1.7–7)
NEUTROPHILS NFR BLD AUTO: 67.8 % (ref 42.7–76)
NRBC BLD AUTO-RTO: 0 /100 WBC (ref 0–0.2)
PLATELET # BLD AUTO: 249 10*3/MM3 (ref 140–450)
POTASSIUM SERPL-SCNC: 5.3 MMOL/L (ref 3.5–5.2)
PROT SERPL-MCNC: 7.7 G/DL (ref 6–8.5)
RBC # BLD AUTO: 3.99 10*6/MM3 (ref 4.14–5.8)
SODIUM SERPL-SCNC: 140 MMOL/L (ref 136–145)
TRIGL SERPL-MCNC: 72 MG/DL (ref 0–150)
VLDLC SERPL CALC-MCNC: 14 MG/DL (ref 5–40)
WBC # BLD AUTO: 9.56 10*3/MM3 (ref 3.4–10.8)

## 2023-03-10 DIAGNOSIS — N40.0 BENIGN PROSTATIC HYPERPLASIA WITHOUT LOWER URINARY TRACT SYMPTOMS: ICD-10-CM

## 2023-03-10 DIAGNOSIS — E78.2 MIXED HYPERLIPIDEMIA: ICD-10-CM

## 2023-03-10 RX ORDER — ATORVASTATIN CALCIUM 10 MG/1
TABLET, FILM COATED ORAL
Qty: 90 TABLET | Refills: 1 | Status: SHIPPED | OUTPATIENT
Start: 2023-03-10

## 2023-03-10 RX ORDER — TAMSULOSIN HYDROCHLORIDE 0.4 MG/1
CAPSULE ORAL
Qty: 90 CAPSULE | Refills: 1 | Status: SHIPPED | OUTPATIENT
Start: 2023-03-10

## 2023-03-29 ENCOUNTER — TELEPHONE (OUTPATIENT)
Dept: FAMILY MEDICINE CLINIC | Facility: CLINIC | Age: 88
End: 2023-03-29
Payer: MEDICARE

## 2023-03-29 NOTE — TELEPHONE ENCOUNTER
FYI:  Pt daughter called and stated she was informed that the patient states he has extreme weakness and feels like he is going to pass out. It was advised to take patient to the ER for higher level of care.

## 2023-09-05 ENCOUNTER — TELEPHONE (OUTPATIENT)
Dept: FAMILY MEDICINE CLINIC | Facility: CLINIC | Age: 88
End: 2023-09-05
Payer: MEDICARE

## 2023-09-05 NOTE — TELEPHONE ENCOUNTER
HUB TO READ AND RELAY    Mailed letter to patient to schedule medicare wellness after 9/29/2023 for insurance purposes.

## 2023-09-06 DIAGNOSIS — N40.0 BENIGN PROSTATIC HYPERPLASIA WITHOUT LOWER URINARY TRACT SYMPTOMS: ICD-10-CM

## 2023-09-06 DIAGNOSIS — E78.2 MIXED HYPERLIPIDEMIA: ICD-10-CM

## 2023-09-06 RX ORDER — PANTOPRAZOLE SODIUM 40 MG/1
TABLET, DELAYED RELEASE ORAL
Qty: 180 TABLET | Refills: 3 | Status: SHIPPED | OUTPATIENT
Start: 2023-09-06

## 2023-09-06 RX ORDER — TAMSULOSIN HYDROCHLORIDE 0.4 MG/1
CAPSULE ORAL
Qty: 30 CAPSULE | Refills: 0 | Status: SHIPPED | OUTPATIENT
Start: 2023-09-06

## 2023-09-06 RX ORDER — ATORVASTATIN CALCIUM 10 MG/1
10 TABLET, FILM COATED ORAL DAILY
Qty: 90 TABLET | Refills: 0 | Status: SHIPPED | OUTPATIENT
Start: 2023-09-06

## 2023-10-02 ENCOUNTER — OFFICE VISIT (OUTPATIENT)
Dept: FAMILY MEDICINE CLINIC | Facility: CLINIC | Age: 88
End: 2023-10-02
Payer: MEDICARE

## 2023-10-02 VITALS
BODY MASS INDEX: 18.88 KG/M2 | HEART RATE: 57 BPM | WEIGHT: 110.6 LBS | DIASTOLIC BLOOD PRESSURE: 68 MMHG | OXYGEN SATURATION: 100 % | HEIGHT: 64 IN | TEMPERATURE: 97.8 F | SYSTOLIC BLOOD PRESSURE: 134 MMHG

## 2023-10-02 DIAGNOSIS — I10 PRIMARY HYPERTENSION: ICD-10-CM

## 2023-10-02 DIAGNOSIS — N40.0 BENIGN PROSTATIC HYPERPLASIA WITHOUT LOWER URINARY TRACT SYMPTOMS: ICD-10-CM

## 2023-10-02 DIAGNOSIS — E78.2 MIXED HYPERLIPIDEMIA: ICD-10-CM

## 2023-10-02 DIAGNOSIS — J44.9 CHRONIC OBSTRUCTIVE PULMONARY DISEASE, UNSPECIFIED COPD TYPE: ICD-10-CM

## 2023-10-02 DIAGNOSIS — Z00.00 MEDICARE ANNUAL WELLNESS VISIT, SUBSEQUENT: Primary | ICD-10-CM

## 2023-10-02 PROBLEM — H35.30 MACULAR DEGENERATION: Status: ACTIVE | Noted: 2023-10-02

## 2023-10-02 RX ORDER — TAMSULOSIN HYDROCHLORIDE 0.4 MG/1
1 CAPSULE ORAL DAILY
Qty: 90 CAPSULE | Refills: 3 | Status: SHIPPED | OUTPATIENT
Start: 2023-10-02

## 2023-10-02 RX ORDER — ATORVASTATIN CALCIUM 10 MG/1
10 TABLET, FILM COATED ORAL DAILY
Qty: 90 TABLET | Refills: 3 | Status: SHIPPED | OUTPATIENT
Start: 2023-10-02

## 2023-10-02 RX ORDER — GENTAMICIN SULFATE 3 MG/ML
SOLUTION/ DROPS OPHTHALMIC
COMMUNITY
Start: 2023-06-13

## 2023-10-02 NOTE — PATIENT INSTRUCTIONS
Medicare Wellness  Personal Prevention Plan of Service     Date of Office Visit:    Encounter Provider:  Adam Feliz MD  Place of Service:  [facility]  Patient Name: Louis Villalba  :  10/20/1932    As part of the Medicare Wellness portion of your visit today, we are providing you with this personalized preventive plan of services (PPPS). This plan is based upon recommendations of the [place] Preventive Services Task Force (USPSTF) and the Advisory Committee on Immunization Practices (ACIP).    This lists the preventive care services that should be considered, and provides dates of when you are due. Items listed as completed are up-to-date and do not require any further intervention.    Health Maintenance   Topic Date Due    TDAP/TD VACCINES (1 - Tdap) Never done    ZOSTER VACCINE (1 of 2) Never done    COVID-19 Vaccine (6 - - season) 2023    LIPID PANEL  2023    ANNUAL WELLNESS VISIT  10/02/2024    INFLUENZA VACCINE  Completed    Pneumococcal Vaccine 65+  Completed       Orders Placed This Encounter   Procedures    Comprehensive Metabolic Panel     Order Specific Question:   Release to patient     Answer:   Routine Release [[id number]]    CBC & Differential     Order Specific Question:   Manual Differential     Answer:   No     Order Specific Question:   Release to patient     Answer:   Routine Release [[id number]]       Return in about 6 months (around 2024) for Next scheduled follow up.

## 2023-10-02 NOTE — PROGRESS NOTES
The ABCs of the Annual Wellness Visit  Subsequent Medicare Wellness Visit    Subjective    Louis Villalba is a 89 y.o. male who presents for a Subsequent Medicare Wellness Visit.    The following portions of the patient's history were reviewed and   updated as appropriate: allergies, current medications, past family history, past medical history, past social history, past surgical history, and problem list.    Compared to one year ago, the patient feels his physical   health is the same.    Compared to one year ago, the patient feels his mental   health is the same.    Recent Hospitalizations:  He was not admitted to the hospital during the last year.       Current Medical Providers:  Patient Care Team:  Adam Feliz MD as PCP - General (Internal Medicine)  Joe Davis OD (Optometry)  Stef Claire MD as Consulting Physician (Ophthalmology)  Eric Nuñez MD as Consulting Physician (Nephrology)  Hilton Chavarria MD as Consulting Physician (Pulmonary Disease)  Skyler Weller MD as Consulting Physician (Gastroenterology)    Outpatient Medications Prior to Visit   Medication Sig Dispense Refill    albuterol (PROVENTIL HFA;VENTOLIN HFA) 108 (90 Base) MCG/ACT inhaler Inhale 2 puffs Every 4 (Four) Hours As Needed for Wheezing. 2 inhaler 0    albuterol (PROVENTIL) (2.5 MG/3ML) 0.083% nebulizer solution Take 2.5 mg by nebulization 4 (Four) Times a Day As Needed for Wheezing. 125 vial 2    brimonidine-timolol (COMBIGAN) 0.2-0.5 % ophthalmic solution Administer 1 drop to both eyes Every 12 (Twelve) Hours.      Dietary Management Product (TOZAL PO) Take  by mouth. Takes 3 times daily - PT NOT SURE ABOUT THIS ONE, ON HIS MED LIST      docusate sodium (COLACE) 100 MG capsule Take 1 capsule by mouth 2 (Two) Times a Day.      gentamicin (GARAMYCIN) 0.3 % ophthalmic solution INSTILL 1 DROP IN LEFT EYE FOUR TIMES DAILY 4 DAYS BEFORE INJECTIONS AND 4 DAYS AFTER INJECTIONS      meclizine (ANTIVERT)  25 MG tablet Take 1 tablet by mouth 3 (Three) Times a Day As Needed for Dizziness. 60 tablet 1    moxifloxacin (VIGAMOX) 0.5 % ophthalmic solution INSTILL 1 DROP IN LEFT EYE EVERY 2 HOURS AS DIRECTED      ofloxacin (OCUFLOX) 0.3 % ophthalmic solution       pantoprazole (PROTONIX) 40 MG EC tablet TAKE 1 TABLET BY MOUTH TWICE DAILY 180 tablet 3    Tiotropium Bromide Monohydrate (SPIRIVA RESPIMAT) 2.5 MCG/ACT aerosol solution Inhale 5 mg Daily. 1 inhaler 2    tobramycin 0.3 % solution ophthalmic solution INSTILL 1 DROP IN LEFT EYE EVERY 2 HOURS      acetaminophen (TYLENOL) 325 MG tablet Take 2 tablets by mouth Every 6 (Six) Hours As Needed for Mild Pain .      atorvastatin (LIPITOR) 10 MG tablet Take 1 tablet by mouth Daily. 90 tablet 0    desonide (DESOWEN) 0.05 % cream APPLY TO LEFT LOWER LID BID      Incruse Ellipta 62.5 MCG/INH aerosol powder  INL 1 PUFF PO D      Multiple Vitamins-Minerals (VITEYES AREDS FORMULA PO) Take 2 tablets by mouth Daily.      sucralfate (CARAFATE) 1 GM/10ML suspension SHAKE LIQUID AND TAKE 10 ML BY MOUTH TWICE DAILY 600 mL 1    tamsulosin (FLOMAX) 0.4 MG capsule 24 hr capsule TAKE 1 CAPSULE BY MOUTH EVERY DAY 30 capsule 0     No facility-administered medications prior to visit.       No opioid medication identified on active medication list. I have reviewed chart for other potential  high risk medication/s and harmful drug interactions in the elderly.        Aspirin is not on active medication list.  Aspirin use is not indicated based on review of current medical condition/s. Risk of harm outweighs potential benefits.  .    Patient Active Problem List   Diagnosis    Gastric outlet obstruction    COPD (chronic obstructive pulmonary disease)    HTN (hypertension)    Lung nodule    CKD (chronic kidney disease) stage 3, GFR 30-59 ml/min    Benign prostatic hyperplasia without lower urinary tract symptoms    Hyperkalemia    Hyperlipidemia    GERD (gastroesophageal reflux disease)    Medicare  "annual wellness visit, subsequent    BPPV (benign paroxysmal positional vertigo)    Dizziness    Shortness of breath    Left otitis media    Upper respiratory tract infection due to COVID-19 virus    Elevated liver function tests    Other dysphagia    History of esophageal stricture    Macular degeneration     Advance Care Planning   Advance Care Planning     Advance Directive is on file.  ACP discussion was held with the patient during this visit. Patient has an advance directive in EMR which is still valid.      Objective    Vitals:    10/02/23 1150   BP: 134/68   BP Location: Left arm   Patient Position: Sitting   Cuff Size: Adult   Pulse: 57   Temp: 97.8 °F (36.6 °C)   TempSrc: Temporal   SpO2: 100%   Weight: 50.2 kg (110 lb 9.6 oz)   Height: 162.6 cm (64\")     Estimated body mass index is 18.98 kg/m² as calculated from the following:    Height as of this encounter: 162.6 cm (64\").    Weight as of this encounter: 50.2 kg (110 lb 9.6 oz).    BMI is within normal parameters. No other follow-up for BMI required.      Does the patient have evidence of cognitive impairment? No          HEALTH RISK ASSESSMENT    Smoking Status:  Social History     Tobacco Use   Smoking Status Former    Packs/day: 1.50    Years: 10.00    Pack years: 15.00    Types: Cigarettes    Start date: 2000    Quit date:     Years since quittin.7    Passive exposure: Past   Smokeless Tobacco Never     Alcohol Consumption:  Social History     Substance and Sexual Activity   Alcohol Use Yes    Alcohol/week: 21.0 standard drinks    Types: 21 Cans of beer per week    Comment: drinks 2 beers daily     Fall Risk Screen:    STEADI Fall Risk Assessment was completed, and patient is at MODERATE risk for falls. Assessment completed on:10/2/2023    Depression Screening:      10/2/2023    11:56 AM   PHQ-2/PHQ-9 Depression Screening   Little Interest or Pleasure in Doing Things 0-->not at all   Feeling Down, Depressed or Hopeless 0-->not at all "   PHQ-9: Brief Depression Severity Measure Score 0       Health Habits and Functional and Cognitive Screening:      10/2/2023    11:57 AM   Functional & Cognitive Status   Do you have difficulty preparing food and eating? No   Do you have difficulty bathing yourself, getting dressed or grooming yourself? No   Do you have difficulty using the toilet? No   Do you have difficulty moving around from place to place? No   Do you have trouble with steps or getting out of a bed or a chair? Yes   Current Diet Well Balanced Diet   Dental Exam Up to date   Eye Exam Up to date   Exercise (times per week) 2 times per week   Current Exercises Include Other   Do you need help using the phone?  No   Are you deaf or do you have serious difficulty hearing?  Yes   Do you need help to go to places out of walking distance? No   Do you need help shopping? No   Do you need help preparing meals?  No   Do you need help with housework?  No   Do you need help with laundry? No   Do you need help taking your medications? No   Do you need help managing money? Yes   Do you ever drive or ride in a car without wearing a seat belt? No   Have you felt unusual stress, anger or loneliness in the last month? Yes   Who do you live with? Alone   If you need help, do you have trouble finding someone available to you? No   Have you been bothered in the last four weeks by sexual problems? No   Do you have difficulty concentrating, remembering or making decisions? No       Age-appropriate Screening Schedule:  Refer to the list below for future screening recommendations based on patient's age, sex and/or medical conditions. Orders for these recommended tests are listed in the plan section. The patient has been provided with a written plan.    Health Maintenance   Topic Date Due    TDAP/TD VACCINES (1 - Tdap) Never done    ZOSTER VACCINE (1 of 2) Never done    COVID-19 Vaccine (6 - 2023-24 season) 09/01/2023    LIPID PANEL  09/29/2023    ANNUAL WELLNESS VISIT   10/02/2024    INFLUENZA VACCINE  Completed    Pneumococcal Vaccine 65+  Completed                  CMS Preventative Services Quick Reference  Risk Factors Identified During Encounter  Immunizations Discussed/Encouraged: Tdap, Shingrix, and COVID19  The above risks/problems have been discussed with the patient.  Pertinent information has been shared with the patient in the After Visit Summary.  An After Visit Summary and PPPS were made available to the patient.    Annual wellness visit reviewed with patient.  All past history, medications, social history, and problem list were reviewed.  Discussed advanced directives and living will.  Patient has living will: Living will: Directive already scanned in chart.  Discussed fall risk and precautions encourage removing throw rugs and using grab bars within the home and bathroom.  Will check the labs as ordered above to evaluate the blood sugars, kidney, liver, cholesterol for screening.  Discussed flu shot recommended to get the high-dose influenza vaccine annually in the fall.  The patient has started, but not completed, their COVID-19 vaccination series.  Prevnar-13 and pneumovax-23 up to date and appropriate.  COVID booster, Tdap, and RSV vaccination recommended.  Encourage follow-up with the eye doctor on annual basis for glaucoma evaluation.  Discussed weight and encouraged exercise as tolerated while following a healthy diet.  Follow up with current specialists as needed.      Follow Up:   Next Medicare Wellness visit to be scheduled in 1 year.       Additional E&M Note during same encounter follows:  Patient has multiple medical problems which are significant and separately identifiable that require additional work above and beyond the Medicare Wellness Visit.      Chief Complaint  Medicare Wellness-subsequent (There has been more fatigued then normal, he has dropped 8 pounds in a little over a month, he has a blood clot behind his left eye, he has had some more  "wheezing there normal and coughing.//They also want to talk about taking his vaccines ad what he should take and if it is okay to take them. )    Subjective        HPI  Louis Villalba is also being seen today for increased fatigue from his baseline with an 8 pound weight loss over the last month.  Is noted to have a blood clot behind his left eye he is having more wheezing than his normal and increased coughing.    Follow-up for cholesterol.  Currently, has been feeling well without any myalgias, muscle aches, weakness, numbness, chest pain, short of breath or other issues.  Currently, is adherent with medication regimen of atorvastatin 10 mg and denies medication side effects. Is due for lab follow-up.     Patient with history of intermittent vertigo/dizziness.  Was admitted to the hospital little over a year ago due to this for evaluation which is negative.  Was diagnosed with a BPPV and given meclizine.  The episodes of dizziness is mild and the meclizine is helping.       Objective   Vital Signs:  /68 (BP Location: Left arm, Patient Position: Sitting, Cuff Size: Adult)   Pulse 57   Temp 97.8 °F (36.6 °C) (Temporal)   Ht 162.6 cm (64\")   Wt 50.2 kg (110 lb 9.6 oz)   SpO2 100%   BMI 18.98 kg/m²     Physical Exam                    Assessment and Plan   Diagnoses and all orders for this visit:    1. Medicare annual wellness visit, subsequent (Primary)    2. Chronic obstructive pulmonary disease, unspecified COPD type  -     tiotropium bromide-olodaterol (STIOLTO RESPIMAT) 2.5-2.5 MCG/ACT aerosol solution inhaler; Inhale 2 puffs Daily.  Dispense: 1 each; Refill: 0    3. Primary hypertension  -     CBC & Differential  -     Comprehensive Metabolic Panel    4. Mixed hyperlipidemia  -     atorvastatin (LIPITOR) 10 MG tablet; Take 1 tablet by mouth Daily.  Dispense: 90 tablet; Refill: 3  -     Comprehensive Metabolic Panel    5. Benign prostatic hyperplasia without lower urinary tract symptoms  -     " tamsulosin (FLOMAX) 0.4 MG capsule 24 hr capsule; Take 1 capsule by mouth Daily.  Dispense: 90 capsule; Refill: 3      Encouraged increased PO intake and to consider using nutritional  shakes.  COPD issues with cough and wheezing using only the spiriva and albuterol as needed.  Will change so that he is taking the Stiolto Respimat 2 puffs once a day along with his albuterol as needed.  He is to continue to follow-up with his eye doctor for his retinal issues with the clot.  Check labs as noted above.       Follow Up   Return in about 6 months (around 4/2/2024) for Next scheduled follow up.  Patient was given instructions and counseling regarding his condition or for health maintenance advice. Please see specific information pulled into the AVS if appropriate.

## 2023-10-03 LAB
ALBUMIN SERPL-MCNC: 4.6 G/DL (ref 3.6–4.6)
ALBUMIN/GLOB SERPL: 1.6 {RATIO} (ref 1.2–2.2)
ALP SERPL-CCNC: 118 IU/L (ref 44–121)
ALT SERPL-CCNC: 13 IU/L (ref 0–44)
AST SERPL-CCNC: 21 IU/L (ref 0–40)
BASOPHILS # BLD AUTO: 0 X10E3/UL (ref 0–0.2)
BASOPHILS NFR BLD AUTO: 0 %
BILIRUB SERPL-MCNC: 0.3 MG/DL (ref 0–1.2)
BUN SERPL-MCNC: 23 MG/DL (ref 10–36)
BUN/CREAT SERPL: 15 (ref 10–24)
CALCIUM SERPL-MCNC: 9.4 MG/DL (ref 8.6–10.2)
CHLORIDE SERPL-SCNC: 100 MMOL/L (ref 96–106)
CO2 SERPL-SCNC: 21 MMOL/L (ref 20–29)
CREAT SERPL-MCNC: 1.54 MG/DL (ref 0.76–1.27)
EGFRCR SERPLBLD CKD-EPI 2021: 43 ML/MIN/1.73
EOSINOPHIL # BLD AUTO: 0.5 X10E3/UL (ref 0–0.4)
EOSINOPHIL NFR BLD AUTO: 5 %
ERYTHROCYTE [DISTWIDTH] IN BLOOD BY AUTOMATED COUNT: 13.4 % (ref 11.6–15.4)
GLOBULIN SER CALC-MCNC: 2.9 G/DL (ref 1.5–4.5)
GLUCOSE SERPL-MCNC: 95 MG/DL (ref 70–99)
HCT VFR BLD AUTO: 35.9 % (ref 37.5–51)
HGB BLD-MCNC: 11.9 G/DL (ref 13–17.7)
IMM GRANULOCYTES # BLD AUTO: 0 X10E3/UL (ref 0–0.1)
IMM GRANULOCYTES NFR BLD AUTO: 0 %
LYMPHOCYTES # BLD AUTO: 2.2 X10E3/UL (ref 0.7–3.1)
LYMPHOCYTES NFR BLD AUTO: 22 %
MCH RBC QN AUTO: 30.9 PG (ref 26.6–33)
MCHC RBC AUTO-ENTMCNC: 33.1 G/DL (ref 31.5–35.7)
MCV RBC AUTO: 93 FL (ref 79–97)
MONOCYTES # BLD AUTO: 1 X10E3/UL (ref 0.1–0.9)
MONOCYTES NFR BLD AUTO: 10 %
NEUTROPHILS # BLD AUTO: 6.2 X10E3/UL (ref 1.4–7)
NEUTROPHILS NFR BLD AUTO: 63 %
PLATELET # BLD AUTO: 251 X10E3/UL (ref 150–450)
POTASSIUM SERPL-SCNC: 5 MMOL/L (ref 3.5–5.2)
PROT SERPL-MCNC: 7.5 G/DL (ref 6–8.5)
RBC # BLD AUTO: 3.85 X10E6/UL (ref 4.14–5.8)
SODIUM SERPL-SCNC: 135 MMOL/L (ref 134–144)
WBC # BLD AUTO: 9.9 X10E3/UL (ref 3.4–10.8)

## 2023-10-04 ENCOUNTER — TELEPHONE (OUTPATIENT)
Dept: FAMILY MEDICINE CLINIC | Facility: CLINIC | Age: 88
End: 2023-10-04
Payer: MEDICARE

## 2023-10-04 NOTE — TELEPHONE ENCOUNTER
Pt daughter, Debbie called  and stated the following.  Pt had a RVS injection on 10/2/2023. Pt had an episode of vomiting mucus on 10/03/2023 and it stopped. Today has started to vomit mucus again and is hoarse.   Please advise

## 2023-10-05 NOTE — TELEPHONE ENCOUNTER
I called and spoke with the patients daughter Debbie. She said she had spoken with her dad this morning and he was doing better. She said he was really sick for a couple of days but she has been making him drink Gatorade and he is feeling better. She said she will keep an eye on him for signs of dehydration or worsening symptoms and will call us back if she needs us.

## 2023-11-02 DIAGNOSIS — J44.9 CHRONIC OBSTRUCTIVE PULMONARY DISEASE, UNSPECIFIED COPD TYPE: ICD-10-CM

## 2023-11-02 NOTE — TELEPHONE ENCOUNTER
Caller: Dulce Vaughan    Relationship: Emergency Contact    Best call back number: [phone]    Requested Prescriptions:   Requested Prescriptions     Pending Prescriptions Disp Refills    tiotropium bromide-olodaterol (STIOLTO RESPIMAT) 2.5-2.5 MCG/ACT aerosol solution inhaler 1 each 0     Sig: Inhale 2 puffs Daily.        Pharmacy where request should be sent: [id number] [address] [place] [address] AT [facility] - [phone] [facility] [phone] FX     Last office visit with prescribing clinician: 10/2/2023   Last telemedicine visit with prescribing clinician: Visit date not found   Next office visit with prescribing clinician: Visit date not found     Additional details provided by patient: PATIENT COMPLETELY OUT    Does the patient have less than a 3 day supply:  [x] Yes  [] No    Would you like a call back once the refill request has been completed: [] Yes [x] No    If the office needs to give you a call back, can they leave a voicemail: [] Yes [x] No    Andrey Wong Rep   11/02/23 10:13 EDT

## 2023-11-22 ENCOUNTER — TELEPHONE (OUTPATIENT)
Dept: FAMILY MEDICINE CLINIC | Facility: CLINIC | Age: 88
End: 2023-11-22

## 2023-11-22 DIAGNOSIS — J44.9 CHRONIC OBSTRUCTIVE PULMONARY DISEASE, UNSPECIFIED COPD TYPE: ICD-10-CM

## 2024-01-02 DIAGNOSIS — E78.2 MIXED HYPERLIPIDEMIA: ICD-10-CM

## 2024-01-03 RX ORDER — ATORVASTATIN CALCIUM 10 MG/1
10 TABLET, FILM COATED ORAL DAILY
Qty: 90 TABLET | Refills: 3 | OUTPATIENT
Start: 2024-01-03

## 2024-01-04 ENCOUNTER — TELEPHONE (OUTPATIENT)
Dept: FAMILY MEDICINE CLINIC | Facility: CLINIC | Age: 89
End: 2024-01-04

## 2024-01-04 ENCOUNTER — OFFICE VISIT (OUTPATIENT)
Dept: FAMILY MEDICINE CLINIC | Facility: CLINIC | Age: 89
End: 2024-01-04
Payer: MEDICARE

## 2024-01-04 VITALS
DIASTOLIC BLOOD PRESSURE: 70 MMHG | WEIGHT: 114.4 LBS | OXYGEN SATURATION: 95 % | HEART RATE: 105 BPM | SYSTOLIC BLOOD PRESSURE: 140 MMHG | BODY MASS INDEX: 19.64 KG/M2 | TEMPERATURE: 97.8 F

## 2024-01-04 DIAGNOSIS — J06.9 UPPER RESPIRATORY TRACT INFECTION, UNSPECIFIED TYPE: ICD-10-CM

## 2024-01-04 DIAGNOSIS — J44.9 CHRONIC OBSTRUCTIVE PULMONARY DISEASE, UNSPECIFIED COPD TYPE: ICD-10-CM

## 2024-01-04 DIAGNOSIS — R05.9 COUGH, UNSPECIFIED TYPE: Primary | ICD-10-CM

## 2024-01-04 LAB
EXPIRATION DATE: NORMAL
FLUAV AG UPPER RESP QL IA.RAPID: NOT DETECTED
FLUBV AG UPPER RESP QL IA.RAPID: NOT DETECTED
INTERNAL CONTROL: NORMAL
Lab: NORMAL
SARS-COV-2 AG UPPER RESP QL IA.RAPID: NOT DETECTED

## 2024-01-04 PROCEDURE — 1160F RVW MEDS BY RX/DR IN RCRD: CPT | Performed by: NURSE PRACTITIONER

## 2024-01-04 PROCEDURE — 87428 SARSCOV & INF VIR A&B AG IA: CPT | Performed by: NURSE PRACTITIONER

## 2024-01-04 PROCEDURE — 99213 OFFICE O/P EST LOW 20 MIN: CPT | Performed by: NURSE PRACTITIONER

## 2024-01-04 PROCEDURE — 1159F MED LIST DOCD IN RCRD: CPT | Performed by: NURSE PRACTITIONER

## 2024-01-04 RX ORDER — BENZONATATE 100 MG/1
100 CAPSULE ORAL 3 TIMES DAILY PRN
Qty: 30 CAPSULE | Refills: 0 | Status: SHIPPED | OUTPATIENT
Start: 2024-01-04

## 2024-01-04 RX ORDER — AMOXICILLIN 500 MG/1
500 CAPSULE ORAL 2 TIMES DAILY
Qty: 14 CAPSULE | Refills: 0 | Status: SHIPPED | OUTPATIENT
Start: 2024-01-04

## 2024-01-04 NOTE — PROGRESS NOTES
"Chief Complaint  Med Refill (Needs stiolto refilled), Cough (productive), and Fatigue    Subjective        Louis Villalba presents to [facility]  History of Present Illness  Patient is here due to coughing for 3 days. Runny nose but denies sore throat. Denies fever, chills, shortness of breath. He is having some fatigue. He does have COPD. He uses nebulizers and inhalers as prescribed. Denies any sick contacts recently. He got a blood clot in his eye after his most recent vaccine.   Objective   Vital Signs:  /70 (BP Location: Left arm, Patient Position: Sitting, Cuff Size: Adult)   Pulse 105   Temp 97.8 °F (36.6 °C) (Temporal)   Wt 51.9 kg (114 lb 6.4 oz)   SpO2 95%   BMI 19.64 kg/m²   Estimated body mass index is 19.64 kg/m² as calculated from the following:    Height as of 10/2/23: 162.6 cm (64\").    Weight as of this encounter: 51.9 kg (114 lb 6.4 oz).       BMI is within normal parameters. No other follow-up for BMI required.      Physical Exam  Constitutional:       Appearance: Normal appearance.   HENT:      Head: Normocephalic.   Eyes:      General: Visual field deficit present.   Cardiovascular:      Rate and Rhythm: Normal rate and regular rhythm.      Heart sounds: Normal heart sounds.   Pulmonary:      Effort: Pulmonary effort is normal.      Breath sounds: Normal breath sounds.   Musculoskeletal:         General: Normal range of motion.      Cervical back: Normal range of motion.   Skin:     General: Skin is warm and dry.   Neurological:      General: No focal deficit present.      Mental Status: He is alert and oriented to person, place, and time.   Psychiatric:         Mood and Affect: Mood normal.        Result Review :                   Assessment and Plan   Diagnoses and all orders for this visit:    1. Cough, unspecified type (Primary)  -     POCT SARS-CoV-2 Antigen JULIETA + Flu  -     benzonatate (Tessalon Perles) 100 MG capsule; Take 1 capsule by mouth 3 " (Three) Times a Day As Needed for Cough.  Dispense: 30 capsule; Refill: 0    2. Upper respiratory tract infection, unspecified type  -     amoxicillin (AMOXIL) 500 MG capsule; Take 1 capsule by mouth 2 (Two) Times a Day.  Dispense: 14 capsule; Refill: 0    3. Chronic obstructive pulmonary disease, unspecified COPD type  -     tiotropium bromide-olodaterol (STIOLTO RESPIMAT) 2.5-2.5 MCG/ACT aerosol solution inhaler; Inhale 2 puffs Daily.  Dispense: 1 each; Refill: 11             Follow Up   Return if symptoms worsen or fail to improve.  Patient was given instructions and counseling regarding his condition or for health maintenance advice. Please see specific information pulled into the AVS if appropriate.

## 2024-01-04 NOTE — TELEPHONE ENCOUNTER
LMTCB    **HUB/FO** MAY RELAY MESSAGE        can you call this patient's daughter and tell her the stiolto replaced the inhaler spiriva and symbicort should also be discontinued, thanks.

## 2024-01-04 NOTE — TELEPHONE ENCOUNTER
Name: Dulce Vaughan      Relationship: Emergency Contact      Best Callback Number: [phone]      HUB PROVIDED THE RELAY MESSAGE FROM THE OFFICE      PATIENT: VOICED UNDERSTANDING AND HAS NO FURTHER QUESTIONS AT THIS TIME    ADDITIONAL INFORMATION:

## 2024-01-22 DIAGNOSIS — J44.9 CHRONIC OBSTRUCTIVE PULMONARY DISEASE, UNSPECIFIED COPD TYPE: ICD-10-CM

## 2024-01-22 RX ORDER — TIOTROPIUM BROMIDE AND OLODATEROL 3.124; 2.736 UG/1; UG/1
2 SPRAY, METERED RESPIRATORY (INHALATION) DAILY
Qty: 4 G | Refills: 1 | Status: SHIPPED | OUTPATIENT
Start: 2024-01-22

## 2024-03-22 DIAGNOSIS — J44.9 CHRONIC OBSTRUCTIVE PULMONARY DISEASE, UNSPECIFIED COPD TYPE: ICD-10-CM

## 2024-03-25 RX ORDER — TIOTROPIUM BROMIDE AND OLODATEROL 3.124; 2.736 UG/1; UG/1
2 SPRAY, METERED RESPIRATORY (INHALATION) DAILY
Qty: 4 G | Refills: 5 | Status: SHIPPED | OUTPATIENT
Start: 2024-03-25

## 2024-04-16 ENCOUNTER — OFFICE VISIT (OUTPATIENT)
Dept: FAMILY MEDICINE CLINIC | Facility: CLINIC | Age: 89
End: 2024-04-16
Payer: MEDICARE

## 2024-04-16 VITALS
OXYGEN SATURATION: 99 % | HEIGHT: 64 IN | WEIGHT: 116 LBS | SYSTOLIC BLOOD PRESSURE: 126 MMHG | DIASTOLIC BLOOD PRESSURE: 72 MMHG | HEART RATE: 103 BPM | TEMPERATURE: 97.6 F | BODY MASS INDEX: 19.81 KG/M2

## 2024-04-16 DIAGNOSIS — K21.00 GASTROESOPHAGEAL REFLUX DISEASE WITH ESOPHAGITIS WITHOUT HEMORRHAGE: ICD-10-CM

## 2024-04-16 DIAGNOSIS — H81.10 BENIGN PAROXYSMAL POSITIONAL VERTIGO, UNSPECIFIED LATERALITY: ICD-10-CM

## 2024-04-16 DIAGNOSIS — H66.92 LEFT OTITIS MEDIA, UNSPECIFIED OTITIS MEDIA TYPE: ICD-10-CM

## 2024-04-16 DIAGNOSIS — D64.9 ANEMIA, UNSPECIFIED TYPE: ICD-10-CM

## 2024-04-16 DIAGNOSIS — R42 DIZZINESS: Primary | ICD-10-CM

## 2024-04-16 LAB
BILIRUB BLD-MCNC: NEGATIVE MG/DL
CLARITY, POC: CLEAR
COLOR UR: YELLOW
EXPIRATION DATE: ABNORMAL
GLUCOSE UR STRIP-MCNC: NEGATIVE MG/DL
KETONES UR QL: NEGATIVE
LEUKOCYTE EST, POC: NEGATIVE
Lab: ABNORMAL
NITRITE UR-MCNC: NEGATIVE MG/ML
PH UR: 6 [PH] (ref 5–8)
PROT UR STRIP-MCNC: ABNORMAL MG/DL
RBC # UR STRIP: NEGATIVE /UL
SP GR UR: 1.01 (ref 1–1.03)
UROBILINOGEN UR QL: NORMAL

## 2024-04-16 PROCEDURE — 1159F MED LIST DOCD IN RCRD: CPT | Performed by: NURSE PRACTITIONER

## 2024-04-16 PROCEDURE — 1160F RVW MEDS BY RX/DR IN RCRD: CPT | Performed by: NURSE PRACTITIONER

## 2024-04-16 PROCEDURE — 81003 URINALYSIS AUTO W/O SCOPE: CPT | Performed by: NURSE PRACTITIONER

## 2024-04-16 PROCEDURE — 99214 OFFICE O/P EST MOD 30 MIN: CPT | Performed by: NURSE PRACTITIONER

## 2024-04-16 RX ORDER — BUDESONIDE AND FORMOTEROL FUMARATE DIHYDRATE 160; 4.5 UG/1; UG/1
2 AEROSOL RESPIRATORY (INHALATION) 2 TIMES DAILY
COMMUNITY
Start: 2024-01-04 | End: 2024-04-17

## 2024-04-16 RX ORDER — MECLIZINE HYDROCHLORIDE 25 MG/1
25 TABLET ORAL 3 TIMES DAILY PRN
Qty: 60 TABLET | Refills: 1 | Status: SHIPPED | OUTPATIENT
Start: 2024-04-16

## 2024-04-16 RX ORDER — ERYTHROMYCIN 5 MG/G
OINTMENT OPHTHALMIC ONCE
COMMUNITY
Start: 2024-04-08

## 2024-04-16 RX ORDER — ACYCLOVIR 800 MG/1
1 TABLET ORAL 3 TIMES DAILY
COMMUNITY
Start: 2024-04-08

## 2024-04-16 NOTE — PROGRESS NOTES
"Subjective   Louis Villalba is a 89 y.o. male.     Chief Complaint   Patient presents with    Dizziness    Med Refill     Meclizine        History of Present Illness   Patient presents with c/o dizziness; symptoms started about 1.5 weeks ago; has had trouble with dizziness due to ear crystals out of alignment in past; has done vestibular therapy in past and helped; no room spinning; has to change positions slowly to prevent dizziness; totally blind; no noted symptoms in bed; feels lightheaded at rest, worse with movement; no ear pain; no runny/stuffy nose; no nausea/vomiting; no fever; uses liquid IV twice daily to prevent dehydration; no change in urine output; does have trouble urinating and \"slow process;\" has been doing some exercises for crystals last few days and has helped some; would like referral for vestibular therapy; has been taking Meclizine TID for last 3-4 days and has helped; needs refill today.    Currently taking Acyclovir TID for ulcer on eye; to stay on Acyclovir for another 3 weeks; sees Joanie.    F/U COPD: uses Albuterol nebulizer in mornings and uses Stiolto daily and working well.    F/U ANTONY: takes Pantoprazole daily and works well.      Sister  was present during the history-taking and subsequent discussion with this patient.  Patient agrees to the presence of the individual during this visit.        The following portions of the patient's history were reviewed and updated as appropriate: allergies, current medications, past family history, past medical history, past social history, past surgical history and problem list.    Current Outpatient Medications on File Prior to Visit   Medication Sig    acyclovir (ZOVIRAX) 800 MG tablet Take 1 tablet by mouth 3 times a day.    albuterol (PROVENTIL HFA;VENTOLIN HFA) 108 (90 Base) MCG/ACT inhaler Inhale 2 puffs Every 4 (Four) Hours As Needed for Wheezing.    albuterol (PROVENTIL) (2.5 MG/3ML) 0.083% nebulizer solution Take 2.5 mg by " nebulization 4 (Four) Times a Day As Needed for Wheezing.    atorvastatin (LIPITOR) 10 MG tablet Take 1 tablet by mouth Daily.    brimonidine-timolol (COMBIGAN) 0.2-0.5 % ophthalmic solution Administer 1 drop to both eyes Every 12 (Twelve) Hours.    docusate sodium (COLACE) 100 MG capsule Take 1 capsule by mouth 2 (Two) Times a Day.    erythromycin (ROMYCIN) 5 MG/GM ophthalmic ointment Administer  to both eyes 1 (One) Time.    moxifloxacin (VIGAMOX) 0.5 % ophthalmic solution INSTILL 1 DROP IN LEFT EYE EVERY 2 HOURS AS DIRECTED    Multiple Vitamins-Minerals (PRESERVISION AREDS 2 PO) Take 1 tablet by mouth Daily.    pantoprazole (PROTONIX) 40 MG EC tablet TAKE 1 TABLET BY MOUTH TWICE DAILY    tamsulosin (FLOMAX) 0.4 MG capsule 24 hr capsule Take 1 capsule by mouth Daily.    tiotropium bromide-olodaterol (Stiolto Respimat) 2.5-2.5 MCG/ACT aerosol solution inhaler Inhale 2 puffs Daily.    [DISCONTINUED] amoxicillin (AMOXIL) 500 MG capsule Take 1 capsule by mouth 2 (Two) Times a Day.    [DISCONTINUED] Symbicort 160-4.5 MCG/ACT inhaler Inhale 2 puffs 2 (Two) Times a Day.    benzonatate (Tessalon Perles) 100 MG capsule Take 1 capsule by mouth 3 (Three) Times a Day As Needed for Cough. (Patient not taking: Reported on 4/16/2024)    Dietary Management Product (TOZAL PO) Take  by mouth. Takes 3 times daily - PT NOT SURE ABOUT THIS ONE, ON HIS MED LIST (Patient not taking: Reported on 4/16/2024)     No current facility-administered medications on file prior to visit.        Past Medical History:   Diagnosis Date    Anemia     Arthritis     Blood clot in eye 2023    Cancer     skin: BASAL CELL on face, excised- remote    Colon polyp     History of COVID-19     2020    History of transfusion     Hyperlipidemia     Macular degeneration     Upper respiratory tract infection due to COVID-19 virus 11/12/2020    Urinary retention     post-op EGD on 2/1/2022       Past Surgical History:   Procedure Laterality Date    APPENDECTOMY   195    COLONOSCOPY      ENDOSCOPY N/A 10/5/2016    Procedure: ESOPHAGOGASTRODUODENOSCOPY with biopsy;  Surgeon: Edward Gan MD;  Location: [facility] ENDOSCOPY;  Service:     ENDOSCOPY N/A 2022    Procedure: ESOPHAGOGASTRODUODENOSCOPY WITH 8MM-12MM BALLOON DILATION AND BIOPSIES;  Surgeon: Skyler Weller MD;  Location: [facility] ENDOSCOPY;  Service: Gastroenterology;  Laterality: N/A;  PRE- DYSPHAGIA  POST-  HIATAL HERNIA, ESOPHAGEAL STRICTURE    ENDOSCOPY N/A 2022    Procedure: ESOPHAGOGASTRODUODENOSCOPY WITH 15MM BALLOON PYLORIC DILATATION AND 12-18MM ESOPHAGEAL DILATATION;  Surgeon: Skyler Weller MD;  Location: [facility] ENDOSCOPY;  Service: Gastroenterology;  Laterality: N/A;  pre: dysphagia  post: esophageal stricture, pyloric stricture    EYE SURGERY      cataract b/l surgery    SKIN BIOPSY         Family History   Problem Relation Age of Onset    No Known Problems Mother     No Known Problems Father     Malig Hyperthermia Neg Hx        Social History     Socioeconomic History    Marital status:    Tobacco Use    Smoking status: Former     Current packs/day: 0.00     Average packs/day: 1.5 packs/day for 10.0 years (15.0 ttl pk-yrs)     Types: Cigarettes     Start date: 2000     Quit date:      Years since quittin.3     Passive exposure: Past    Smokeless tobacco: Never   Vaping Use    Vaping status: Never Used   Substance and Sexual Activity    Alcohol use: Yes     Alcohol/week: 21.0 standard drinks of alcohol     Types: 21 Cans of beer per week     Comment: drinks 2 beers daily    Drug use: No    Sexual activity: Defer       Review of Systems   Constitutional:  Positive for fatigue. Negative for appetite change, chills, fever, unexpected weight gain and unexpected weight loss.   HENT:  Negative for ear pain, postnasal drip, rhinorrhea, sinus pressure, sore throat and trouble swallowing.    Respiratory:  Negative for cough, chest tightness and shortness of breath.   "  Cardiovascular:  Negative for chest pain, palpitations and leg swelling.   Gastrointestinal:  Negative for abdominal pain, diarrhea, nausea and vomiting.   Endocrine: Negative for cold intolerance, heat intolerance and polydipsia (tries to drink adequate liquids).   Genitourinary:  Negative for dysuria.   Musculoskeletal:  Negative for back pain.   Skin:  Negative for rash.   Neurological:  Negative for syncope.       Objective   Vitals:    04/16/24 0912   BP: 126/72   Pulse: 103   Temp: 97.6 °F (36.4 °C)   SpO2: 99%   Weight: 52.6 kg (116 lb)   Height: 162.6 cm (64\")     Body mass index is 19.91 kg/m².    /70 sitting and 110/68 standing.      Physical Exam  Vitals and nursing note reviewed.   Constitutional:       General: He is not in acute distress.     Appearance: He is well-developed and well-groomed. He is not diaphoretic.   HENT:      Head: Normocephalic.      Right Ear: External ear normal. Right ear decreased hearing: has hearing aid. Right ear middle ear effusion: TM dull. Tympanic membrane is not erythematous.      Left Ear: External ear normal. Left ear decreased hearing: has hearing aid. Left ear middle ear effusion: yellowish fluid behind TM. Tympanic membrane is scarred. Tympanic membrane is not erythematous.      Nose: Nose normal.      Right Sinus: No maxillary sinus tenderness or frontal sinus tenderness.      Left Sinus: No maxillary sinus tenderness or frontal sinus tenderness.      Mouth/Throat:      Pharynx: No oropharyngeal exudate (mild drainage in posterior pharynx) or posterior oropharyngeal erythema.   Eyes:      Conjunctiva/sclera: Conjunctivae normal.   Neck:      Vascular: No carotid bruit.   Cardiovascular:      Rate and Rhythm: Normal rate and regular rhythm.      Pulses: Normal pulses.   Pulmonary:      Effort: Pulmonary effort is normal. No respiratory distress.      Breath sounds: Normal breath sounds. No rhonchi or rales. Decreased breath sounds: some in bilateral " bases.  Abdominal:      General: Bowel sounds are normal.      Palpations: Abdomen is soft. There is no hepatomegaly or splenomegaly.      Tenderness: There is no abdominal tenderness. There is no guarding.   Musculoskeletal:      Cervical back: Normal range of motion and neck supple.      Right lower leg: No edema.      Left lower leg: No edema.   Lymphadenopathy:      Cervical: No cervical adenopathy.   Skin:     General: Skin is warm and dry.      Findings: No rash.   Neurological:      Mental Status: He is alert and oriented to person, place, and time.      Cranial Nerves: Cranial nerves 2-12 are intact.      Gait: Abnormal gait: guarded gait.      Comments: Negative [place]-Hallpike test bilaterally   Psychiatric:         Mood and Affect: Mood normal.         Behavior: Behavior normal.         Thought Content: Thought content normal.         Cognition and Memory: Cognition normal.         Judgment: Judgment normal.     10/2/23 CMP WNL except creat 1.54, eGFR 43; CBC WNL except Hgb 11.9, Hct 35.9     Lab Results   Component Value Date    CHLPL 166 09/29/2022    TRIG 72 09/29/2022    HDL 77 (H) 09/29/2022    VLDL 14 09/29/2022    LDL 75 09/29/2022     Lab Results   Component Value Date    RBCUA 0-2 06/21/2019    BACTERIA None Seen 06/21/2019    LABPH 5.5 05/07/2015    COLORU Yellow 04/16/2024    CLARITYU Clear 04/16/2024    LEUKOCYTESUR Negative 04/16/2024    GLUCOSEU Negative 06/21/2019    BLOODU Negative 06/21/2019    BILIRUBINUR Negative 04/16/2024    NITRITEU Negative 06/21/2019          Assessment    Problem List Items Addressed This Visit       GERD (gastroesophageal reflux disease)    Current Assessment & Plan     Stable.  Continue Pantoprazole daily.         BPPV (benign paroxysmal positional vertigo)    Relevant Medications    meclizine (ANTIVERT) 25 MG tablet    Other Relevant Orders    Ambulatory Referral to Physical Therapy Vestibular    Dizziness - Primary    Current Assessment & Plan     Continue  increased intake of clear liquids and rest.  Continue to change positions slowly.         Relevant Orders    Iron (Completed)    CBC & Differential (Completed)    Comprehensive Metabolic Panel (Completed)    Ferritin (Completed)    TSH Rfx On Abnormal To Free T4 (Completed)    POC Urinalysis Dipstick, Automated (Completed)    Left otitis media    Relevant Medications    amoxicillin (AMOXIL) 500 MG capsule    Anemia    Relevant Medications    Ferrous Sulfate  (45 Fe) MG tablet controlled-release    Other Relevant Orders    Iron (Completed)    CBC & Differential (Completed)    Ferritin (Completed)        Return if symptoms worsen or fail to improve.  Patient states symptoms similar to when has had BPPV in past and would like referral for vestibular therapy; pt has tried Epley maneuvers at home and have seemed to help some; will refer to PT as requested; however, also discussed symptoms may be related to otitis media or another cause; will treat OM with Amoxicillin and will check labs for further evaluation.

## 2024-04-16 NOTE — PATIENT INSTRUCTIONS
Continue increased intake of clear liquids and rest.  Continue to change positions slowly.  Follow up pending lab results.  Follow up if symptoms persist or worsen.

## 2024-04-17 DIAGNOSIS — D64.9 ANEMIA, UNSPECIFIED TYPE: Primary | ICD-10-CM

## 2024-04-17 DIAGNOSIS — I10 PRIMARY HYPERTENSION: ICD-10-CM

## 2024-04-17 DIAGNOSIS — N18.32 STAGE 3B CHRONIC KIDNEY DISEASE: ICD-10-CM

## 2024-04-17 PROBLEM — U07.1 UPPER RESPIRATORY TRACT INFECTION DUE TO COVID-19 VIRUS: Status: RESOLVED | Noted: 2020-11-12 | Resolved: 2024-04-17

## 2024-04-17 PROBLEM — J06.9 UPPER RESPIRATORY TRACT INFECTION DUE TO COVID-19 VIRUS: Status: RESOLVED | Noted: 2020-11-12 | Resolved: 2024-04-17

## 2024-04-17 LAB
ALBUMIN SERPL-MCNC: 4.4 G/DL (ref 3.5–5.2)
ALBUMIN/GLOB SERPL: 1.4 G/DL
ALP SERPL-CCNC: 134 U/L (ref 39–117)
ALT SERPL-CCNC: 15 U/L (ref 1–41)
AST SERPL-CCNC: 22 U/L (ref 1–40)
BASOPHILS # BLD AUTO: 0.06 10*3/MM3 (ref 0–0.2)
BASOPHILS NFR BLD AUTO: 0.5 % (ref 0–1.5)
BILIRUB SERPL-MCNC: 0.3 MG/DL (ref 0–1.2)
BUN SERPL-MCNC: 18 MG/DL (ref 8–23)
BUN/CREAT SERPL: 10.8 (ref 7–25)
CALCIUM SERPL-MCNC: 9.5 MG/DL (ref 8.2–9.6)
CHLORIDE SERPL-SCNC: 102 MMOL/L (ref 98–107)
CO2 SERPL-SCNC: 23.3 MMOL/L (ref 22–29)
CREAT SERPL-MCNC: 1.67 MG/DL (ref 0.76–1.27)
EGFRCR SERPLBLD CKD-EPI 2021: 38.4 ML/MIN/1.73
EOSINOPHIL # BLD AUTO: 0.21 10*3/MM3 (ref 0–0.4)
EOSINOPHIL NFR BLD AUTO: 1.8 % (ref 0.3–6.2)
ERYTHROCYTE [DISTWIDTH] IN BLOOD BY AUTOMATED COUNT: 14.5 % (ref 12.3–15.4)
FERRITIN SERPL-MCNC: 63.8 NG/ML (ref 30–400)
GLOBULIN SER CALC-MCNC: 3.1 GM/DL
GLUCOSE SERPL-MCNC: 111 MG/DL (ref 65–99)
HCT VFR BLD AUTO: 36.1 % (ref 37.5–51)
HGB BLD-MCNC: 11.9 G/DL (ref 13–17.7)
IMM GRANULOCYTES # BLD AUTO: 0.04 10*3/MM3 (ref 0–0.05)
IMM GRANULOCYTES NFR BLD AUTO: 0.3 % (ref 0–0.5)
IRON SERPL-MCNC: 50 MCG/DL (ref 59–158)
LYMPHOCYTES # BLD AUTO: 1.91 10*3/MM3 (ref 0.7–3.1)
LYMPHOCYTES NFR BLD AUTO: 16.6 % (ref 19.6–45.3)
MCH RBC QN AUTO: 30.2 PG (ref 26.6–33)
MCHC RBC AUTO-ENTMCNC: 33 G/DL (ref 31.5–35.7)
MCV RBC AUTO: 91.6 FL (ref 79–97)
MONOCYTES # BLD AUTO: 1.38 10*3/MM3 (ref 0.1–0.9)
MONOCYTES NFR BLD AUTO: 12 % (ref 5–12)
NEUTROPHILS # BLD AUTO: 7.93 10*3/MM3 (ref 1.7–7)
NEUTROPHILS NFR BLD AUTO: 68.8 % (ref 42.7–76)
NRBC BLD AUTO-RTO: 0 /100 WBC (ref 0–0.2)
PLATELET # BLD AUTO: 273 10*3/MM3 (ref 140–450)
POTASSIUM SERPL-SCNC: 5.1 MMOL/L (ref 3.5–5.2)
PROT SERPL-MCNC: 7.5 G/DL (ref 6–8.5)
RBC # BLD AUTO: 3.94 10*6/MM3 (ref 4.14–5.8)
SODIUM SERPL-SCNC: 140 MMOL/L (ref 136–145)
T4 FREE SERPL-MCNC: 1.37 NG/DL (ref 0.93–1.7)
TSH SERPL DL<=0.005 MIU/L-ACNC: 10.1 UIU/ML (ref 0.27–4.2)
WBC # BLD AUTO: 11.53 10*3/MM3 (ref 3.4–10.8)

## 2024-04-17 RX ORDER — AMOXICILLIN 500 MG/1
500 CAPSULE ORAL 3 TIMES DAILY
Qty: 21 CAPSULE | Refills: 0 | Status: SHIPPED | OUTPATIENT
Start: 2024-04-17 | End: 2024-04-24

## 2024-05-02 DIAGNOSIS — R79.89 ELEVATED TSH: ICD-10-CM

## 2024-05-02 DIAGNOSIS — N18.32 STAGE 3B CHRONIC KIDNEY DISEASE: ICD-10-CM

## 2024-05-02 DIAGNOSIS — I10 PRIMARY HYPERTENSION: ICD-10-CM

## 2024-05-02 DIAGNOSIS — E87.5 HYPERKALEMIA: Primary | ICD-10-CM

## 2024-07-31 DIAGNOSIS — E87.5 HYPERKALEMIA: Primary | ICD-10-CM

## 2024-07-31 DIAGNOSIS — R94.6 ABNORMAL RESULTS OF THYROID FUNCTION STUDIES: ICD-10-CM

## 2024-07-31 DIAGNOSIS — R79.89 ELEVATED TSH: ICD-10-CM

## 2024-08-06 ENCOUNTER — TELEPHONE (OUTPATIENT)
Dept: FAMILY MEDICINE CLINIC | Facility: CLINIC | Age: 89
End: 2024-08-06
Payer: MEDICARE

## 2024-08-06 DIAGNOSIS — R79.89 ELEVATED TSH: ICD-10-CM

## 2024-08-06 DIAGNOSIS — J44.9 CHRONIC OBSTRUCTIVE PULMONARY DISEASE, UNSPECIFIED COPD TYPE: ICD-10-CM

## 2024-08-06 DIAGNOSIS — E87.5 HYPERKALEMIA: ICD-10-CM

## 2024-08-06 DIAGNOSIS — R94.6 ABNORMAL RESULTS OF THYROID FUNCTION STUDIES: ICD-10-CM

## 2024-08-06 RX ORDER — TIOTROPIUM BROMIDE AND OLODATEROL 3.124; 2.736 UG/1; UG/1
2 SPRAY, METERED RESPIRATORY (INHALATION) DAILY
Qty: 4 G | Refills: 5 | Status: SHIPPED | OUTPATIENT
Start: 2024-08-06 | End: 2024-08-07 | Stop reason: SDUPTHER

## 2024-08-07 ENCOUNTER — TELEPHONE (OUTPATIENT)
Dept: FAMILY MEDICINE CLINIC | Facility: CLINIC | Age: 89
End: 2024-08-07
Payer: MEDICARE

## 2024-08-07 DIAGNOSIS — J44.9 CHRONIC OBSTRUCTIVE PULMONARY DISEASE, UNSPECIFIED COPD TYPE: ICD-10-CM

## 2024-08-07 DIAGNOSIS — J44.9 CHRONIC OBSTRUCTIVE PULMONARY DISEASE, UNSPECIFIED COPD TYPE: Primary | ICD-10-CM

## 2024-08-07 LAB
BUN SERPL-MCNC: 26 MG/DL (ref 8–23)
BUN/CREAT SERPL: 15.1 (ref 7–25)
CALCIUM SERPL-MCNC: 9.4 MG/DL (ref 8.2–9.6)
CHLORIDE SERPL-SCNC: 100 MMOL/L (ref 98–107)
CO2 SERPL-SCNC: 23 MMOL/L (ref 22–29)
CREAT SERPL-MCNC: 1.72 MG/DL (ref 0.76–1.27)
EGFRCR SERPLBLD CKD-EPI 2021: 37.1 ML/MIN/1.73
GLUCOSE SERPL-MCNC: 126 MG/DL (ref 65–99)
POTASSIUM SERPL-SCNC: 5.7 MMOL/L (ref 3.5–5.2)
SODIUM SERPL-SCNC: 136 MMOL/L (ref 136–145)
T4 FREE SERPL-MCNC: 1.41 NG/DL (ref 0.93–1.7)
TSH SERPL DL<=0.005 MIU/L-ACNC: 9.9 UIU/ML (ref 0.27–4.2)

## 2024-08-07 RX ORDER — TIOTROPIUM BROMIDE AND OLODATEROL 3.124; 2.736 UG/1; UG/1
2 SPRAY, METERED RESPIRATORY (INHALATION) DAILY
Qty: 4 G | Refills: 5 | Status: SHIPPED | OUTPATIENT
Start: 2024-08-07

## 2024-08-07 RX ORDER — ALBUTEROL SULFATE 90 UG/1
2 AEROSOL, METERED RESPIRATORY (INHALATION) EVERY 4 HOURS PRN
Qty: 18 G | Refills: 1 | Status: SHIPPED | OUTPATIENT
Start: 2024-08-07

## 2024-08-07 NOTE — TELEPHONE ENCOUNTER
I called and spoke with the patient and asked him which inhaler he needed refilled and he said he thought it was the albuterol inhaler. I let him know since we were not informed specifics that the stiolto was sent in but I let him know Dr Feliz said he can send in the albuterol for him. He verbalized understanding and will call back if needed.

## 2024-08-07 NOTE — TELEPHONE ENCOUNTER
Caller: Dulce Vaughan    Relationship: Emergency Contact    Best call back number: [phone]     Requested Prescriptions:   Requested Prescriptions     Pending Prescriptions Disp Refills    tiotropium bromide-olodaterol (Stiolto Respimat) 2.5-2.5 MCG/ACT aerosol solution inhaler 4 g 5     Sig: Inhale 2 puffs Daily.        Pharmacy where request should be sent: [facility] [id number] [facility] [address] AT [facility] [address] [place] [phone] [facility] [phone]      Additional details provided by patient: PATIENTS DAUGHTER CALLING STATING THAT THIS MEDICATION IS NEEDING TO BE SENT TO [facility] INSTEAD OF Andrey Benoit   08/07/24 08:28 EDT

## 2024-08-08 DIAGNOSIS — D64.9 ANEMIA, UNSPECIFIED TYPE: ICD-10-CM

## 2024-08-08 DIAGNOSIS — E87.5 HYPERKALEMIA: ICD-10-CM

## 2024-08-08 DIAGNOSIS — E03.9 HYPOTHYROIDISM, UNSPECIFIED TYPE: Primary | ICD-10-CM

## 2024-08-08 RX ORDER — LEVOTHYROXINE SODIUM 0.03 MG/1
25 TABLET ORAL
Qty: 30 TABLET | Refills: 2 | Status: SHIPPED | OUTPATIENT
Start: 2024-08-08

## 2024-08-12 ENCOUNTER — TELEPHONE (OUTPATIENT)
Dept: FAMILY MEDICINE CLINIC | Facility: CLINIC | Age: 89
End: 2024-08-12

## 2024-08-12 DIAGNOSIS — N40.0 BENIGN PROSTATIC HYPERPLASIA WITHOUT LOWER URINARY TRACT SYMPTOMS: ICD-10-CM

## 2024-08-12 DIAGNOSIS — E78.2 MIXED HYPERLIPIDEMIA: ICD-10-CM

## 2024-08-12 RX ORDER — ATORVASTATIN CALCIUM 10 MG/1
10 TABLET, FILM COATED ORAL DAILY
Qty: 90 TABLET | Refills: 1 | Status: SHIPPED | OUTPATIENT
Start: 2024-08-12

## 2024-08-12 RX ORDER — TAMSULOSIN HYDROCHLORIDE 0.4 MG/1
1 CAPSULE ORAL DAILY
Qty: 90 CAPSULE | Refills: 1 | Status: SHIPPED | OUTPATIENT
Start: 2024-08-12

## 2024-08-12 NOTE — TELEPHONE ENCOUNTER
LOV                   10/2/2023 [facility]  NOV                   10/3/2024 [facility]  Last RF               10/2/23      PROTOCOL       not met    Cinthya VILLAVICENCIO, NRCMA/LMR

## 2024-08-12 NOTE — TELEPHONE ENCOUNTER
Needs refill atorvastatin and flomax. Will send refill request for these 2 medications    Pt has been taking 2 flomax daily instead, will this hurt him in any way?

## 2024-08-12 NOTE — TELEPHONE ENCOUNTER
Caller: Dulce Vaughan    Relationship: Emergency Contact    Best call back number: [phone]       What was the call regarding: PATIENT'S DAUGHTER IS CALLING TO GET CLARIFICATION ON MEDICATIONS. PATIENT BELIEVES HE IS SUPPOSE TO BE TAKING 2 FLOMAX INSTEAD OF ONE. THEY ARE ALSO NEEDING TO KNOW IF HE IS NOW ON THYROID MEDICATION. PLEASE ADVISE.

## 2024-08-12 NOTE — TELEPHONE ENCOUNTER
Caller: Dulce Vaughan    Relationship: Emergency Contact    Best call back number:     [phone]       Requested Prescriptions:   Requested Prescriptions     Pending Prescriptions Disp Refills    atorvastatin (LIPITOR) 10 MG tablet 90 tablet 3     Sig: Take 1 tablet by mouth Daily.    tamsulosin (FLOMAX) 0.4 MG capsule 24 hr capsule 90 capsule 3     Sig: Take 1 capsule by mouth Daily.        Pharmacy where request should be sent: [facility] [id number] [address] AT [facility] [address] [facility] [phone] [facility] [phone]      Last office visit with prescribing clinician: 10/2/2023   Last telemedicine visit with prescribing clinician: Visit date not found   Next office visit with prescribing clinician: Visit date not found     Additional details provided by patient:     Does the patient have less than a 3 day supply:  [x] Yes  [] No    Would you like a call back once the refill request has been completed: [] Yes [x] No    If the office needs to give you a call back, can they leave a voicemail: [] Yes [x] No    Andrey Caicedo Rep   08/12/24 11:54 EDT

## 2024-08-20 DIAGNOSIS — D64.9 ANEMIA, UNSPECIFIED TYPE: Primary | ICD-10-CM

## 2024-08-20 DIAGNOSIS — N40.0 BENIGN PROSTATIC HYPERPLASIA WITHOUT LOWER URINARY TRACT SYMPTOMS: ICD-10-CM

## 2024-08-20 RX ORDER — TAMSULOSIN HYDROCHLORIDE 0.4 MG/1
1 CAPSULE ORAL DAILY
Qty: 90 CAPSULE | Refills: 1 | Status: SHIPPED | OUTPATIENT
Start: 2024-08-20

## 2024-08-22 ENCOUNTER — CLINICAL SUPPORT (OUTPATIENT)
Dept: FAMILY MEDICINE CLINIC | Facility: CLINIC | Age: 89
End: 2024-08-22
Payer: MEDICARE

## 2024-08-22 DIAGNOSIS — D64.9 ANEMIA, UNSPECIFIED TYPE: ICD-10-CM

## 2024-08-22 LAB
DEVELOPER EXPIRATION DATE: NORMAL
DEVELOPER LOT NUMBER: NORMAL
EXPIRATION DATE: NORMAL
FECAL OCCULT BLOOD SCREEN, POC: NEGATIVE
Lab: NORMAL
NEGATIVE CONTROL: NEGATIVE
POSITIVE CONTROL: POSITIVE

## 2024-08-22 PROCEDURE — 82270 OCCULT BLOOD FECES: CPT | Performed by: NURSE PRACTITIONER

## 2024-10-03 ENCOUNTER — OFFICE VISIT (OUTPATIENT)
Dept: FAMILY MEDICINE CLINIC | Facility: CLINIC | Age: 89
End: 2024-10-03
Payer: MEDICARE

## 2024-10-03 VITALS
HEART RATE: 98 BPM | DIASTOLIC BLOOD PRESSURE: 74 MMHG | WEIGHT: 106 LBS | TEMPERATURE: 97.6 F | SYSTOLIC BLOOD PRESSURE: 134 MMHG | OXYGEN SATURATION: 100 % | HEIGHT: 64 IN | BODY MASS INDEX: 18.1 KG/M2

## 2024-10-03 DIAGNOSIS — E78.2 MIXED HYPERLIPIDEMIA: ICD-10-CM

## 2024-10-03 DIAGNOSIS — N18.30 STAGE 3 CHRONIC KIDNEY DISEASE, UNSPECIFIED WHETHER STAGE 3A OR 3B CKD: ICD-10-CM

## 2024-10-03 DIAGNOSIS — Z00.00 MEDICARE ANNUAL WELLNESS VISIT, SUBSEQUENT: Primary | ICD-10-CM

## 2024-10-03 DIAGNOSIS — D50.9 IRON DEFICIENCY ANEMIA, UNSPECIFIED IRON DEFICIENCY ANEMIA TYPE: ICD-10-CM

## 2024-10-03 DIAGNOSIS — I10 PRIMARY HYPERTENSION: ICD-10-CM

## 2024-10-03 PROBLEM — H66.92 LEFT OTITIS MEDIA: Status: RESOLVED | Noted: 2020-11-03 | Resolved: 2024-10-03

## 2024-10-03 NOTE — PATIENT INSTRUCTIONS
Medicare Wellness  Personal Prevention Plan of Service     Date of Office Visit:    Encounter Provider:  Adam Feliz MD  Place of Service:  [facility]  Patient Name: Louis Villalba  :  10/20/1932    As part of the Medicare Wellness portion of your visit today, we are providing you with this personalized preventive plan of services (PPPS). This plan is based upon recommendations of the [place] Preventive Services Task Force (USPSTF) and the Advisory Committee on Immunization Practices (ACIP).    This lists the preventive care services that should be considered, and provides dates of when you are due. Items listed as completed are up-to-date and do not require any further intervention.    Health Maintenance   Topic Date Due    TDAP/TD VACCINES (1 - Tdap) Never done    LIPID PANEL  2023    COVID-19 Vaccine (2023-24 season) 2025    BMI FOLLOWUP  2025    ANNUAL WELLNESS VISIT  10/03/2025    RSV Vaccine - Adults  Completed    INFLUENZA VACCINE  Completed    Pneumococcal Vaccine 65+  Completed    ZOSTER VACCINE  Completed       Orders Placed This Encounter   Procedures    Comprehensive Metabolic Panel     Order Specific Question:   Release to patient     Answer:   Routine Release [[id number]]    Lipid Panel     Order Specific Question:   Release to patient     Answer:   Routine Release [[id number]]    Iron and TIBC     Order Specific Question:   Release to patient     Answer:   Routine Release [[id number]]    Ferritin     Order Specific Question:   Release to patient     Answer:   Routine Release [[id number]]    CBC & Differential     Order Specific Question:   Manual Differential     Answer:   No     Order Specific Question:   Release to patient     Answer:   Routine Release [[id number]]       Return in about 6 months (around 4/3/2025) for Next scheduled follow up.

## 2024-10-03 NOTE — PROGRESS NOTES
Subjective   The ABCs of the Annual Wellness Visit  Medicare Wellness Visit      Louis Villalba is a 89 y.o. patient who presents for a Medicare Wellness Visit.  Sister was present during the history-taking and subsequent discussion (and for part of the physical exam) with this patient.  Patient agrees to the presence of the individual during this visit.    89-year-old male with history of hypertension, hyperlipidemia, stage III chronic kidney disease, BPH, COPD, GERD, history of gastric outlet obstruction, esophageal stricture, macular degeneration with legal blindness.    The following portions of the patient's history were reviewed and updated as appropriate: allergies, current medications, past family history, past medical history, past social history, past surgical history, and problem list.  Compared to one year ago, the patient's physical health is the same.  Compared to one year ago, the patient's mental health is the same.    Recent Hospitalizations:  He was not admitted to the hospital during the last year.     Current Medical Providers:  Patient Care Team:  Adam Feliz MD as PCP - General (Internal Medicine)  Joe Davis OD (Optometry)  Stef Claire MD as Consulting Physician (Ophthalmology)  Eric Nuñez MD as Consulting Physician (Nephrology)  Hilton Chavarria MD as Consulting Physician (Pulmonary Disease)  Skyler Weller MD as Consulting Physician (Gastroenterology)    Outpatient Medications Prior to Visit   Medication Sig Dispense Refill    acyclovir (ZOVIRAX) 800 MG tablet Take 1 tablet by mouth 3 times a day.      albuterol (PROVENTIL) (2.5 MG/3ML) 0.083% nebulizer solution Take 2.5 mg by nebulization 4 (Four) Times a Day As Needed for Wheezing. 125 vial 2    albuterol sulfate  (90 Base) MCG/ACT inhaler Inhale 2 puffs Every 4 (Four) Hours As Needed for Wheezing. 18 g 1    atorvastatin (LIPITOR) 10 MG tablet Take 1 tablet by mouth Daily. 90 tablet 1     brimonidine-timolol (COMBIGAN) 0.2-0.5 % ophthalmic solution Administer 1 drop to both eyes Every 12 (Twelve) Hours.      docusate sodium (COLACE) 100 MG capsule Take 1 capsule by mouth 2 (Two) Times a Day.      erythromycin (ROMYCIN) 5 MG/GM ophthalmic ointment Administer  to both eyes 1 (One) Time.      Ferrous Sulfate  (45 Fe) MG tablet controlled-release Take 1 tablet by mouth Daily. 30 tablet 2    levothyroxine (SYNTHROID, LEVOTHROID) 25 MCG tablet Take 1 tablet by mouth Every Morning. Take on empty stomach 30 tablet 2    meclizine (ANTIVERT) 25 MG tablet Take 1 tablet by mouth 3 (Three) Times a Day As Needed for Dizziness. 60 tablet 1    moxifloxacin (VIGAMOX) 0.5 % ophthalmic solution INSTILL 1 DROP IN LEFT EYE EVERY 2 HOURS AS DIRECTED      Multiple Vitamins-Minerals (PRESERVISION AREDS 2 PO) Take 1 tablet by mouth Daily.      pantoprazole (PROTONIX) 40 MG EC tablet TAKE 1 TABLET BY MOUTH TWICE DAILY 180 tablet 3    tamsulosin (FLOMAX) 0.4 MG capsule 24 hr capsule Take 1 capsule by mouth Daily. 90 capsule 1    tiotropium bromide-olodaterol (Stiolto Respimat) 2.5-2.5 MCG/ACT aerosol solution inhaler Inhale 2 puffs Daily. 4 g 5    benzonatate (Tessalon Perles) 100 MG capsule Take 1 capsule by mouth 3 (Three) Times a Day As Needed for Cough. 30 capsule 0    Dietary Management Product (TOZAL PO) Take  by mouth. Takes 3 times daily - PT NOT SURE ABOUT THIS ONE, ON HIS MED LIST (Patient not taking: Reported on 4/16/2024)       No facility-administered medications prior to visit.     No opioid medication identified on active medication list. I have reviewed chart for other potential  high risk medication/s and harmful drug interactions in the elderly.      Aspirin is not on active medication list.  Aspirin use is not indicated based on review of current medical condition/s. Risk of harm outweighs potential benefits.  .    Patient Active Problem List   Diagnosis    Gastric outlet obstruction    COPD (chronic  "obstructive pulmonary disease)    HTN (hypertension)    Lung nodule    CKD (chronic kidney disease) stage 3, GFR 30-59 ml/min    Benign prostatic hyperplasia without lower urinary tract symptoms    Hyperkalemia    Hyperlipidemia    GERD (gastroesophageal reflux disease)    Medicare annual wellness visit, subsequent    BPPV (benign paroxysmal positional vertigo)    Dizziness    Shortness of breath    Elevated liver function tests    Other dysphagia    History of esophageal stricture    Macular degeneration    Anemia    Iron deficiency anemia     Advance Care Planning Advance Directive is on file.  ACP discussion was held with the patient during this visit. Patient has an advance directive in EMR which is still valid.       Objective   Vitals:    10/03/24 1528 10/03/24 1611   BP: 160/84 134/74   BP Location: Right arm Left arm   Patient Position: Sitting Sitting   Cuff Size: Large Adult Adult   Pulse: 98    Temp: 97.6 °F (36.4 °C)    TempSrc: Temporal    SpO2: 100%    Weight: 48.1 kg (106 lb)    Height: 162.6 cm (64\")    PainSc: 0-No pain      Estimated body mass index is 18.19 kg/m² as calculated from the following:    Height as of this encounter: 162.6 cm (64\").    Weight as of this encounter: 48.1 kg (106 lb).    BMI is below normal parameters (malnutrition). Recommendations: none (medical contraindication)     Does the patient have evidence of cognitive impairment? No                                                                                           Health  Risk Assessment    Smoking Status:  Social History     Tobacco Use   Smoking Status Former    Current packs/day: 0.00    Average packs/day: 1.5 packs/day for 10.0 years (15.0 ttl pk-yrs)    Types: Cigarettes    Start date: 2000    Quit date: 2010    Years since quittin.7    Passive exposure: Past   Smokeless Tobacco Never     Alcohol Consumption:  Social History     Substance and Sexual Activity   Alcohol Use Yes    Alcohol/week: 21.0 standard " drinks of alcohol    Types: 21 Cans of beer per week    Comment: drinks 2 beers daily       Fall Risk Screen  ANDREIA Fall Risk Assessment was completed, and patient is at LOW risk for falls.Assessment completed on:10/3/2024    Depression Screening:      10/3/2024     3:32 PM   PHQ-2/PHQ-9 Depression Screening   Little Interest or Pleasure in Doing Things 0-->not at all   Feeling Down, Depressed or Hopeless 0-->not at all   PHQ-9: Brief Depression Severity Measure Score 0     Health Habits and Functional and Cognitive Screening:      10/3/2024     3:32 PM   Functional & Cognitive Status   Do you have difficulty preparing food and eating? No   Do you have difficulty bathing yourself, getting dressed or grooming yourself? No   Do you have difficulty using the toilet? No   Do you have difficulty moving around from place to place? Yes   Do you have trouble with steps or getting out of a bed or a chair? Yes   Current Diet Well Balanced Diet   Dental Exam Up to date   Eye Exam Up to date   Exercise (times per week) 0 times per week   Current Exercises Include No Regular Exercise   Do you need help using the phone?  Yes   Are you deaf or do you have serious difficulty hearing?  Yes   Do you need help to go to places out of walking distance? Yes   Do you need help shopping? Yes   Do you need help preparing meals?  No   Do you need help with housework?  Yes   Do you need help with laundry? Yes   Do you need help taking your medications? Yes   Do you need help managing money? Yes   Do you ever drive or ride in a car without wearing a seat belt? No   Have you felt unusual stress, anger or loneliness in the last month? No   Who do you live with? Alone   If you need help, do you have trouble finding someone available to you? No   Have you been bothered in the last four weeks by sexual problems? No   Do you have difficulty concentrating, remembering or making decisions? No           Age-appropriate Screening Schedule:  Refer to  the list below for future screening recommendations based on patient's age, sex and/or medical conditions. Orders for these recommended tests are listed in the plan section. The patient has been provided with a written plan.    Health Maintenance List  Health Maintenance   Topic Date Due    TDAP/TD VACCINES (1 - Tdap) Never done    LIPID PANEL  09/29/2023    COVID-19 Vaccine (7 - 2023-24 season) 01/08/2025    BMI FOLLOWUP  04/16/2025    ANNUAL WELLNESS VISIT  10/03/2025    RSV Vaccine - Adults  Completed    INFLUENZA VACCINE  Completed    Pneumococcal Vaccine 65+  Completed    ZOSTER VACCINE  Completed                                                                                                                                                CMS Preventative Services Quick Reference  Risk Factors Identified During Encounter  Immunizations Discussed/Encouraged: Tdap  Diagnoses and all orders for this visit:    1. Medicare annual wellness visit, subsequent (Primary)    2. Stage 3 chronic kidney disease, unspecified whether stage 3a or 3b CKD  Assessment & Plan:      Orders:  -     Comprehensive Metabolic Panel    3. Mixed hyperlipidemia  Assessment & Plan:       Orders:  -     Comprehensive Metabolic Panel  -     Lipid Panel    4. Primary hypertension  Assessment & Plan:      Orders:  -     Comprehensive Metabolic Panel  -     Lipid Panel  -     CBC & Differential    5. Iron deficiency anemia, unspecified iron deficiency anemia type  -     CBC & Differential  -     Iron and TIBC  -     Ferritin      Annual wellness visit reviewed with patient.  All past history, medications, social history, and problem list were reviewed.  Discussed advanced directives and living will.  Patient has living will: Living will: Directive already scanned in chart.  Discussed fall risk and precautions encourage removing throw rugs and using grab bars within the home and bathroom.  Will check the labs as ordered above to evaluate the blood  sugars, kidney, liver, cholesterol for screening.  Discussed flu shot recommended to get the high-dose influenza vaccine annually in the fall.  The patient has started, but not completed, their COVID-19 vaccination series.  RSV, influenza, Shingrix, Prevnar-13 and pneumovax-23 up to date and appropriate.  Tdap discussed and recommended is only covered by Medicare at the pharmacy and patient is understanding.  Encourage follow-up with the eye doctor on annual basis for glaucoma evaluation.  Discussed weight and encouraged exercise as tolerated while following a healthy diet.  Noted patient weight is underweight I did recommend increased dietary intake of calories and appropriate diet.  Follow up with current specialists as needed.    The above risks/problems have been discussed with the patient.  Pertinent information has been shared with the patient in the After Visit Summary.  An After Visit Summary and PPPS were made available to the patient.    Follow Up:   Next Medicare Wellness visit to be scheduled in 1 year.

## 2024-10-04 LAB
ALBUMIN SERPL-MCNC: 4.4 G/DL (ref 3.6–4.6)
ALP SERPL-CCNC: 183 IU/L (ref 44–121)
ALT SERPL-CCNC: 9 IU/L (ref 0–44)
AST SERPL-CCNC: 17 IU/L (ref 0–40)
BASOPHILS # BLD AUTO: 0.1 X10E3/UL (ref 0–0.2)
BASOPHILS NFR BLD AUTO: 1 %
BILIRUB SERPL-MCNC: 0.2 MG/DL (ref 0–1.2)
BUN SERPL-MCNC: 21 MG/DL (ref 10–36)
BUN/CREAT SERPL: 12 (ref 10–24)
CALCIUM SERPL-MCNC: 9.4 MG/DL (ref 8.6–10.2)
CHLORIDE SERPL-SCNC: 101 MMOL/L (ref 96–106)
CHOLEST SERPL-MCNC: 152 MG/DL (ref 100–199)
CO2 SERPL-SCNC: 20 MMOL/L (ref 20–29)
CREAT SERPL-MCNC: 1.73 MG/DL (ref 0.76–1.27)
EGFRCR SERPLBLD CKD-EPI 2021: 37 ML/MIN/1.73
EOSINOPHIL # BLD AUTO: 0.3 X10E3/UL (ref 0–0.4)
EOSINOPHIL NFR BLD AUTO: 3 %
ERYTHROCYTE [DISTWIDTH] IN BLOOD BY AUTOMATED COUNT: 12.5 % (ref 11.6–15.4)
FERRITIN SERPL-MCNC: 150 NG/ML (ref 30–400)
GLOBULIN SER CALC-MCNC: 3.4 G/DL (ref 1.5–4.5)
GLUCOSE SERPL-MCNC: 118 MG/DL (ref 70–99)
HCT VFR BLD AUTO: 34.2 % (ref 37.5–51)
HDLC SERPL-MCNC: 64 MG/DL
HGB BLD-MCNC: 10.5 G/DL (ref 13–17.7)
IMM GRANULOCYTES # BLD AUTO: 0 X10E3/UL (ref 0–0.1)
IMM GRANULOCYTES NFR BLD AUTO: 0 %
IRON SATN MFR SERPL: 8 % (ref 15–55)
IRON SERPL-MCNC: 23 UG/DL (ref 38–169)
LDLC SERPL CALC-MCNC: 74 MG/DL (ref 0–99)
LYMPHOCYTES # BLD AUTO: 1.8 X10E3/UL (ref 0.7–3.1)
LYMPHOCYTES NFR BLD AUTO: 19 %
MCH RBC QN AUTO: 28.6 PG (ref 26.6–33)
MCHC RBC AUTO-ENTMCNC: 30.7 G/DL (ref 31.5–35.7)
MCV RBC AUTO: 93 FL (ref 79–97)
MONOCYTES # BLD AUTO: 1.2 X10E3/UL (ref 0.1–0.9)
MONOCYTES NFR BLD AUTO: 13 %
NEUTROPHILS # BLD AUTO: 5.9 X10E3/UL (ref 1.4–7)
NEUTROPHILS NFR BLD AUTO: 64 %
PLATELET # BLD AUTO: 305 X10E3/UL (ref 150–450)
POTASSIUM SERPL-SCNC: 6.2 MMOL/L (ref 3.5–5.2)
PROT SERPL-MCNC: 7.8 G/DL (ref 6–8.5)
RBC # BLD AUTO: 3.67 X10E6/UL (ref 4.14–5.8)
SODIUM SERPL-SCNC: 136 MMOL/L (ref 134–144)
TIBC SERPL-MCNC: 274 UG/DL (ref 250–450)
TRIGL SERPL-MCNC: 70 MG/DL (ref 0–149)
UIBC SERPL-MCNC: 251 UG/DL (ref 111–343)
VLDLC SERPL CALC-MCNC: 14 MG/DL (ref 5–40)
WBC # BLD AUTO: 9.3 X10E3/UL (ref 3.4–10.8)

## 2024-10-06 DIAGNOSIS — E87.5 HYPERKALEMIA: Primary | ICD-10-CM

## 2024-10-09 ENCOUNTER — TELEPHONE (OUTPATIENT)
Dept: FAMILY MEDICINE CLINIC | Facility: CLINIC | Age: 89
End: 2024-10-09

## 2024-10-09 ENCOUNTER — HOSPITAL ENCOUNTER (EMERGENCY)
Facility: HOSPITAL | Age: 89
Discharge: HOME | End: 2024-10-09
Attending: STUDENT IN AN ORGANIZED HEALTH CARE EDUCATION/TRAINING PROGRAM
Payer: MEDICARE

## 2024-10-09 ENCOUNTER — APPOINTMENT (OUTPATIENT)
Dept: CT IMAGING | Facility: HOSPITAL | Age: 89
End: 2024-10-09
Payer: MEDICARE

## 2024-10-09 VITALS
OXYGEN SATURATION: 96 % | TEMPERATURE: 96.9 F | RESPIRATION RATE: 14 BRPM | HEART RATE: 91 BPM | DIASTOLIC BLOOD PRESSURE: 69 MMHG | SYSTOLIC BLOOD PRESSURE: 126 MMHG

## 2024-10-09 DIAGNOSIS — E87.5 HYPERKALEMIA: Primary | ICD-10-CM

## 2024-10-09 DIAGNOSIS — N18.32 STAGE 3B CHRONIC KIDNEY DISEASE: ICD-10-CM

## 2024-10-09 DIAGNOSIS — R91.8 RIGHT LOWER LOBE LUNG MASS: Primary | ICD-10-CM

## 2024-10-09 LAB
ALBUMIN SERPL-MCNC: 3.7 G/DL (ref 3.5–5.2)
ALBUMIN/GLOB SERPL: 1.1 G/DL
ALP SERPL-CCNC: 164 U/L (ref 39–117)
ALT SERPL W P-5'-P-CCNC: 8 U/L (ref 1–41)
ANION GAP SERPL CALCULATED.3IONS-SCNC: 10 MMOL/L (ref 5–15)
AST SERPL-CCNC: 13 U/L (ref 1–40)
BASOPHILS # BLD AUTO: 0.04 10*3/MM3 (ref 0–0.2)
BASOPHILS NFR BLD AUTO: 0.5 % (ref 0–1.5)
BILIRUB SERPL-MCNC: 0.2 MG/DL (ref 0–1.2)
BILIRUB UR QL STRIP: NEGATIVE
BUN SERPL-MCNC: 22 MG/DL (ref 8–23)
BUN/CREAT SERPL: 14.1 (ref 7–25)
CALCIUM SPEC-SCNC: 8.9 MG/DL (ref 8.2–9.6)
CHLORIDE SERPL-SCNC: 101 MMOL/L (ref 98–107)
CLARITY UR: CLEAR
CO2 SERPL-SCNC: 23 MMOL/L (ref 22–29)
COLOR UR: YELLOW
CREAT SERPL-MCNC: 1.56 MG/DL (ref 0.76–1.27)
DEPRECATED RDW RBC AUTO: 40.7 FL (ref 37–54)
EGFRCR SERPLBLD CKD-EPI 2021: 41.7 ML/MIN/1.73
EOSINOPHIL # BLD AUTO: 0.22 10*3/MM3 (ref 0–0.4)
EOSINOPHIL NFR BLD AUTO: 2.6 % (ref 0.3–6.2)
ERYTHROCYTE [DISTWIDTH] IN BLOOD BY AUTOMATED COUNT: 12.4 % (ref 12.3–15.4)
GLOBULIN UR ELPH-MCNC: 3.4 GM/DL
GLUCOSE SERPL-MCNC: 103 MG/DL (ref 65–99)
GLUCOSE UR STRIP-MCNC: NEGATIVE MG/DL
HCT VFR BLD AUTO: 29.9 % (ref 37.5–51)
HGB BLD-MCNC: 9.8 G/DL (ref 13–17.7)
HGB UR QL STRIP.AUTO: NEGATIVE
IMM GRANULOCYTES # BLD AUTO: 0.05 10*3/MM3 (ref 0–0.05)
IMM GRANULOCYTES NFR BLD AUTO: 0.6 % (ref 0–0.5)
KETONES UR QL STRIP: NEGATIVE
LEUKOCYTE ESTERASE UR QL STRIP.AUTO: NEGATIVE
LYMPHOCYTES # BLD AUTO: 1.04 10*3/MM3 (ref 0.7–3.1)
LYMPHOCYTES NFR BLD AUTO: 12.5 % (ref 19.6–45.3)
MAGNESIUM SERPL-MCNC: 1.8 MG/DL (ref 1.7–2.3)
MCH RBC QN AUTO: 29.5 PG (ref 26.6–33)
MCHC RBC AUTO-ENTMCNC: 32.8 G/DL (ref 31.5–35.7)
MCV RBC AUTO: 90.1 FL (ref 79–97)
MONOCYTES # BLD AUTO: 1.19 10*3/MM3 (ref 0.1–0.9)
MONOCYTES NFR BLD AUTO: 14.3 % (ref 5–12)
NEUTROPHILS NFR BLD AUTO: 5.8 10*3/MM3 (ref 1.7–7)
NEUTROPHILS NFR BLD AUTO: 69.5 % (ref 42.7–76)
NITRITE UR QL STRIP: NEGATIVE
NRBC BLD AUTO-RTO: 0 /100 WBC (ref 0–0.2)
PH UR STRIP.AUTO: 6 [PH] (ref 5–8)
PLATELET # BLD AUTO: 266 10*3/MM3 (ref 140–450)
PMV BLD AUTO: 10.2 FL (ref 6–12)
POTASSIUM SERPL-SCNC: 5 MMOL/L (ref 3.5–5.2)
PROT SERPL-MCNC: 7.1 G/DL (ref 6–8.5)
PROT UR QL STRIP: NEGATIVE
QT INTERVAL: 347 MS
QTC INTERVAL: 423 MS
RBC # BLD AUTO: 3.32 10*6/MM3 (ref 4.14–5.8)
SODIUM SERPL-SCNC: 134 MMOL/L (ref 136–145)
SP GR UR STRIP: >1.03 (ref 1–1.03)
UROBILINOGEN UR QL STRIP: ABNORMAL
WBC NRBC COR # BLD AUTO: 8.34 10*3/MM3 (ref 3.4–10.8)

## 2024-10-09 PROCEDURE — 99285 EMERGENCY DEPT VISIT HI MDM: CPT

## 2024-10-09 PROCEDURE — 83735 ASSAY OF MAGNESIUM: CPT | Performed by: STUDENT IN AN ORGANIZED HEALTH CARE EDUCATION/TRAINING PROGRAM

## 2024-10-09 PROCEDURE — 71275 CT ANGIOGRAPHY CHEST: CPT

## 2024-10-09 PROCEDURE — 81003 URINALYSIS AUTO W/O SCOPE: CPT | Performed by: STUDENT IN AN ORGANIZED HEALTH CARE EDUCATION/TRAINING PROGRAM

## 2024-10-09 PROCEDURE — 80053 COMPREHEN METABOLIC PANEL: CPT | Performed by: STUDENT IN AN ORGANIZED HEALTH CARE EDUCATION/TRAINING PROGRAM

## 2024-10-09 PROCEDURE — 93005 ELECTROCARDIOGRAM TRACING: CPT | Performed by: STUDENT IN AN ORGANIZED HEALTH CARE EDUCATION/TRAINING PROGRAM

## 2024-10-09 PROCEDURE — 25510000001 IOPAMIDOL PER 1 ML: Performed by: STUDENT IN AN ORGANIZED HEALTH CARE EDUCATION/TRAINING PROGRAM

## 2024-10-09 PROCEDURE — 85025 COMPLETE CBC W/AUTO DIFF WBC: CPT | Performed by: STUDENT IN AN ORGANIZED HEALTH CARE EDUCATION/TRAINING PROGRAM

## 2024-10-09 PROCEDURE — 36415 COLL VENOUS BLD VENIPUNCTURE: CPT | Performed by: STUDENT IN AN ORGANIZED HEALTH CARE EDUCATION/TRAINING PROGRAM

## 2024-10-09 RX ORDER — IOPAMIDOL 755 MG/ML
100 INJECTION, SOLUTION INTRAVASCULAR
Status: COMPLETED | OUTPATIENT
Start: 2024-10-09 | End: 2024-10-09

## 2024-10-09 RX ADMIN — IOPAMIDOL 95 ML: 755 INJECTION, SOLUTION INTRAVENOUS at 10:49

## 2024-10-09 NOTE — CONSULTS
Referring Provider: Dr. Almeida  Reason for Consultation: Lung mass    Patient Care Team:  Adam Feliz MD as PCP - General (Internal Medicine)  Joe Davis OD (Optometry)  Stef Claire MD as Consulting Physician (Ophthalmology)  Eric Nuñez MD as Consulting Physician (Nephrology)  Hilton Chavarria MD as Consulting Physician (Pulmonary Disease)  Skyler Weller MD as Consulting Physician (Gastroenterology)    Chief complaint:   Abnormal labs    History of present illness:    Subjective   This is a 89-year-old male patient, former smoker (40 packs year, stopped 15 years ago) with history of exposure to asbestos.  He has a history of pleural plaques and spontaneous pneumothorax x 3 on the left side.  History of moderately severe COPD, FEV1 52% on PFT in 2017.  He used to follow in our office with Dr. Chavarria and he was last seen in May 2023.    Concerning COPD, he uses Spiriva and albuterol neb once a day.  He was previously prescribed Spiriva but not clear whether he uses it.  Patient lives alone.  He is independent.  He gets some help from his family.  He has difficulty breathing on activities such as walking 20-30 feet's, elevated by rest and chronic mild cough.  No change from his baseline symptoms recently.  He does not use oxygen.    He presented to the hospital today for abnormal labs.  He was called by his PCP last night for elevated potassium.  He came into the ER and is labs were repeated and they were normal.  He did complain about dyspnea (which is his usual baseline shortness of breath) and underwent CTA of the chest which showed no PE but RLL lung mass 4 x 3 cm which is new compared to the last CT chest available on him dated 11/9/2020 at [place].  No pathologically enlarged mediastinal and hilar adenopathies.  Patient denies hemoptysis but reported losing couple of pounds recently.  He denies chest pain.  I was called to evaluate the patient due to the  lung mass.    Review of Systems  Constitutional: No fever or chills.   ENMT: No sinus congestion  Cardiovascular: No chest pain, palpitation or legs swelling.    Respiratory: See above  Gastrointestinal: No constipation, diarrhea or abdominal pain   Neurology: No headache, weakness, numbness or dizziness.   Musculoskeletal: No joints pain, stiffness or swelling.   Psychiatry: No depression.  Genitourinary: No dysuria or frequent urination  Endo: No weight changes. No cold or warm intolerance.  Lymphatic: No swollen glands.  Integumentary: No rash.    History  Past Medical History:   Diagnosis Date    Anemia     Arthritis     Blood clot in eye 2023    Cancer     skin: BASAL CELL on face, excised- remote    Colon polyp     History of COVID-19     2020    History of transfusion     Hyperlipidemia     Macular degeneration     Upper respiratory tract infection due to COVID-19 virus 11/12/2020    Urinary retention     post-op EGD on 2/1/2022   ,   Past Surgical History:   Procedure Laterality Date    APPENDECTOMY  1958    COLONOSCOPY  2013    ENDOSCOPY N/A 10/5/2016    Procedure: ESOPHAGOGASTRODUODENOSCOPY with biopsy;  Surgeon: Edward Gan MD;  Location: [facility] ENDOSCOPY;  Service:     ENDOSCOPY N/A 2/1/2022    Procedure: ESOPHAGOGASTRODUODENOSCOPY WITH 8MM-12MM BALLOON DILATION AND BIOPSIES;  Surgeon: Skyler Weller MD;  Location: [facility] ENDOSCOPY;  Service: Gastroenterology;  Laterality: N/A;  PRE- DYSPHAGIA  POST-  HIATAL HERNIA, ESOPHAGEAL STRICTURE    ENDOSCOPY N/A 5/12/2022    Procedure: ESOPHAGOGASTRODUODENOSCOPY WITH 15MM BALLOON PYLORIC DILATATION AND 12-18MM ESOPHAGEAL DILATATION;  Surgeon: Skyler Weller MD;  Location: [facility] ENDOSCOPY;  Service: Gastroenterology;  Laterality: N/A;  pre: dysphagia  post: esophageal stricture, pyloric stricture    EYE SURGERY      cataract b/l surgery    SKIN BIOPSY     ,   Family History   Problem Relation Age of Onset    No Known Problems Mother     No Known  Problems Father     Malig Hyperthermia Neg Hx    ,   Social History     Socioeconomic History    Marital status:    Tobacco Use    Smoking status: Former     Current packs/day: 0.00     Average packs/day: 1.5 packs/day for 10.0 years (15.0 ttl pk-yrs)     Types: Cigarettes     Start date: 2000     Quit date:      Years since quittin.7     Passive exposure: Past    Smokeless tobacco: Never   Vaping Use    Vaping status: Never Used   Substance and Sexual Activity    Alcohol use: Yes     Alcohol/week: 21.0 standard drinks of alcohol     Types: 21 Cans of beer per week     Comment: drinks 2 beers daily    Drug use: No    Sexual activity: Defer     E-cigarette/Vaping    E-cigarette/Vaping Use Never User     Passive Exposure No     Counseling Given No      E-cigarette/Vaping Substances    Nicotine No     THC No     CBD No     Flavoring No      E-cigarette/Vaping Devices    Disposable No     Pre-filled or Refillable Cartridge No     Refillable Tank No     Pre-filled Pod No          , (Not in a hospital admission) , Scheduled Meds:  , Continuous Infusions:  No current facility-administered medications for this encounter. , PRN Meds:  , and Allergies:  Polymyxin b-trimethoprim and Sulfa antibiotics    Objective     Vital Signs   Temp:  [96.9 °F (36.1 °C)] 96.9 °F (36.1 °C)  Heart Rate:  [84-92] 87  Resp:  [14] 14  BP: (126)/(69) 126/69    PPE used per hospital policy    Physical Exam:  Constitutional: Not in acute distress.  Eyes: Injected conjunctivae, EOMI. pupils equal reactive to light.  ENMT: Rivera 2.  Moist tongue  Neck:  Trachea midline. No thyromegaly  Heart: RRR, no murmur  Lungs: Equal but diminished air entry throughout.  No audible crackles or wheezing.  Abdomen:  Soft. No tenderness or dullness. No HSM.  Extremities: No cyanosis, clubbing or pitting edema.  Warm extremities and well-perfused.  Loss of muscle masses in arms or legs.  Neuro: Conscious, alert, oriented x3.  Strength 5/5 in  arms.  Psych: Appropriate mood and affect.    Integumentary: No rash.  Normal skin turgor  Lymphatic: No palpable cervical or supraclavicular lymph nodes.      Diagnostic imaging:  I personally and independently reviewed the following images:   CTA chest 10/9/2024: See above    Laboratory workup:    Results from last 7 days   Lab Units 10/09/24  0927 10/08/24  1113 10/03/24  1615   SODIUM mmol/L 134*  --  136   POTASSIUM mmol/L 5.0 6.1* 6.2*   CHLORIDE mmol/L 101  --  101   CO2 mmol/L 23.0  --  20   BUN mg/dL 22  --  21   CREATININE mg/dL 1.56*  --  1.73*   GLUCOSE mg/dL 103*  --  118*   CALCIUM mg/dL 8.9  --  9.4         Results from last 7 days   Lab Units 10/09/24  0927 10/03/24  1615   WBC 10*3/mm3 8.34 9.3   HEMOGLOBIN g/dL 9.8* 10.5*   HEMATOCRIT % 29.9* 34.2*   PLATELETS 10*3/mm3 266 305               Assessment     RLL lung mass, 4 x 3 cm,  COPD, moderately severe (FEV1 51%)/emphysema.  No current exacerbation  Chronic anemia  CKD stage III    Recommendations:    Discussed the new finding with the patient and with his daughter.  Explained that this is likely malignancy.  I explained to the patient that due to his age and performance status, and severe COPD, he would not be a candidate for resection.  He could be a candidate for radiation treatment..  Patient would like to pursue diagnosis and treatment.  I explained that he will require bronchoscopy and biopsies.  We talked about the risks including the risk of anesthesia, bleeding and pneumothorax and he is agreeable to proceed.  I will schedule him for robotic bronchoscopy with Dr. Mercer.      Continue Symbicort and Spiriva and albuterol HFA as needed  We could perform outpatient PFT on him.  Will address that later.        Benji Huynh MD  10/09/24  12:08 EDT        Time: 48 min

## 2024-10-09 NOTE — ED PROVIDER NOTES
EMERGENCY DEPARTMENT ENCOUNTER  Room Number:  16/16  PCP: Adam Feliz MD  Independent Historians: Patient and Family      HPI:  Chief Complaint: had concerns including Abnormal Lab.     Context: Louis Villalba is a 89 y.o. male with a medical history of COPD, HTN, CKD, BPH, HLD, GERD who presents to the ED c/o acute laboratory abnormality.  Patient had outpatient labs performed which demonstrated potassium of 6.1.  Patient sent to the ER for further evaluation and management.  Patient additionally noted to have cough and coarse breath sounds on right side.      Review of prior external notes (non-ED) -and- Review of prior external test results outside of this encounter: Office visit with internal medicine from 10/3/2024 reviewed and notable for annual wellness visit.  Patient noted to have CKD, HLD, HTN, iron deficiency anemia.  Plan at that time was to obtain CBC, CMP, iron studies, lipid panel and follow-up as needed.    Prescription drug monitoring program review:         PAST MEDICAL HISTORY  Active Ambulatory Problems     Diagnosis Date Noted    Gastric outlet obstruction 10/01/2016    COPD (chronic obstructive pulmonary disease) 10/03/2017    HTN (hypertension) 12/24/2017    Lung nodule 12/24/2017    CKD (chronic kidney disease) stage 3, GFR 30-59 ml/min 12/24/2017    Benign prostatic hyperplasia without lower urinary tract symptoms 10/02/2013    Hyperkalemia 05/15/2014    Hyperlipidemia 12/30/2019    GERD (gastroesophageal reflux disease) 12/30/2019    Medicare annual wellness visit, subsequent 01/27/2020    BPPV (benign paroxysmal positional vertigo) 01/27/2020    Dizziness 11/03/2020    Shortness of breath 11/03/2020    Elevated liver function tests 11/12/2020    Other dysphagia 10/11/2021    History of esophageal stricture 10/11/2021    Macular degeneration 10/02/2023    Anemia 04/17/2024    Iron deficiency anemia 10/03/2024     Resolved Ambulatory Problems     Diagnosis Date Noted    BON (acute  kidney injury) 10/05/2016    Peptic ulcer disease 10/06/2016    Primary spontaneous pneumothorax 10/03/2017    Acute exacerbation of chronic obstructive pulmonary disease (COPD) 2017    Influenza B 2017    COPD exacerbation 2018    Left otitis media 2020    Upper respiratory tract infection due to COVID-19 virus 2020     Past Medical History:   Diagnosis Date    Arthritis     Blood clot in eye     Cancer     Colon polyp     History of COVID-19     History of transfusion     Urinary retention          PAST SURGICAL HISTORY  Past Surgical History:   Procedure Laterality Date    APPENDECTOMY      COLONOSCOPY      ENDOSCOPY N/A 10/5/2016    Procedure: ESOPHAGOGASTRODUODENOSCOPY with biopsy;  Surgeon: Edward Gan MD;  Location: [facility] ENDOSCOPY;  Service:     ENDOSCOPY N/A 2022    Procedure: ESOPHAGOGASTRODUODENOSCOPY WITH 8MM-12MM BALLOON DILATION AND BIOPSIES;  Surgeon: Skyler Weller MD;  Location: [facility] ENDOSCOPY;  Service: Gastroenterology;  Laterality: N/A;  PRE- DYSPHAGIA  POST-  HIATAL HERNIA, ESOPHAGEAL STRICTURE    ENDOSCOPY N/A 2022    Procedure: ESOPHAGOGASTRODUODENOSCOPY WITH 15MM BALLOON PYLORIC DILATATION AND 12-18MM ESOPHAGEAL DILATATION;  Surgeon: Skyler Weller MD;  Location: [facility] ENDOSCOPY;  Service: Gastroenterology;  Laterality: N/A;  pre: dysphagia  post: esophageal stricture, pyloric stricture    EYE SURGERY      cataract b/l surgery    SKIN BIOPSY           FAMILY HISTORY  Family History   Problem Relation Age of Onset    No Known Problems Mother     No Known Problems Father     Malig Hyperthermia Neg Hx          SOCIAL HISTORY  Social History     Socioeconomic History    Marital status:    Tobacco Use    Smoking status: Former     Current packs/day: 0.00     Average packs/day: 1.5 packs/day for 10.0 years (15.0 ttl pk-yrs)     Types: Cigarettes     Start date: 2000     Quit date:      Years since quittin.7      Passive exposure: Past    Smokeless tobacco: Never   Vaping Use    Vaping status: Never Used   Substance and Sexual Activity    Alcohol use: Yes     Alcohol/week: 21.0 standard drinks of alcohol     Types: 21 Cans of beer per week     Comment: drinks 2 beers daily    Drug use: No    Sexual activity: Defer         ALLERGIES  Polymyxin b-trimethoprim and Sulfa antibiotics      REVIEW OF SYSTEMS  Review of Systems  Included in HPI  All systems reviewed and negative except for those discussed in HPI.      PHYSICAL EXAM    I have reviewed the triage vital signs and nursing notes.    ED Triage Vitals   Temp Pulse Resp BP SpO2   -- -- -- -- --      Temp src Heart Rate Source Patient Position BP Location FiO2 (%)   -- -- -- -- --       Physical Exam  GENERAL: alert, no acute distress  SKIN: Warm, dry  HENT: Normocephalic, atraumatic  EYES: no scleral icterus  CV: regular rhythm, regular rate  RESPIRATORY: normal effort, coarse breath sounds on right side  ABDOMEN: soft, nontender, nondistended  MUSCULOSKELETAL: no deformity  NEURO: alert, moves all extremities, follows commands            LAB RESULTS  Recent Results (from the past 24 hour(s))   ECG 12 Lead Electrolyte Imbalance    Collection Time: 10/09/24  9:05 AM   Result Value Ref Range    QT Interval 347 ms    QTC Interval 423 ms   Comprehensive Metabolic Panel    Collection Time: 10/09/24  9:27 AM    Specimen: Arm, Right; Blood   Result Value Ref Range    Glucose 103 (H) 65 - 99 mg/dL    BUN 22 8 - 23 mg/dL    Creatinine 1.56 (H) 0.76 - 1.27 mg/dL    Sodium 134 (L) 136 - 145 mmol/L    Potassium 5.0 3.5 - 5.2 mmol/L    Chloride 101 98 - 107 mmol/L    CO2 23.0 22.0 - 29.0 mmol/L    Calcium 8.9 8.2 - 9.6 mg/dL    Total Protein 7.1 6.0 - 8.5 g/dL    Albumin 3.7 3.5 - 5.2 g/dL    ALT (SGPT) 8 1 - 41 U/L    AST (SGOT) 13 1 - 40 U/L    Alkaline Phosphatase 164 (H) 39 - 117 U/L    Total Bilirubin 0.2 0.0 - 1.2 mg/dL    Globulin 3.4 gm/dL    A/G Ratio 1.1 g/dL    BUN/Creatinine  Ratio 14.1 7.0 - 25.0    Anion Gap 10.0 5.0 - 15.0 mmol/L    eGFR 41.7 (L) >60.0 mL/min/1.73   Magnesium    Collection Time: 10/09/24  9:27 AM    Specimen: Arm, Right; Blood   Result Value Ref Range    Magnesium 1.8 1.7 - 2.3 mg/dL   CBC Auto Differential    Collection Time: 10/09/24  9:27 AM    Specimen: Arm, Right; Blood   Result Value Ref Range    WBC 8.34 3.40 - 10.80 10*3/mm3    RBC 3.32 (L) 4.14 - 5.80 10*6/mm3    Hemoglobin 9.8 (L) 13.0 - 17.7 g/dL    Hematocrit 29.9 (L) 37.5 - 51.0 %    MCV 90.1 79.0 - 97.0 fL    MCH 29.5 26.6 - 33.0 pg    MCHC 32.8 31.5 - 35.7 g/dL    RDW 12.4 12.3 - 15.4 %    RDW-SD 40.7 37.0 - 54.0 fl    MPV 10.2 6.0 - 12.0 fL    Platelets 266 140 - 450 10*3/mm3    Neutrophil % 69.5 42.7 - 76.0 %    Lymphocyte % 12.5 (L) 19.6 - 45.3 %    Monocyte % 14.3 (H) 5.0 - 12.0 %    Eosinophil % 2.6 0.3 - 6.2 %    Basophil % 0.5 0.0 - 1.5 %    Immature Grans % 0.6 (H) 0.0 - 0.5 %    Neutrophils, Absolute 5.80 1.70 - 7.00 10*3/mm3    Lymphocytes, Absolute 1.04 0.70 - 3.10 10*3/mm3    Monocytes, Absolute 1.19 (H) 0.10 - 0.90 10*3/mm3    Eosinophils, Absolute 0.22 0.00 - 0.40 10*3/mm3    Basophils, Absolute 0.04 0.00 - 0.20 10*3/mm3    Immature Grans, Absolute 0.05 0.00 - 0.05 10*3/mm3    nRBC 0.0 0.0 - 0.2 /100 WBC   Urinalysis With Microscopic If Indicated (No Culture) - Urine, Clean Catch    Collection Time: 10/09/24 12:05 PM    Specimen: Urine, Clean Catch   Result Value Ref Range    Color, UA Yellow Yellow, Straw    Appearance, UA Clear Clear    pH, UA 6.0 5.0 - 8.0    Specific Gravity, UA >1.030 (H) 1.005 - 1.030    Glucose, UA Negative Negative    Ketones, UA Negative Negative    Bilirubin, UA Negative Negative    Blood, UA Negative Negative    Protein, UA Negative Negative    Leuk Esterase, UA Negative Negative    Nitrite, UA Negative Negative    Urobilinogen, UA 0.2 E.U./dL 0.2 - 1.0 E.U./dL         RADIOLOGY  CT Angiogram Chest Pulmonary Embolism    Result Date: 10/9/2024  CT ANGIOGRAM OF  THE CHEST. MULTIPLE CORONAL, SAGITTAL, AND 3-D RECONSTRUCTIONS.  HISTORY: 89-year-old male with shortness of breath.  TECHNIQUE: Radiation dose reduction techniques were utilized, including automated exposure control and exposure modulation based on body size. CT angiogram of the chest was performed with 2mm images following the administration of IV contrast. Multiple coronal, sagittal, and 3-D reconstruction images were obtained. Compared with chest CT 12/16/2019.  FINDINGS: 1. There is no evidence for pulmonary thromboemboli.  2. There is a new 4.5 x 3.7 cm lobular right lower lobe lung mass which likely represents a primary lung malignancy. There are tiny linear extensions to the pleura medially and posteriorly. There are no pleural or pericardial effusions.  There is ill-defined soft tissue fullness in the subcarinal region, but no discrete lymphadenopathy, there is a borderline enlarged right hilar node, series 4 image 76.  3. There is advanced emphysema and there are extensive bilateral calcified pleural plaques and pleural diaphragmatic plaques consistent with asbestos exposure.  4. No acute abnormality is seen at the visualized upper abdomen other than probable cholelithiasis.         MEDICATIONS GIVEN IN ER  Medications   iopamidol (ISOVUE-370) 76 % injection 100 mL (95 mL Intravenous Given 10/9/24 1049)         ORDERS PLACED DURING THIS VISIT:  Orders Placed This Encounter   Procedures    CT Angiogram Chest Pulmonary Embolism    Comprehensive Metabolic Panel    Magnesium    Urinalysis With Microscopic If Indicated (No Culture) - Urine, Clean Catch    CBC Auto Differential    Pulmonology (on-call MD unless specified)    ECG 12 Lead Electrolyte Imbalance    CBC & Differential         OUTPATIENT MEDICATION MANAGEMENT:  No current Epic-ordered facility-administered medications on file.     Current Outpatient Medications Ordered in Epic   Medication Sig Dispense Refill    acyclovir (ZOVIRAX) 800 MG tablet Take  1 tablet by mouth 3 times a day.      albuterol (PROVENTIL) (2.5 MG/3ML) 0.083% nebulizer solution Take 2.5 mg by nebulization 4 (Four) Times a Day As Needed for Wheezing. 125 vial 2    albuterol sulfate  (90 Base) MCG/ACT inhaler Inhale 2 puffs Every 4 (Four) Hours As Needed for Wheezing. 18 g 1    atorvastatin (LIPITOR) 10 MG tablet Take 1 tablet by mouth Daily. 90 tablet 1    brimonidine-timolol (COMBIGAN) 0.2-0.5 % ophthalmic solution Administer 1 drop to both eyes Every 12 (Twelve) Hours.      docusate sodium (COLACE) 100 MG capsule Take 1 capsule by mouth 2 (Two) Times a Day.      erythromycin (ROMYCIN) 5 MG/GM ophthalmic ointment Administer  to both eyes 1 (One) Time.      Ferrous Sulfate  (45 Fe) MG tablet controlled-release Take 1 tablet by mouth Daily. 30 tablet 2    levothyroxine (SYNTHROID, LEVOTHROID) 25 MCG tablet Take 1 tablet by mouth Every Morning. Take on empty stomach 30 tablet 2    meclizine (ANTIVERT) 25 MG tablet Take 1 tablet by mouth 3 (Three) Times a Day As Needed for Dizziness. 60 tablet 1    moxifloxacin (VIGAMOX) 0.5 % ophthalmic solution INSTILL 1 DROP IN LEFT EYE EVERY 2 HOURS AS DIRECTED      Multiple Vitamins-Minerals (PRESERVISION AREDS 2 PO) Take 1 tablet by mouth Daily.      pantoprazole (PROTONIX) 40 MG EC tablet TAKE 1 TABLET BY MOUTH TWICE DAILY 180 tablet 3    tamsulosin (FLOMAX) 0.4 MG capsule 24 hr capsule Take 1 capsule by mouth Daily. 90 capsule 1    tiotropium bromide-olodaterol (Stiolto Respimat) 2.5-2.5 MCG/ACT aerosol solution inhaler Inhale 2 puffs Daily. 4 g 5         PROCEDURES  Procedures            PROGRESS, DATA ANALYSIS, CONSULTS, AND MEDICAL DECISION MAKING  All labs have been independently interpreted by me.  All radiology studies have been reviewed by me. All EKG's have been independently viewed and interpreted by me.  Discussion below represents my analysis of pertinent findings related to patient's condition, differential diagnosis, treatment  plan and final disposition.    Differential diagnosis includes but is not limited to spurious measurement, hemolysis, hyperkalemia, pneumonia.    Clinical Scores:                   ED Course as of 10/09/24 1249   Wed Oct 09, 2024   0954 EKG interpreted by me demonstrates sinus rhythm, rate of 89, no TX/QT prolongation, no ST elevation [MW]   1130 CT chest interpreted by me and demonstrates no evidence of pulmonary embolism [MW]   1204 Radiological evaluation demonstrates mass concerning for malignancy in the right lower lobe.  Thankfully laboratory evaluation is overall unremarkable with a normalized potassium. [MW]   1204 Discussed with Dr. Huynh of pulmonology who will evaluate patient at bedside and provide treatment course [MW]      ED Course User Index  [MW] Adam Almeida MD             AS OF 12:49 EDT VITALS:    BP - 126/69  HR - 90  TEMP - 96.9 °F (36.1 °C)  O2 SATS - 95%    COMPLEXITY OF CARE  Admission was considered but after careful review of the patient's presentation, physical examination, diagnostic results, and response to treatment the patient may be safely discharged with outpatient follow-up.      DIAGNOSIS  Final diagnoses:   Right lower lobe lung mass         DISPOSITION  ED Disposition       ED Disposition   Discharge    Condition   Stable    Comment   --                Please note that portions of this document were completed with a voice recognition program.    Note Disclaimer: At [facility], we believe that sharing information builds trust and better relationships. You are receiving this note because you recently visited [facility]. It is possible you will see health information before a provider has talked with you about it. This kind of information can be easy to misunderstand. To help you fully understand what it means for your health, we urge you to discuss this note with your provider.         Adam Almeida MD  10/09/24 1249

## 2024-10-09 NOTE — TELEPHONE ENCOUNTER
PATIENT'S DAUGHTER CLINTON CALLED IN REGARDS TO LABS DONE ON 10/3/24 AND UPCOMING LABS.     SHE IS ON  VERBAL    SHE STATES PATIENT RECEIVED A CALL LAST NIGHT AT 11:00 FROM DR. VILLA.ABOUT HIS POTASSIUM BEING ELEVATED. . HE IS CURRENTLY IN THE ER AT [facility].     PLEASE CALL AND ADVISE [phone]

## 2024-10-09 NOTE — DISCHARGE INSTRUCTIONS
Please follow-up with your primary care physician within 1 week for repeat evaluation    I have included the information for Dr. Au office in your paperwork.  They should contact you to schedule follow-up appointments however if you do not hear from them you may call their office    Please return to the ER with new or worsening symptoms including but not limited to chest pain, shortness breath, lightheadedness, fevers, chills, changes in mental status.

## 2024-10-10 ENCOUNTER — OFFICE VISIT (OUTPATIENT)
Dept: FAMILY MEDICINE CLINIC | Facility: CLINIC | Age: 89
End: 2024-10-10
Payer: MEDICARE

## 2024-10-10 VITALS
HEART RATE: 89 BPM | SYSTOLIC BLOOD PRESSURE: 130 MMHG | DIASTOLIC BLOOD PRESSURE: 78 MMHG | BODY MASS INDEX: 18.1 KG/M2 | WEIGHT: 106 LBS | OXYGEN SATURATION: 99 % | TEMPERATURE: 98 F | HEIGHT: 64 IN

## 2024-10-10 DIAGNOSIS — J44.9 CHRONIC OBSTRUCTIVE PULMONARY DISEASE, UNSPECIFIED COPD TYPE: ICD-10-CM

## 2024-10-10 DIAGNOSIS — R91.8 RIGHT LOWER LOBE LUNG MASS: Primary | ICD-10-CM

## 2024-10-10 PROBLEM — K80.20 CALCULUS OF GALLBLADDER WITHOUT CHOLECYSTITIS WITHOUT OBSTRUCTION: Status: ACTIVE | Noted: 2024-10-10

## 2024-10-10 PROCEDURE — 99215 OFFICE O/P EST HI 40 MIN: CPT | Performed by: INTERNAL MEDICINE

## 2024-10-10 PROCEDURE — 1126F AMNT PAIN NOTED NONE PRSNT: CPT | Performed by: INTERNAL MEDICINE

## 2024-10-10 NOTE — TELEPHONE ENCOUNTER
Spoke with patient daughter he was seen in the ER yesterday and they found a mass on the patient lungs daughter was transferred to  to get an appointment to come in and see Jeanne in the next 144 days

## 2024-10-10 NOTE — PROGRESS NOTES
Subjective   Louis Villalba is a 89 y.o. male.     Chief Complaint   Patient presents with    lower lobe mass       History of Present Illness   Daughter and Son was present during the history-taking and subsequent discussion (and for part of the physical exam) with this patient.  Patient agrees to the presence of the individual during this visit.    Patient is here for follow-up after was seen in our office on 10/3/2024 for Medicare wellness and had labs.  Initial labs demonstrated potassium level of 6.2.  Patient was asked to stop any kind of potassium supplements and had rechecked on 10/8/2024 and noted to have a level of 6.1 that was called to on-call doctor Juliette and referred to emergency room.  Initially physician was told no hemolysis but subsequently was noted to have some slight hemolysis detected.  However patient was seen in the emergency room on the morning of 10/9/2024 having labs repeated demonstrating the potassium is down to normal at 5.0 with a BUN and creatinine of 22 and 1.56 demonstrating a GFR of 41.7.  CBC noted to have a hemoglobin of 9.8.  While in the emergency room when asked if he had shortness of breath he said yes but has COPD.  Subsequently he did undergo a CT angiogram of the chest which discovered a 4.5 x 3.7 cm lobular right lower lobe lung mass likely representing a primary lung malignancy and possible tiny linear extension to the pleura medially and posteriorly.  There is also noted to be advanced emphysema with pleural plaques consistent with asbestos exposure within his history.  ER physician subsequently told family patient has cancer and patient was discharged home.  Since that time family has been very upset and anxious with the diagnosis.  Does have appointment scheduled with pulmonology Dr. Mercer for tomorrow for evaluation for possible biopsy.  Family is obviously upset and concerned.  Patient is calm.  He denies any new shortness of breath pain or other difficulties  at this time.    The following portions of the patient's history were reviewed and updated as appropriate: allergies, current medications, past family history, past medical history, past social history, past surgical history and problem list.    Depression Screen:      10/3/2024     3:32 PM   PHQ-2/PHQ-9 Depression Screening   Little Interest or Pleasure in Doing Things 0-->not at all   Feeling Down, Depressed or Hopeless 0-->not at all   PHQ-9: Brief Depression Severity Measure Score 0       Past Medical History:   Diagnosis Date    Anemia     Arthritis     Blood clot in eye 2023    Cancer     skin: BASAL CELL on face, excised- remote    Colon polyp     History of COVID-19     2020    History of transfusion     Hyperlipidemia     Macular degeneration     Upper respiratory tract infection due to COVID-19 virus 11/12/2020    Urinary retention     post-op EGD on 2/1/2022       Past Surgical History:   Procedure Laterality Date    APPENDECTOMY  1958    COLONOSCOPY  2013    ENDOSCOPY N/A 10/5/2016    Procedure: ESOPHAGOGASTRODUODENOSCOPY with biopsy;  Surgeon: Edward Gan MD;  Location: [facility] ENDOSCOPY;  Service:     ENDOSCOPY N/A 2/1/2022    Procedure: ESOPHAGOGASTRODUODENOSCOPY WITH 8MM-12MM BALLOON DILATION AND BIOPSIES;  Surgeon: Skyler Weller MD;  Location: [facility] ENDOSCOPY;  Service: Gastroenterology;  Laterality: N/A;  PRE- DYSPHAGIA  POST-  HIATAL HERNIA, ESOPHAGEAL STRICTURE    ENDOSCOPY N/A 5/12/2022    Procedure: ESOPHAGOGASTRODUODENOSCOPY WITH 15MM BALLOON PYLORIC DILATATION AND 12-18MM ESOPHAGEAL DILATATION;  Surgeon: Skyler Weller MD;  Location: [facility] ENDOSCOPY;  Service: Gastroenterology;  Laterality: N/A;  pre: dysphagia  post: esophageal stricture, pyloric stricture    EYE SURGERY      cataract b/l surgery    SKIN BIOPSY         Family History   Problem Relation Age of Onset    No Known Problems Mother     No Known Problems Father     Malig Hyperthermia Neg Hx        Social History      Socioeconomic History    Marital status:    Tobacco Use    Smoking status: Former     Current packs/day: 0.00     Average packs/day: 1.5 packs/day for 10.0 years (15.0 ttl pk-yrs)     Types: Cigarettes     Start date: 2000     Quit date:      Years since quittin.7     Passive exposure: Past    Smokeless tobacco: Never   Vaping Use    Vaping status: Never Used   Substance and Sexual Activity    Alcohol use: Yes     Alcohol/week: 21.0 standard drinks of alcohol     Types: 21 Cans of beer per week     Comment: drinks 2 beers daily    Drug use: No    Sexual activity: Defer       Current Outpatient Medications   Medication Sig Dispense Refill    acyclovir (ZOVIRAX) 800 MG tablet Take 1 tablet by mouth 3 times a day.      albuterol (PROVENTIL) (2.5 MG/3ML) 0.083% nebulizer solution Take 2.5 mg by nebulization 4 (Four) Times a Day As Needed for Wheezing. 125 vial 2    albuterol sulfate  (90 Base) MCG/ACT inhaler Inhale 2 puffs Every 4 (Four) Hours As Needed for Wheezing. 18 g 1    atorvastatin (LIPITOR) 10 MG tablet Take 1 tablet by mouth Daily. 90 tablet 1    brimonidine-timolol (COMBIGAN) 0.2-0.5 % ophthalmic solution Administer 1 drop to both eyes Every 12 (Twelve) Hours.      docusate sodium (COLACE) 100 MG capsule Take 1 capsule by mouth 2 (Two) Times a Day.      erythromycin (ROMYCIN) 5 MG/GM ophthalmic ointment Administer  to both eyes 1 (One) Time.      Ferrous Sulfate  (45 Fe) MG tablet controlled-release Take 1 tablet by mouth Daily. 30 tablet 2    levothyroxine (SYNTHROID, LEVOTHROID) 25 MCG tablet Take 1 tablet by mouth Every Morning. Take on empty stomach 30 tablet 2    meclizine (ANTIVERT) 25 MG tablet Take 1 tablet by mouth 3 (Three) Times a Day As Needed for Dizziness. 60 tablet 1    moxifloxacin (VIGAMOX) 0.5 % ophthalmic solution INSTILL 1 DROP IN LEFT EYE EVERY 2 HOURS AS DIRECTED      Multiple Vitamins-Minerals (PRESERVISION AREDS 2 PO) Take 1 tablet by mouth  "Daily.      pantoprazole (PROTONIX) 40 MG EC tablet TAKE 1 TABLET BY MOUTH TWICE DAILY 180 tablet 3    tamsulosin (FLOMAX) 0.4 MG capsule 24 hr capsule Take 1 capsule by mouth Daily. 90 capsule 1    tiotropium bromide-olodaterol (Stiolto Respimat) 2.5-2.5 MCG/ACT aerosol solution inhaler Inhale 2 puffs Daily. 4 g 5     No current facility-administered medications for this visit.       Review of Systems   Constitutional:  Negative for activity change, appetite change, fatigue, fever, unexpected weight gain and unexpected weight loss.   HENT:  Negative for nosebleeds, rhinorrhea, trouble swallowing and voice change.    Eyes:         Patient has known chronic visual loss   Respiratory:  Negative for cough, chest tightness and wheezing.         Known chronic shortness of breath that is unchanged from baseline   Cardiovascular:  Negative for chest pain, palpitations and leg swelling.   Gastrointestinal:  Negative for abdominal pain, blood in stool, constipation, diarrhea, nausea, vomiting, GERD and indigestion.   Genitourinary:  Negative for dysuria, frequency and hematuria.   Musculoskeletal:  Negative for arthralgias, back pain and myalgias.   Skin:  Negative for rash and wound.   Neurological:  Negative for dizziness, tremors, weakness, light-headedness, numbness, headache and memory problem.   Hematological:  Negative for adenopathy. Does not bruise/bleed easily.   Psychiatric/Behavioral:  Negative for sleep disturbance and depressed mood. The patient is not nervous/anxious.        Objective   /78 (BP Location: Left arm, Patient Position: Sitting, Cuff Size: Adult)   Pulse 89   Temp 98 °F (36.7 °C) (Temporal)   Ht 162.6 cm (64\")   Wt 48.1 kg (106 lb)   SpO2 99%   BMI 18.19 kg/m²     Physical Exam  Vitals and nursing note reviewed.   Constitutional:       General: He is not in acute distress.     Appearance: He is well-developed. He is not diaphoretic.   HENT:      Head: Normocephalic and atraumatic.      " Right Ear: External ear normal.      Left Ear: External ear normal.      Nose: Nose normal.   Eyes:      Conjunctiva/sclera: Conjunctivae normal.      Pupils: Pupils are equal, round, and reactive to light.   Neck:      Thyroid: No thyromegaly.      Trachea: No tracheal deviation.   Cardiovascular:      Rate and Rhythm: Normal rate and regular rhythm.      Heart sounds: Normal heart sounds. No murmur heard.     No friction rub. No gallop.   Pulmonary:      Effort: Pulmonary effort is normal. No respiratory distress.      Breath sounds: Normal breath sounds.   Abdominal:      General: Bowel sounds are normal.      Palpations: Abdomen is soft. There is no mass.      Tenderness: There is no abdominal tenderness. There is no guarding.   Musculoskeletal:         General: Normal range of motion.      Cervical back: Normal range of motion and neck supple.   Lymphadenopathy:      Cervical: No cervical adenopathy.   Skin:     General: Skin is warm and dry.      Capillary Refill: Capillary refill takes less than 2 seconds.      Findings: No rash.   Neurological:      Mental Status: He is alert and oriented to person, place, and time.      Motor: No abnormal muscle tone.      Deep Tendon Reflexes: Reflexes normal.   Psychiatric:         Behavior: Behavior normal.         Thought Content: Thought content normal.         Judgment: Judgment normal.         Recent Results (from the past 2016 hour(s))   TSH Rfx On Abnormal To Free T4    Collection Time: 08/06/24  9:42 AM    Specimen: Blood   Result Value Ref Range    TSH 9.900 (H) 0.270 - 4.200 uIU/mL   Basic Metabolic Panel    Collection Time: 08/06/24  9:42 AM    Specimen: Blood   Result Value Ref Range    Glucose 126 (H) 65 - 99 mg/dL    BUN 26 (H) 8 - 23 mg/dL    Creatinine 1.72 (H) 0.76 - 1.27 mg/dL    EGFR Result 37.1 (L) >60.0 mL/min/1.73    BUN/Creatinine Ratio 15.1 7.0 - 25.0    Sodium 136 136 - 145 mmol/L    Potassium 5.7 (H) 3.5 - 5.2 mmol/L    Chloride 100 98 - 107  mmol/L    Total CO2 23.0 22.0 - 29.0 mmol/L    Calcium 9.4 8.2 - 9.6 mg/dL   T4F    Collection Time: 08/06/24  9:42 AM   Result Value Ref Range    Free T4 1.41 0.93 - 1.70 ng/dL   Basic Metabolic Panel    Collection Time: 08/19/24  9:48 AM    Specimen: Blood   Result Value Ref Range    Glucose 131 (H) 65 - 99 mg/dL    BUN 19 8 - 23 mg/dL    Creatinine 1.46 (H) 0.76 - 1.27 mg/dL    EGFR Result 45.1 (L) >60.0 mL/min/1.73    BUN/Creatinine Ratio 13.0 7.0 - 25.0    Sodium 137 136 - 145 mmol/L    Potassium 5.5 (H) 3.5 - 5.2 mmol/L    Chloride 101 98 - 107 mmol/L    Total CO2 22.1 22.0 - 29.0 mmol/L    Calcium 9.4 8.2 - 9.6 mg/dL   CBC & Differential    Collection Time: 08/19/24  9:48 AM    Specimen: Blood   Result Value Ref Range    WBC 8.79 3.40 - 10.80 10*3/mm3    RBC 3.64 (L) 4.14 - 5.80 10*6/mm3    Hemoglobin 11.1 (L) 13.0 - 17.7 g/dL    Hematocrit 33.8 (L) 37.5 - 51.0 %    MCV 92.9 79.0 - 97.0 fL    MCH 30.5 26.6 - 33.0 pg    MCHC 32.8 31.5 - 35.7 g/dL    RDW 11.4 (L) 12.3 - 15.4 %    Platelets 287 140 - 450 10*3/mm3    Neutrophil Rel % 68.7 42.7 - 76.0 %    Lymphocyte Rel % 15.0 (L) 19.6 - 45.3 %    Monocyte Rel % 12.4 (H) 5.0 - 12.0 %    Eosinophil Rel % 2.7 0.3 - 6.2 %    Basophil Rel % 0.6 0.0 - 1.5 %    Neutrophils Absolute 6.04 1.70 - 7.00 10*3/mm3    Lymphocytes Absolute 1.32 0.70 - 3.10 10*3/mm3    Monocytes Absolute 1.09 (H) 0.10 - 0.90 10*3/mm3    Eosinophils Absolute 0.24 0.00 - 0.40 10*3/mm3    Basophils Absolute 0.05 0.00 - 0.20 10*3/mm3    Immature Granulocyte Rel % 0.6 (H) 0.0 - 0.5 %    Immature Grans Absolute 0.05 0.00 - 0.05 10*3/mm3    nRBC 0.0 0.0 - 0.2 /100 WBC   Iron    Collection Time: 08/19/24  9:48 AM    Specimen: Blood   Result Value Ref Range    Iron 37 (L) 59 - 158 mcg/dL   Ferritin    Collection Time: 08/19/24  9:48 AM    Specimen: Blood   Result Value Ref Range    Ferritin 159.00 30.00 - 400.00 ng/mL   POC Occult Blood Stool    Collection Time: 08/22/24  1:29 PM    Specimen: Stool    Result Value Ref Range    Fecal Occult Blood Negative Negative    Lot Number [id number]     Expiration Date 07/31/2025     DEVELOPER LOT NUMBER [id number]     DEVELOPER EXPIRATION DATE 07/31/2025     Positive Control Positive Positive    Negative Control Negative Negative   Comprehensive Metabolic Panel    Collection Time: 10/03/24  4:15 PM    Specimen: Blood   Result Value Ref Range    Glucose 118 (H) 70 - 99 mg/dL    BUN 21 10 - 36 mg/dL    Creatinine 1.73 (H) 0.76 - 1.27 mg/dL    EGFR Result 37 (L) >59 mL/min/1.73    BUN/Creatinine Ratio 12 10 - 24    Sodium 136 134 - 144 mmol/L    Potassium 6.2 (H) 3.5 - 5.2 mmol/L    Chloride 101 96 - 106 mmol/L    Total CO2 20 20 - 29 mmol/L    Calcium 9.4 8.6 - 10.2 mg/dL    Total Protein 7.8 6.0 - 8.5 g/dL    Albumin 4.4 3.6 - 4.6 g/dL    Globulin 3.4 1.5 - 4.5 g/dL    Total Bilirubin 0.2 0.0 - 1.2 mg/dL    Alkaline Phosphatase 183 (H) 44 - 121 IU/L    AST (SGOT) 17 0 - 40 IU/L    ALT (SGPT) 9 0 - 44 IU/L   Lipid Panel    Collection Time: 10/03/24  4:15 PM    Specimen: Blood   Result Value Ref Range    Total Cholesterol 152 100 - 199 mg/dL    Triglycerides 70 0 - 149 mg/dL    HDL Cholesterol 64 >39 mg/dL    VLDL Cholesterol Sandeep 14 5 - 40 mg/dL    LDL Chol Calc (NIH) 74 0 - 99 mg/dL   CBC & Differential    Collection Time: 10/03/24  4:15 PM    Specimen: Blood   Result Value Ref Range    WBC 9.3 3.4 - 10.8 x10E3/uL    RBC 3.67 (L) 4.14 - 5.80 x10E6/uL    Hemoglobin 10.5 (L) 13.0 - 17.7 g/dL    Hematocrit 34.2 (L) 37.5 - 51.0 %    MCV 93 79 - 97 fL    MCH 28.6 26.6 - 33.0 pg    MCHC 30.7 (L) 31.5 - 35.7 g/dL    RDW 12.5 11.6 - 15.4 %    Platelets 305 150 - 450 x10E3/uL    Neutrophil Rel % 64 Not Estab. %    Lymphocyte Rel % 19 Not Estab. %    Monocyte Rel % 13 Not Estab. %    Eosinophil Rel % 3 Not Estab. %    Basophil Rel % 1 Not Estab. %    Neutrophils Absolute 5.9 1.4 - 7.0 x10E3/uL    Lymphocytes Absolute 1.8 0.7 - 3.1 x10E3/uL    Monocytes Absolute 1.2 (H) 0.1 - 0.9 x10E3/uL     Eosinophils Absolute 0.3 0.0 - 0.4 x10E3/uL    Basophils Absolute 0.1 0.0 - 0.2 x10E3/uL    Immature Granulocyte Rel % 0 Not Estab. %    Immature Grans Absolute 0.0 0.0 - 0.1 x10E3/uL   Iron and TIBC    Collection Time: 10/03/24  4:15 PM    Specimen: Blood   Result Value Ref Range    TIBC 274 250 - 450 ug/dL    UIBC 251 111 - 343 ug/dL    Iron 23 (L) 38 - 169 ug/dL    Iron Saturation 8 (L) 15 - 55 %   Ferritin    Collection Time: 10/03/24  4:15 PM    Specimen: Blood   Result Value Ref Range    Ferritin 150 30 - 400 ng/mL   Potassium    Collection Time: 10/08/24 11:13 AM    Specimen: Blood   Result Value Ref Range    Potassium 6.1 (C) 3.5 - 5.2 mmol/L   ECG 12 Lead Electrolyte Imbalance    Collection Time: 10/09/24  9:05 AM   Result Value Ref Range    QT Interval 347 ms    QTC Interval 423 ms   Comprehensive Metabolic Panel    Collection Time: 10/09/24  9:27 AM    Specimen: Arm, Right; Blood   Result Value Ref Range    Glucose 103 (H) 65 - 99 mg/dL    BUN 22 8 - 23 mg/dL    Creatinine 1.56 (H) 0.76 - 1.27 mg/dL    Sodium 134 (L) 136 - 145 mmol/L    Potassium 5.0 3.5 - 5.2 mmol/L    Chloride 101 98 - 107 mmol/L    CO2 23.0 22.0 - 29.0 mmol/L    Calcium 8.9 8.2 - 9.6 mg/dL    Total Protein 7.1 6.0 - 8.5 g/dL    Albumin 3.7 3.5 - 5.2 g/dL    ALT (SGPT) 8 1 - 41 U/L    AST (SGOT) 13 1 - 40 U/L    Alkaline Phosphatase 164 (H) 39 - 117 U/L    Total Bilirubin 0.2 0.0 - 1.2 mg/dL    Globulin 3.4 gm/dL    A/G Ratio 1.1 g/dL    BUN/Creatinine Ratio 14.1 7.0 - 25.0    Anion Gap 10.0 5.0 - 15.0 mmol/L    eGFR 41.7 (L) >60.0 mL/min/1.73   Magnesium    Collection Time: 10/09/24  9:27 AM    Specimen: Arm, Right; Blood   Result Value Ref Range    Magnesium 1.8 1.7 - 2.3 mg/dL   CBC Auto Differential    Collection Time: 10/09/24  9:27 AM    Specimen: Arm, Right; Blood   Result Value Ref Range    WBC 8.34 3.40 - 10.80 10*3/mm3    RBC 3.32 (L) 4.14 - 5.80 10*6/mm3    Hemoglobin 9.8 (L) 13.0 - 17.7 g/dL    Hematocrit 29.9 (L) 37.5 -  "51.0 %    MCV 90.1 79.0 - 97.0 fL    MCH 29.5 26.6 - 33.0 pg    MCHC 32.8 31.5 - 35.7 g/dL    RDW 12.4 12.3 - 15.4 %    RDW-SD 40.7 37.0 - 54.0 fl    MPV 10.2 6.0 - 12.0 fL    Platelets 266 140 - 450 10*3/mm3    Neutrophil % 69.5 42.7 - 76.0 %    Lymphocyte % 12.5 (L) 19.6 - 45.3 %    Monocyte % 14.3 (H) 5.0 - 12.0 %    Eosinophil % 2.6 0.3 - 6.2 %    Basophil % 0.5 0.0 - 1.5 %    Immature Grans % 0.6 (H) 0.0 - 0.5 %    Neutrophils, Absolute 5.80 1.70 - 7.00 10*3/mm3    Lymphocytes, Absolute 1.04 0.70 - 3.10 10*3/mm3    Monocytes, Absolute 1.19 (H) 0.10 - 0.90 10*3/mm3    Eosinophils, Absolute 0.22 0.00 - 0.40 10*3/mm3    Basophils, Absolute 0.04 0.00 - 0.20 10*3/mm3    Immature Grans, Absolute 0.05 0.00 - 0.05 10*3/mm3    nRBC 0.0 0.0 - 0.2 /100 WBC   Urinalysis With Microscopic If Indicated (No Culture) - Urine, Clean Catch    Collection Time: 10/09/24 12:05 PM    Specimen: Urine, Clean Catch   Result Value Ref Range    Color, UA Yellow Yellow, Straw    Appearance, UA Clear Clear    pH, UA 6.0 5.0 - 8.0    Specific Gravity, UA >1.030 (H) 1.005 - 1.030    Glucose, UA Negative Negative    Ketones, UA Negative Negative    Bilirubin, UA Negative Negative    Blood, UA Negative Negative    Protein, UA Negative Negative    Leuk Esterase, UA Negative Negative    Nitrite, UA Negative Negative    Urobilinogen, UA 0.2 E.U./dL 0.2 - 1.0 E.U./dL     Assessment & Plan   Diagnoses and all orders for this visit:    1. Right lower lobe lung mass (Primary)    2. Chronic obstructive pulmonary disease, unspecified COPD type    Prolonged discussion with patient and family in the room including review of hospitalization emergency room labs and CT findings.  Imaging was actually reviewed with the family.  Discussion that this is \"likely a primary lung malignancy/cancer\" strongly recommending that he follow-up with the pulmonologist as scheduled tomorrow to discuss possibly having a biopsy to confirm the diagnosis and type if present.  " We just discussed that there is a slight chance that it could be some other alternatives such as infection or benign findings.  Patient himself adamantly states he does not want to have surgery.  I discussed with him if this is a cancer alternatives may be used other than surgery or chemotherapy that may be palliative but I asked that they follow-up with the pulmonologist and subsequently even the oncology/rad onc specialist for more details.  We did discuss his lab findings showing that his potassium and come back to normal and therefore the elevated potassium was likely due to hemolysis and no further testing is needed at this time.  We did also discussed that he does have evidence of a stage IIIb chronic kidney disease but at this time evaluating the lung mass is a more pressing issue and we may revisit seeing the kidney doctor in the future.  After his prolonged discussion and answering all questions family and patient appear to be understanding and we will see what tomorrow's evaluation shows.      I spent 57 minutes caring for Louis on this date of service. This time includes time spent by me in the following activities:preparing for the visit, reviewing tests, obtaining and/or reviewing a separately obtained history, performing a medically appropriate examination and/or evaluation , counseling and educating the patient/family/caregiver, documenting information in the medical record, and independently interpreting results and communicating that information with the patient/family/caregiver     Dictated utilizing Dragon Dictation

## 2024-10-15 ENCOUNTER — PATIENT OUTREACH (OUTPATIENT)
Dept: CASE MANAGEMENT | Facility: OTHER | Age: 89
End: 2024-10-15
Payer: MEDICARE

## 2024-10-15 DIAGNOSIS — I10 PRIMARY HYPERTENSION: ICD-10-CM

## 2024-10-15 DIAGNOSIS — J44.9 CHRONIC OBSTRUCTIVE PULMONARY DISEASE, UNSPECIFIED COPD TYPE: Primary | ICD-10-CM

## 2024-10-15 NOTE — OUTREACH NOTE
"AMBULATORY CASE MANAGEMENT NOTE    Names and Relationships of Patient/Support Persons: Contact: Louis Villalba \"Carlos\"; Relationship: Self -     Adult Patient Profile  Questions/Answers      Flowsheet Row Most Recent Value   Symptoms/Conditions Managed at Home respiratory   Barriers to Managing Health stress of chronic illness   Respiratory Symptoms/Conditions COPD   Respiratory Management Strategies nebulizer therapy, medication therapy, adequate rest, activity, coping strategies, breathing exercise, breathing techniques   Respiratory Self-Management Outcome unsure   Respiratory Comment Patient recently was found with Lung CA. Following up with Pulmonology.   Equipment Currently Used at Home nebulizer        Social Work Assessment  Questions/Answers      Flowsheet Row Most Recent Value   Equipment Currently Used at Home nebulizer          CCM Interim Update    Called and spoke to patientLouis for CCM services. Introduced self, role and reason for the call following ER visit. Offered CCM program, explained goals and benefits. Consent obtained and patient agreeable to future outreaches. Provided my direct phone number and I will also send him mail with my info for urgent needs. Patient with f/u visit with PCP on 10/10 after ER visit and recent finding of Lung CA, plans and recommendation were discussed with family. Patient with family made appropriate follow up with Pulmonary Care, reviewed notes. Patient decided to not go with biopsy and treatments, looking into palliative care. RN-ACM will assist as needed. Patient with macular degeneration, R eye is better. He lives independently, reviewed home safety. Patient states he manages his medications. Neighbor and family are all very supportive and provides assistance. He does not drive, family assists with appointment transportation. Patient with history of COPD, no c/o respiratory symptoms at this time. Uses home nebulizer PRN, he is on room air. During this " outreach, patient verbalizes no urgent needs. RN-ACM will continue to further assess needs, provide health education and assist with care coordination.     Education Documentation  No documentation found.        Jason NEIL  Ambulatory Case Management    10/15/2024, 14:42 EDT

## 2024-10-31 ENCOUNTER — PATIENT OUTREACH (OUTPATIENT)
Dept: CASE MANAGEMENT | Facility: OTHER | Age: 89
End: 2024-10-31
Payer: MEDICARE

## 2024-10-31 DIAGNOSIS — I10 PRIMARY HYPERTENSION: ICD-10-CM

## 2024-10-31 DIAGNOSIS — J44.9 CHRONIC OBSTRUCTIVE PULMONARY DISEASE, UNSPECIFIED COPD TYPE: Primary | ICD-10-CM

## 2024-10-31 NOTE — OUTREACH NOTE
[facility] End of Month Documentation    This Chronic Medical Management Care Plan for Louis Villalba, 89 y.o. male, has been a new plan of care implemented; established and a new plan of care implemented for the month of October.  A cumulative time of 22  minutes was spent on this patient record this month, including chart review; phone call with patient.    Regarding the patient's problems: has Gastric outlet obstruction; COPD (chronic obstructive pulmonary disease); HTN (hypertension); CKD (chronic kidney disease) stage 3, GFR 30-59 ml/min; Benign prostatic hyperplasia without lower urinary tract symptoms; Hyperkalemia; Hyperlipidemia; GERD (gastroesophageal reflux disease); Medicare annual wellness visit, subsequent; BPPV (benign paroxysmal positional vertigo); Dizziness; Shortness of breath; Elevated liver function tests; Other dysphagia; History of esophageal stricture; Macular degeneration; Anemia; Iron deficiency anemia; Right lower lobe lung mass; and Calculus of gallbladder without cholecystitis without obstruction on their problem list., the following items were addressed: medical records; medications and any changes can be found within the plan section of the note.  A detailed listing of time spent for chronic care management is tracked within each outreach encounter.  Current medications include:  has a current medication list which includes the following prescription(s): acyclovir, albuterol, albuterol sulfate hfa, atorvastatin, brimonidine-timolol, docusate sodium, erythromycin, ferrous sulfate er, levothyroxine, meclizine, moxifloxacin, multiple vitamins-minerals, pantoprazole, tamsulosin, and stiolto respimat. and the patient is reported to be patient is compliant with medication protocol,  Medications are reported to be effective.  Regarding these diagnoses, referrals were made to the following provider(s):  n/a.  All notes on chart for PCP to review.    The patient was monitored remotely for activity  "level; medications; pain; blood pressure.    The patient's physical needs include:  physical healthcare; help taking medications as prescribed; eye care; medication education.     The patient's mental support needs include:  continued support    The patient's cognitive support needs include:  medication; health care    The patient's psychosocial support needs include:  continued support    The patient's functional needs include: physical healthcare    The patient's environmental needs include:  resources for disability needs, n/a    Care Plan overall comments:  No data recorded    Refer to previous outreach notes for more information on the areas listed above.    Monthly Billing Diagnoses  (J44.9) Chronic obstructive pulmonary disease, unspecified COPD type    (I10) Primary hypertension    Medications   Medications have been reconciled    Care Plan progress this month:      Recently Modified Care Plans Updates made since 9/30/2024 12:00 AM       COPD (Adult)           Problem Priority Last Modified     Psychological Adjustment to Diagnosis (COPD) --  10/15/2024  2:24 PM by Jason Welsh RN              Goal Recent Progress Last Modified     Adjustment to Disease Achieved --  10/15/2024  2:24 PM by Jason Welsh RN     Evidence-based guidance:   Explore the patient and family's understanding of the disease; use open-ended questions to encourage patient and family to share what is important to them.   Assess and provide support around experience of loss and lifestyle adjustments, such as loss of function, roles, social ties, independence and hobbies.   Support adjustment to  €œnew normal\" with focus on maintaining daily life as closely as possible to life before chronic obstructive pulmonary disease (COPD) diagnosis.   Express empathy; listen actively by encouraging the patient and family to express feelings, concerns and fears; ask questions and encourage open communication regarding embarrassing or " disturbing topics.   Assess for factors that may impact coping or adjustment, such as a preexisting mental health condition, prognosis, lack of social support, debilitating disease or financial difficulty that include no or insufficient insurance coverage.   Assess and address fear or anxiety related to dyspnea or perceived stigma of a noninfectious cough.   Prepare for re-entry into work, school and social activities; establish links or collaborate with mental health resources in the community, such as peer support or advocacy groups.   Assess anxiety, feelings of panic when breathing becomes labored, as well as impact on family/caregiver; consider cognitive behavioral therapy, deep breathing, meditation, visualization, photo or light therapy.   Encourage advanced care planning discussion to clarify goals of care; palliative care or hospice may be considered for advanced COPD patients if it aligns with patient's goals of care.   Explore common risk factors for depression, such as personal or family history of depression, substance use and stressors that include missed work, losing ability to do what patient was used to doing, changes in appearance, physical    or sexual abuse.   Destigmatize depression by presenting it as a problem requiring treatment, rather than a personal weakness.   Use a validated screening tool to identify level of suicide risk. If at risk, develop safety plan and implement additional safety measures based on level of risk, such as behavioral health referral or immediate assessment.    Notes:            Task Due Date Last Modified     Support Psychosocial Response to Chronic Obstructive Pulmonary Disease --  10/15/2024  2:25 PM by Jason Welsh RN     Care Management Activities:      - emotional support provided      Notes:                Problem Priority Last Modified     Disease Progression (COPD) --  10/15/2024  2:24 PM by Jason Welsh RN              Goal Recent Progress Last  Modified     Disease Progression Minimized or Managed --  10/15/2024  2:24 PM by Jason Welsh RN     Evidence-based guidance:   Identify current smoking/tobacco use; provide smoking cessation intervention.   Assess symptom control by the frequency and type of symptoms, reliever use and activity limitation at every encounter.   Assess risk for exacerbation (flare up) by evaluating spirometry, pulse oximetry, reliever use, presentation of symptoms and activity limitation; anticipate treatment adjustment based on risks and resources.   Develop and/or review and reinforce use of COPD rescue (action) plan even when symptoms are controlled or infrequent.   Ask patient to bring inhaler to all visits; assess and reinforce correct technique; address barriers to proper inhaler use, such as older age, use of multiple devices and lack of understanding.    Identify symptom triggers, such as smoking, virus, weather change, emotional upset, exercise, obesity and environmental allergen; consider reduction of work-exposure versus elimination to avoid compromising employment.   Correlate presentation to comorbidity, such as diabetes, heart failure, obstructive sleep apnea, depression and anxiety, which may worsen symptoms.   Prepare for individualized pharmacologic therapy that may include LABA (long-acting beta-2 agonist), LAMA (long-acting muscarinic antagonist), LINDA (short-acting beta-2 agonist) oral or inhaled corticosteroid.   Promote participation in pulmonary rehabilitation for breathing exercises, skills training, improved exercise capacity, mood and quality of life; address barriers to participation.   Promote physical activity or exercise to improve or maintain exercise capacity, based on tolerance that may include walking, water exercise, cycling or limb muscle strength training.   Promote use of energy conservation and activity pacing techniques.   Promote use of breathing and coughing techniques, such as  inspiratory muscle training, pursed-lip breathing, diaphragmatic breathing, pranayama yoga breathing or hamilton cough.   Screen for malnutrition risk factors, such as unintentional weight loss and poor oral intake; refer to dietitian if identified.   Consider recommendation for oral drink supplement or multivitamin and mineral supplements if suspect inadequate oral intake or micronutrient deficiencies.    Screen for obstructive sleep apnea; prepare patient for polysomnography based on risk and presentation.   Prepare patient for use of long-term oxygen and noninvasive ventilation to relieve hypercapnia, hypoxemia, obstructive sleep apnea and reduce work of breathing.   Prepare patient with worsening disease for surgical interventions that may include bronchoscopy, lung volume reduction surgery, bullectomy or lung transplantation.    Notes:            Task Due Date Last Modified     Alleviate Barriers to COPD Management --  10/15/2024  2:25 PM by Jason Welsh RN     Care Management Activities:      - medication-adherence assessment completed      Notes:                Problem Priority Last Modified     Symptom Exacerbation (COPD) --  10/15/2024  2:24 PM by Jason Welsh RN              Goal Recent Progress Last Modified     Symptom Exacerbation Prevented or Minimized --  10/15/2024  2:24 PM by Jason Welsh RN     Evidence-based guidance:   Monitor for signs of respiratory infection, including changes in sputum color, volume and thickness, as well as fever.   Encourage infection prevention strategies that may include prophylactic antibiotic therapy for patients with history of frequent exacerbations or antibiotic administration during exacerbation based on presentation, risk and benefit.   Encourage receipt of influenza and pneumococcal vaccine.   Prepare patient for use of home long-term oxygen therapy in presence of sever resting hypoxemia.   Prepare patients for laboratory studies or diagnostic exams,  such as spirometry, pulse oximetry and arterial blood gas based on current symptoms, risk factors and presentation.   Assess barriers and manage adherence, including inhaler technique and persistent trigger exposure; encourage adherence, even when symptoms are controlled or infrequent.   Assess and monitor for signs/symptoms of psychosocial concerns, such as shortness of breath-anxiety cycle or depression that may impact stability of symptoms.   Identify economic resources, sociocultural beliefs, social factors and health literacy that may interfere with adherence.   Promote lifestyle changes when needed, including regular physical activity based on tolerance, weight loss, healthy eating and stress management.   Consider referral to nurse or community health worker or home-visiting program for intensive support and education (disease-management program).   Increase frequency of follow-up following exacerbation or hospitalization; consider transition of care interventions, such as hospital visit, home visit, telephone follow-up, review of discharge summary and resource referrals.    Notes:            Task Due Date Last Modified     Identify and Minimize Risk of COPD Exacerbation --  10/15/2024  2:26 PM by Jason Welsh RN     Care Management Activities:      - barriers to treatment reviewed and addressed      Notes:                     Wellness (Adult)           Problem Priority Last Modified     Fall Risk --  10/15/2024  2:25 PM by Jason Welsh RN              Goal Recent Progress Last Modified     Absence of Fall and Fall-Related Injury --  10/15/2024  2:25 PM by Jason Welsh RN     Evidence-based guidance:   Assess fall risk using a validated tool when available. Consider balance and gait impairment, muscle weakness, diminished vision or hearing, environmental hazards, presence of urinary or bowel urgency and/or incontinence.   Communicate fall injury risk to interprofessional healthcare team.    Develop a fall prevention plan with the patient and family.   Promote use of personal vision and auditory aids.   Promote reorientation, appropriate sensory stimulation, and routines to decrease risk of fall when changes in mental status are present.   Assess assistance level required for safe and effective self-care; consider referral for home care.   Encourage physical activity, such as performance of self-care at highest level of ability, strength and balance exercise program, and provision of appropriate assistive devices; refer to rehabilitation therapy.   Refer to community-based fall prevention program where available.   If fall occurs, determine the cause and revise fall injury prevention plan.   Regularly review medication contribution to fall risk; consider risk related to polypharmacy and age.   Refer to pharmacist for consultation when concerns about medications are revealed.   Balance adequate pain management with potential for oversedation.   Provide guidance related to environmental modifications.   Consider supplementation with Vitamin D.    Notes:            Task Due Date Last Modified     Identify and Manage Contributors to Fall Risk --  10/15/2024  2:26 PM by Jason Welsh RN     Care Management Activities:      - barriers to safety identified      Notes:                Problem Priority Last Modified     Medication Adherence --  10/15/2024  2:25 PM by Jason Welsh RN              Goal Recent Progress Last Modified     Consistently take medications as prescribed --  10/15/2024  2:25 PM by Jason Welsh RN              Task Due Date Last Modified     Discuss barriers to medication adherence with patient --  10/15/2024  2:25 PM by Jason Welsh, NATASHA        Educate patient on frequency and refill details of meds --  10/15/2024  2:25 PM by Jason Welsh, RN        Assist patients in setting up daily notes/alarms for meds. Consider pill box use --  10/15/2024  2:25 PM by Blaise  NATASHA Gomez                Problem Priority Last Modified     Activity and Exercise (Wellness) --  10/15/2024  2:25 PM by Jason Welsh RN              Goal Recent Progress Last Modified     Activity and Exercise Increased --  10/15/2024  2:25 PM by Jason Welsh RN     Evidence-based guidance:   Review current exercise levels.   Assess patient perspective on exercise or activity level, barriers to increasing activity, motivation and readiness for change.   Recommend or set healthy exercise goal based on individual tolerance.   Encourage small steps toward making change in amount of exercise or activity.   Urge reduction of sedentary activities or screen time.   Promote group activities within the community or with family or support person.   Consider referral to rehabiliation therapist for assessment and exercise/activity plan.    Notes:            Task Due Date Last Modified     Alleviate Barriers to Increasing Activity Level --  10/15/2024  2:26 PM by Jason Welsh RN     Care Management Activities:      - support and encouragement provided      Notes:                Problem Priority Last Modified     Barriers to Treatment --  10/15/2024  2:25 PM by Jason Welsh RN              Goal Recent Progress Last Modified     Establish Reliable Transportation --  10/15/2024  2:27 PM by Jason Welsh RN     10/15 - Family and neighbor provides Transportation              Task Due Date Last Modified     Discuss current transportation plan with patient --  10/15/2024  2:27 PM by Jason Welsh RN        Assign community resources for transportation assistance --  10/15/2024  2:27 PM by Jason Welsh RN        Provide community resources for financial assistance --  10/15/2024  2:27 PM by Jason Welsh RN                          Current Specialty Plan of Care Status signed by both patient and provider    Instructions   Patient was provided an electronic copy of care plan  CCM services were  explained and offered and patient has accepted these services.  Patient has given their written consent to receive CCM services and understands that this includes the authorization of electronic communication of medical information with the other treating providers.  Patient understands that they may stop CCM services at any time and these changes will be effective at the end of the calendar month and will effectively revocate the agreement of CCM services.  Patient understands that only one practitioner can furnish and be paid for CCM services during one calendar month.  Patient also understands that there may be co-payment or deductible fees in association with CCM services.  Patient will continue with at least monthly follow-up calls with the Ambulatory .    Jason NEIL  Ambulatory Case Management    10/31/2024, 09:43 EDT

## 2024-11-08 ENCOUNTER — PATIENT OUTREACH (OUTPATIENT)
Dept: CASE MANAGEMENT | Facility: OTHER | Age: 89
End: 2024-11-08
Payer: MEDICARE

## 2024-11-08 NOTE — OUTREACH NOTE
"AMBULATORY CASE MANAGEMENT NOTE    Names and Relationships of Patient/Support Persons: Contact: Louis Villalba \"Carlos\"; Relationship: Self -     CCM Interim Update    Called and spoke to patient's son for CCM update. Introduced self, role and reason for the call. Patient is sleeping and resting at the time of call. Explained to son the purpose of the outreach. Son reported that patient is currently under Hospice care now. Informed son that CCM will sign off. Reported no urgent or further needs at this time and family will reach out to PCP office should they need further assistance. Closing CCM for patient is under Hospice services now.     Education Documentation  No documentation found.        Jason NEIL  Ambulatory Case Management    11/8/2024, 13:20 EST  "

## 2024-11-26 ENCOUNTER — TELEPHONE (OUTPATIENT)
Dept: CASE MANAGEMENT | Facility: OTHER | Age: 89
End: 2024-11-26
Payer: MEDICARE

## (undated) DEVICE — ESOPHAGEAL BALLOON DILATATION CATHETER: Brand: CRE FIXED WIRE

## (undated) DEVICE — SENSR O2 OXIMAX FNGR A/ 18IN NONSTR

## (undated) DEVICE — TUBING, SUCTION, 1/4" X 10', STRAIGHT: Brand: MEDLINE

## (undated) DEVICE — KT ORCA ORCAPOD DISP STRL

## (undated) DEVICE — CANN O2 ETCO2 FITS ALL CONN CO2 SMPL A/ 7IN DISP LF

## (undated) DEVICE — ADAPT CLN BIOGUARD AIR/H2O DISP

## (undated) DEVICE — MSK ENDO PORT O2 POM ELITE CURAPLEX A/

## (undated) DEVICE — BITEBLOCK OMNI BLOC

## (undated) DEVICE — LN SMPL CO2 SHTRM SD STREAM W/M LUER

## (undated) DEVICE — FRCP BX RADJAW4 NDL 2.8 240CM LG OG BX40

## (undated) DEVICE — DEV INFL CRE STERIFLATE 60CC DISP

## (undated) DEVICE — ESOPHAGEAL/PYLORIC/COLONIC WIREGUIDED BALLOON DILATATION CATHETER: Brand: CRE WIREGUIDED